# Patient Record
Sex: FEMALE | Race: WHITE | Employment: UNEMPLOYED | URBAN - METROPOLITAN AREA
[De-identification: names, ages, dates, MRNs, and addresses within clinical notes are randomized per-mention and may not be internally consistent; named-entity substitution may affect disease eponyms.]

---

## 2017-11-21 ENCOUNTER — INITIAL CONSULT (OUTPATIENT)
Dept: SURGERY | Age: 27
End: 2017-11-21

## 2017-11-21 VITALS
DIASTOLIC BLOOD PRESSURE: 70 MMHG | WEIGHT: 160 LBS | BODY MASS INDEX: 30.21 KG/M2 | HEIGHT: 61 IN | SYSTOLIC BLOOD PRESSURE: 122 MMHG

## 2017-11-21 DIAGNOSIS — M62.08 DIASTASIS OF RECTUS ABDOMINIS: Primary | ICD-10-CM

## 2017-11-21 PROCEDURE — G8427 DOCREV CUR MEDS BY ELIG CLIN: HCPCS | Performed by: SURGERY

## 2017-11-21 PROCEDURE — 99242 OFF/OP CONSLTJ NEW/EST SF 20: CPT | Performed by: SURGERY

## 2017-11-21 PROCEDURE — G8484 FLU IMMUNIZE NO ADMIN: HCPCS | Performed by: SURGERY

## 2017-11-21 PROCEDURE — G8417 CALC BMI ABV UP PARAM F/U: HCPCS | Performed by: SURGERY

## 2017-11-21 RX ORDER — ESCITALOPRAM OXALATE 20 MG/1
20 TABLET ORAL DAILY
COMMUNITY
End: 2018-07-03 | Stop reason: ALTCHOICE

## 2018-03-15 ENCOUNTER — HOSPITAL ENCOUNTER (OUTPATIENT)
Dept: OTHER | Age: 28
Discharge: HOME | End: 2018-03-15
Attending: FAMILY MEDICINE | Admitting: FAMILY MEDICINE

## 2018-03-15 DIAGNOSIS — M54.50 CHRONIC BILATERAL LOW BACK PAIN WITHOUT SCIATICA: ICD-10-CM

## 2018-03-15 DIAGNOSIS — G89.29 CHRONIC BILATERAL LOW BACK PAIN WITHOUT SCIATICA: ICD-10-CM

## 2018-03-23 ENCOUNTER — HOSPITAL ENCOUNTER (OUTPATIENT)
Dept: PHYSICAL THERAPY | Age: 28
Discharge: HOME | End: 2018-03-31
Admitting: FAMILY MEDICINE

## 2018-03-29 ENCOUNTER — HOSPITAL ENCOUNTER (OUTPATIENT)
Dept: PHYSICAL THERAPY | Age: 28
Discharge: HOME | End: 2018-03-30
Admitting: FAMILY MEDICINE

## 2018-03-29 NOTE — FLOWSHEET NOTE
of L SI joint w/ Forward bend; Baseline R anterior pelvis/ L posterior pelvis                                                     NMR re-education                                             Therapeutic Exercise and NMR EXR  [x] (40848) Provided verbal/tactile cueing for activities related to strengthening, flexibility, endurance, ROM  for improvements in proximal hip and core control with self care, mobility, lifting and ambulation. [x] (89074) Provided verbal/tactile cueing for activities related to improving balance, coordination, kinesthetic sense, posture, motor skill, proprioception  to assist with core control in self care, mobility, lifting, and ambulation.      Therapeutic Activities:    [] (74141 or 73134) Provided verbal/tactile cueing for activities related to improving balance, coordination, kinesthetic sense, posture, motor skill, proprioception and motor activation to allow for proper function  with self care and ADLs  [] (52427) Provided training and instruction to the patient for proper core and proximal hip recruitment and positioning with ambulation re-education     Home Exercise Program:    [x] (82841) Reviewed/Progressed HEP activities related to strengthening, flexibility, endurance, ROM of core, proximal hip and LE for functional self-care, mobility, lifting and ambulation   [] (14429) Reviewed/Progressed HEP activities related to improving balance, coordination, kinesthetic sense, posture, motor skill, proprioception of core, proximal hip and LE for self care, mobility, lifting, and ambulation      Manual Treatments:  PROM / STM / Oscillations-Mobs:  G-I, II, III, IV (PA's, Inf., Post.)  [] (04366) Provided manual therapy to mobilize proximal hip and LS spine soft tissue/joints for the purpose of modulating pain, promoting relaxation,  increasing ROM, reducing/eliminating soft tissue swelling/inflammation/restriction, improving soft tissue extensibility and allowing for proper ROM for normal Other:      ASSESSMENT:  See eval    Treatment/Activity Tolerance:  [x] Patient tolerated treatment well [] Patient limited by fatique  [x] Patient limited by pain  [] Patient limited by other medical complications  [] Other:     Prognosis: [x] Good [] Fair  [] Poor    Patient Requires Follow-up: [x] Yes  [] No    PLAN: See eval  [] Continue per plan of care [] Alter current plan (see comments)  [x] Plan of care initiated [] Hold pending MD visit [] Discharge    Electronically signed by: Carmen Haley PT, DPT

## 2018-04-01 ENCOUNTER — HOSPITAL ENCOUNTER (OUTPATIENT)
Dept: PHYSICAL THERAPY | Age: 28
Discharge: HOME | End: 2018-04-30
Attending: FAMILY MEDICINE | Admitting: FAMILY MEDICINE

## 2018-04-06 ENCOUNTER — HOSPITAL ENCOUNTER (OUTPATIENT)
Dept: PHYSICAL THERAPY | Age: 28
Discharge: HOME | End: 2018-04-07
Admitting: FAMILY MEDICINE

## 2018-04-12 ENCOUNTER — HOSPITAL ENCOUNTER (OUTPATIENT)
Dept: PHYSICAL THERAPY | Age: 28
Discharge: HOME | End: 2018-04-13
Admitting: FAMILY MEDICINE

## 2018-04-24 ENCOUNTER — HOSPITAL ENCOUNTER (OUTPATIENT)
Dept: PHYSICAL THERAPY | Age: 28
Discharge: HOME | End: 2018-04-25
Admitting: FAMILY MEDICINE

## 2018-05-01 ENCOUNTER — HOSPITAL ENCOUNTER (OUTPATIENT)
Dept: PHYSICAL THERAPY | Age: 28
Discharge: HOME | End: 2018-05-31
Attending: FAMILY MEDICINE | Admitting: FAMILY MEDICINE

## 2018-05-01 ENCOUNTER — HOSPITAL ENCOUNTER (OUTPATIENT)
Dept: PHYSICAL THERAPY | Age: 28
Discharge: HOME | End: 2018-05-02
Admitting: FAMILY MEDICINE

## 2018-05-15 ENCOUNTER — HOSPITAL ENCOUNTER (OUTPATIENT)
Dept: PHYSICAL THERAPY | Age: 28
Discharge: HOME | End: 2018-05-16
Admitting: FAMILY MEDICINE

## 2018-06-01 ENCOUNTER — HOSPITAL ENCOUNTER (OUTPATIENT)
Dept: PHYSICAL THERAPY | Age: 28
Discharge: HOME | End: 2018-06-30
Attending: FAMILY MEDICINE | Admitting: FAMILY MEDICINE

## 2018-07-03 ENCOUNTER — OFFICE VISIT (OUTPATIENT)
Dept: ORTHOPEDIC SURGERY | Age: 28
End: 2018-07-03

## 2018-07-03 VITALS
BODY MASS INDEX: 29.07 KG/M2 | SYSTOLIC BLOOD PRESSURE: 122 MMHG | HEIGHT: 61 IN | WEIGHT: 154 LBS | DIASTOLIC BLOOD PRESSURE: 70 MMHG

## 2018-07-03 DIAGNOSIS — M54.16 LUMBAR RADICULOPATHY: ICD-10-CM

## 2018-07-03 DIAGNOSIS — M48.061 LUMBAR FORAMINAL STENOSIS: Primary | ICD-10-CM

## 2018-07-03 DIAGNOSIS — M51.36 DDD (DEGENERATIVE DISC DISEASE), LUMBAR: ICD-10-CM

## 2018-07-03 PROCEDURE — G8417 CALC BMI ABV UP PARAM F/U: HCPCS | Performed by: PHYSICAL MEDICINE & REHABILITATION

## 2018-07-03 PROCEDURE — G8427 DOCREV CUR MEDS BY ELIG CLIN: HCPCS | Performed by: PHYSICAL MEDICINE & REHABILITATION

## 2018-07-03 PROCEDURE — 99243 OFF/OP CNSLTJ NEW/EST LOW 30: CPT | Performed by: PHYSICAL MEDICINE & REHABILITATION

## 2018-07-03 NOTE — PROGRESS NOTES
New Patient: SPINE    Referring Provider:  ROBE Lal CNP    CHIEF COMPLAINT:    Chief Complaint   Patient presents with    Lower Back Pain     NP, LSP. Patient notes intermittent pain for 5 years. No specific injury. Stating having flare ups 3-4 times a year where she is unable to walk. Last flare up was 3 months ago. Has had recent PT from 3/23 - 5/15 with little help. Also seen Chiropractor but little help. No h/o of lumbar spine surgery.  Leg Pain     Radiating left leg/thigh pain. HISTORY OF PRESENT ILLNESS:      · The patient is being sent at the request of ROBE Lal CNP in consultation as a new spine patient for low back pain and right leg pain. The patient is a 33 y.o. female whom reports She presents with intermittent low back pain for the past 5 years. She has had flareups where pain is quite severe and she is unable to ambulate. She competed physical therapy between March 23 2 May 15, 2018. She reports mild benefit from these measures. She continues to have low back pain and left leg pain into the ankle. She finds it difficult to stand rather than 15 minutes or walk greater than 15 minutes.     Pain Assessment  Location of Pain: Back (LSP)  Location Modifiers: Posterior  Severity of Pain: 2  Quality of Pain: Aching  Duration of Pain: Persistent  Frequency of Pain: Constant  Aggravating Factors: Standing, Other (Comment) (Lifting; Prolonged sitting)  Limiting Behavior: Yes  Relieving Factors: Rest  Result of Injury: No  Work-Related Injury: No  Are there other pain locations you wish to document?: No      Associated signs and symptoms:   Neurogenic bowel or bladder symptoms:  no   Perceived weakness:  no   Difficulty walking:  yes    Recent Imaging (within past one year)   Xrays: yes   MRI or CT of spine: no    Current/Past Treatment:   · Physical Therapy:  yes from 3/23-5/15/18  · Chiropractic:  none  · Injection:  none  · Medications:   NSAIDS:  yes   Muscle relaxer: yes   Steriods:  yes   Neuropathic medications:  none   Opioids:  none  · Previous surgery:  no  · Previous surgical consult:  no                Past Medical History:   Past Medical History:   Diagnosis Date    Asthma     as a child    Bacterial vaginosis     Chlamydia     Mental disorder     depression as an adolescent    Sciatic pain       Past Surgical History:     Past Surgical History:   Procedure Laterality Date    WISDOM TOOTH EXTRACTION  October 2010     Current Medications:   No current outpatient prescriptions on file. Allergies:  Nickel  Social History:    reports that she has quit smoking. She smoked 0.50 packs per day. She has never used smokeless tobacco. She reports that she does not drink alcohol or use drugs. Family History:   Family History   Problem Relation Age of Onset    High Blood Pressure Mother     Diabetes Maternal Grandmother     High Blood Pressure Maternal Grandmother     Diabetes Maternal Grandfather     Diabetes Paternal Grandmother     Diabetes Paternal Grandfather     Heart Disease Paternal Grandfather          REVIEW OF SYSTEMS: Full ROS noted & scanned          PHYSICAL EXAM:    Vitals: Blood pressure 122/70, height 5' 1\" (1.549 m), weight 154 lb (69.9 kg), last menstrual period 07/01/2018, unknown if currently breastfeeding. GENERAL EXAM:  · General Apparence: Patient is adequately groomed with no evidence of malnutrition. · Psychiatric: Orientation: The patient is oriented to time, place and person. The patient's mood and affect are appropriate   · Vascular: Examination reveals no swelling and palpation reveals no tenderness in upper or lower extremities. Good capillary refill.    · The lymphatic examination of the neck, axillae and groin reveals all areas to be without enlargement or induration   Sensation is intact without deficit in the upper and lower extremities to light touch and pinprick  · Coordination of the upper and lower extremities are normal.    CERVICAL EXAMINATION:  · Inspection: Local inspection shows no step-off or bruising. Cervical alignment is normal. No instability is noted. · Palpation and Percussion: No evidence of tenderness at the midline. Paraspinal tenderness is not present. There is no paraspinal spasm. · Range of Motion:  limited by 25% in all planes due to pain   · Strength: 5/5 bilateral upper extremities  · Skin:There are no rashes, ulcerations or lesions. · Reflexes: Bilaterally triceps, biceps and brachioradialis are 1+. Clonus absent bilaterally at the feet. No pathological reflexes are noted. · Gait & station: normal, patient ambulates without assistance  · Additional Examinations:  · RIGHT UPPER EXTREMITY:  Inspection/examination of the right upper extremity does not show any tenderness, deformity or injury. Range of motion is normal and pain-free. There is no gross instability. There are no rashes, ulcerations or lesions. Strength and tone are normal. No atrophy or abnormal movements are noted. · LEFT UPPER EXTREMITY: Inspection/examination of the left upper extremity does not show any tenderness, deformity or injury. Range of motion is normal and pain-free. There is no gross instability. There are no rashes, ulcerations or lesions. Strength and tone are normal. No atrophy or abnormal movements are noted. LUMBAR/SACRAL EXAMINATION:  · Inspection: Local inspection shows no step-off or bruising. Lumbar alignment is normal. No instability is noted. · Palpation:   No evidence of tenderness at the midline. No tenderness bilaterally at the paraspinal or trochanters. There is no paraspinal spasm. · Range of Motion: limited by 50% in all planes due to pain  · Strength:   Strength testing is 5/5 in all muscle groups tested. · Special Tests:   Straight leg raise and crossed SLR negative. Wayne's testing is negative bilaterally. FADIR's testing is negative bilaterally.   · Skin: There are no rashes, ulcerations or Bacteria, UA 1+ (A) /HPF    Crystals 1+ Ca. Oxalate (A) /HPF       Impression (Medical Decision Making):       1. Lumbar foraminal stenosis    2. DDD (degenerative disc disease), lumbar    3. Lumbar radiculopathy        Plan (Medical Decision Making):    1. Medications:  Continue current regimen. 2. PT:  Encouraged to continue with HEP. 3. Further studies:  Setup Lumbar MR without contrast to evaluate for soft tissue pathology or stenosis contributing to the back pain and paresthesia. The patient has failed a six week trial of a HEP program within the last 6 months. 4. Interventional:  After further imaging is obtained, interventional options will be reviewed and recommended. 5. Healthy Lifestyle Measures:  Patient education material - Anatomic drawings, healthy lifestyle education, osteoporosis prevention, back and neck pain educational information, and advanced imaging preparedness were distributed to the patient. Posture education, proper lifting and carrying techniques, weight control, quitting smoking and minor ways to treat back pain were reviewed and distributed. For further information regarding the spine conditions and to review interventional treatments the patient was directed to [facility].  6.  Follow up:  1-2 weeks        Kristen Barry MD, BATOOL, [facility]  Board Certified in [address] [place] [address]  Certified and Fellowship Trained in [facility] ([facility])     This dictation was performed with a verbal recognition program [facility]) and it was checked for errors. It is possible that there are still dictated errors within this office note. If so, please bring any errors to my attention for an addendum. All efforts were made to ensure that this office note is accurate.

## 2018-07-11 ENCOUNTER — TELEPHONE (OUTPATIENT)
Dept: ORTHOPEDIC SURGERY | Age: 28
End: 2018-07-11

## 2018-07-11 NOTE — TELEPHONE ENCOUNTER
Spoke to patient to inform her that MRI LSP is approved and authorization is good through 09/01/2018. Order and authorization faxed to LEONARDO Paiz for scheduling purposes. Patient notes having MRI scheduled but now that MRI is approved is going to try to schedule something sooner. Will contact our office for follow up appointment once MRI is rescheduled for follow up with Dr. Mirella Mendieta.

## 2018-08-07 ENCOUNTER — TELEPHONE (OUTPATIENT)
Dept: ORTHOPEDIC SURGERY | Age: 28
End: 2018-08-07

## 2018-08-07 ENCOUNTER — OFFICE VISIT (OUTPATIENT)
Dept: ORTHOPEDIC SURGERY | Age: 28
End: 2018-08-07

## 2018-08-07 VITALS — BODY MASS INDEX: 28.32 KG/M2 | HEIGHT: 61 IN | WEIGHT: 150 LBS

## 2018-08-07 DIAGNOSIS — M51.26 LUMBAR DISC HERNIATION: Primary | ICD-10-CM

## 2018-08-07 DIAGNOSIS — M54.16 LUMBAR RADICULOPATHY: ICD-10-CM

## 2018-08-07 PROCEDURE — G8417 CALC BMI ABV UP PARAM F/U: HCPCS | Performed by: PHYSICAL MEDICINE & REHABILITATION

## 2018-08-07 PROCEDURE — G8427 DOCREV CUR MEDS BY ELIG CLIN: HCPCS | Performed by: PHYSICAL MEDICINE & REHABILITATION

## 2018-08-07 PROCEDURE — 99213 OFFICE O/P EST LOW 20 MIN: CPT | Performed by: PHYSICAL MEDICINE & REHABILITATION

## 2018-08-07 PROCEDURE — 1036F TOBACCO NON-USER: CPT | Performed by: PHYSICAL MEDICINE & REHABILITATION

## 2018-08-07 NOTE — TELEPHONE ENCOUNTER
Patient needs scheduled for an injection. Pre-Procedure sheet was given to the patient. x1 (LEFT L5 & S1 TF)      PER DR. CAT, [facility] PATIENT TO SCHEDULE.

## 2018-08-07 NOTE — LETTER
Please schedule the following with:     Date:   2018 @ 7:15     Account: [de-identified]  Patient: Seamus Moscow    : 1990  Address:  [address] [place] [id number]    Phone (H):  [phone] (home)      ----------------------------------------------------------------------------------------------  Diagnosis:     ICD-10-CM ICD-9-CM    1. Lumbar disc herniation M51.26 722.10    2. Lumbar radiculopathy M54.16 724.4          Levels: L5, S1  on the left  Transforaminal LISA  CPT Codes [id number], [id number]    ----------------------------------------------------------------------------------------------  Injection #   [id number]    Attending Physician       Pee Mejia.  Tamika Munoz MD.  ----------------------------------------------------------------------------------------------  Injection Scheduled For:    At:    [address]    Pre-Cert#    [employer] Insurance     Pre-Cert#    Comments:    · Infection control  · Tested positive for MRSA in past 12 months:  no  · Tested positive for MSSA \"staph infection\" in past 12 months: no  · Tested positive for VRE (Vancomycin Resistant Enterococci) in past 12 months:   no  · Currently on any antibiotics for an infection: no  · Anticoagulants:  · On a blood thinner:  no   · Any history of bleeding disorder: no   · Advanced Liver disease: no   · Advanced Renal disease: no   · Glaucoma: no   · Diabetes: no     Sedation:  No  -----------------------------------------------------------------------------------------------  Allergies   Allergen Reactions    Nickel

## 2018-08-07 NOTE — PROGRESS NOTES
Denies unsteady gait or progressive weakness  MUSCULOSKELETAL: Denies joint swelling or redness  GI: Denies nausea, vomiting, diarrhea   : Denies bowel or bladder issues       PHYSICAL EXAM:    Vitals: Height 5' 1\" (1.549 m), weight 150 lb (68 kg), unknown if currently breastfeeding. GENERAL EXAM:  · General Apparence: Patient is adequately groomed with no evidence of malnutrition. · Psychiatric: Orientation: The patient is oriented to time, place and person. The patient's mood and affect are appropriate   · Vascular: Examination reveals no swelling and palpation reveals no tenderness in upper or lower extremities. Good capillary refill. · Sensation is intact without deficit in the upper and lower extremities to light touch and pinprick  · Coordination of the upper and lower extremities are normal.  ·   LUMBAR/SACRAL EXAMINATION:  · Inspection: Local inspection shows no step-off or bruising. Lumbar alignment is normal. No instability is noted. · Palpation:   No evidence of tenderness at the midline. No tenderness bilaterally at the paraspinal or trochanters. There is no paraspinal spasm. · Range of Motion: limited by 50% in all planes due to pain  · Strength:   Strength testing is 5/5 in all muscle groups tested. · Special Tests:   Straight leg raise and crossed SLR negative. Wayne's testing is negative bilaterally. FADIR's testing is negative bilaterally. · Skin: There are no rashes, ulcerations or lesions. · Reflexes: Reflexes are symmetrically 2+ at the patellar and ankle tendons. Clonus absent bilaterally at the feet. · Gait & station: antalgic on the left  · Additional Examinations:  · RIGHT LOWER EXTREMITY: Inspection/examination of the right lower extremity does not show any tenderness, deformity or injury. Range of motion is normal and pain-free. There is no gross instability. There are no rashes, ulcerations or lesions.  Strength and tone are normal. No atrophy or abnormal movements are Risks, benefits and alternatives of interventional options were discussed. These include and are not limited to bleeding, infection, spinal headache, nerve injury and lack of pain relief. The patient verbalized understanding and would like to proceed. The patient will be scheduled accordingly. 5. Follow up:  4-6 weeks          Kristen Plata MD, BATOOL, [facility]  Board Certified in [address] [place] [address]  Certified and Fellowship Trained in [facility] ([facility])             This dictation was performed with a verbal recognition program [facility]) and it was checked for errors. It is possible that there are still dictated errors within this office note. If so, please bring any errors to my attention for an addendum. All efforts were made to ensure that this office note is accurate.

## 2018-08-17 ENCOUNTER — TELEPHONE (OUTPATIENT)
Dept: ORTHOPEDIC SURGERY | Age: 28
End: 2018-08-17

## 2018-08-28 ENCOUNTER — HOSPITAL ENCOUNTER (OUTPATIENT)
Age: 28
Setting detail: OUTPATIENT SURGERY
Discharge: HOME | End: 2018-08-28
Attending: PHYSICAL MEDICINE & REHABILITATION | Admitting: PHYSICAL MEDICINE & REHABILITATION
Payer: COMMERCIAL

## 2018-08-28 VITALS
HEART RATE: 51 BPM | DIASTOLIC BLOOD PRESSURE: 72 MMHG | SYSTOLIC BLOOD PRESSURE: 113 MMHG | TEMPERATURE: 97.4 F | RESPIRATION RATE: 16 BRPM | OXYGEN SATURATION: 100 %

## 2018-08-28 PROCEDURE — 3600000012 HC SURGERY LEVEL 2 ADDTL 15MIN: Performed by: PHYSICAL MEDICINE & REHABILITATION

## 2018-08-28 PROCEDURE — 2500000003 HC RX 250 WO HCPCS: Performed by: PHYSICAL MEDICINE & REHABILITATION

## 2018-08-28 PROCEDURE — 2709999900 HC NON-CHARGEABLE SUPPLY: Performed by: PHYSICAL MEDICINE & REHABILITATION

## 2018-08-28 PROCEDURE — 6360000002 HC RX W HCPCS: Performed by: PHYSICAL MEDICINE & REHABILITATION

## 2018-08-28 PROCEDURE — 6360000004 HC RX CONTRAST MEDICATION: Performed by: PHYSICAL MEDICINE & REHABILITATION

## 2018-08-28 PROCEDURE — 7100000010 HC PHASE II RECOVERY - FIRST 15 MIN: Performed by: PHYSICAL MEDICINE & REHABILITATION

## 2018-08-28 PROCEDURE — 3600000002 HC SURGERY LEVEL 2 BASE: Performed by: PHYSICAL MEDICINE & REHABILITATION

## 2018-08-28 RX ORDER — LIDOCAINE HYDROCHLORIDE 10 MG/ML
INJECTION, SOLUTION EPIDURAL; INFILTRATION; INTRACAUDAL; PERINEURAL PRN
Status: DISCONTINUED | OUTPATIENT
Start: 2018-08-28 | End: 2018-08-28 | Stop reason: HOSPADM

## 2018-08-28 ASSESSMENT — PAIN DESCRIPTION - DESCRIPTORS: DESCRIPTORS: ACHING;SHARP

## 2018-08-28 ASSESSMENT — PAIN SCALES - GENERAL: PAINLEVEL_OUTOF10: 0

## 2018-08-28 ASSESSMENT — PAIN - FUNCTIONAL ASSESSMENT: PAIN_FUNCTIONAL_ASSESSMENT: 0-10

## 2018-08-28 NOTE — OP NOTE
Patient:  Sylvia Holm  YOB: 1990  Medical Record #:  [id number]   Place: [address], [place]  Date:  8/28/2018   Physician:  Tristen Benítez MD    Procedure: 1. Transforaminal Lumbar Epidural Steroid Injection -  left L5           2. Transforaminal Lumbar Epidural Steroid Injection -  left S1     Pre-Procedure Diagnosis: Lumbar HNP, Lumbar radiculopathy      Post-Procedure Diagnosis: Same    Sedation: Local with 1% Lidocaine 2.5 ml and no IV sedation    EBL: None    Complications: None    Procedure Summary:    The patient was seen in the office for complaints of low back and left leg pain. Review of the imaging and physical exam of the patient confirmed the pre-procedure diagnosis. After a thorough discussion of risks, benefits and alternatives informed consent was obtained. The patient was brought to the procedure suite and placed in the prone position. The skin overlying the lumbar spine was prepped and draped in the usual sterile fashion. Using fluoroscopic guidance, the left L5 foramen was identified. Through anesthetized skin, a 22 gauge 3.5 inch curved tip spinal needle was advanced into the foramen. Isovue M 300 was instilled showing an epidurogram/nerve root outline pattern without evidence of vascular or intrathecal spread. Following which, 40 mg of depomedrol mixed with 1 ml of 0.5% Marcaine was instilled. The needle was removed. Using fluoroscopic guidance, the left S1 foramen was identified. Through anesthetized skin, a 22 gauge 3.5 inch curved tip spinal needle was advanced into the foramen. Isovue M 300 was instilled showing an epidurogram/nerve root outline pattern without evidence of vascular or intrathecal spread. Following which, 40 mg of depomedrol mixed with 1 ml of 0.5% Marcaine was instilled. The needle was removed and band-aids were applied. The patient was transferred to the post-operative area in stable condition.

## 2018-09-11 ENCOUNTER — OFFICE VISIT (OUTPATIENT)
Dept: ORTHOPEDIC SURGERY | Age: 28
End: 2018-09-11

## 2018-09-11 VITALS — BODY MASS INDEX: 28.32 KG/M2 | HEIGHT: 61 IN | WEIGHT: 150 LBS

## 2018-09-11 DIAGNOSIS — M47.816 SPONDYLOSIS OF LUMBAR REGION WITHOUT MYELOPATHY OR RADICULOPATHY: ICD-10-CM

## 2018-09-11 DIAGNOSIS — M51.26 HNP (HERNIATED NUCLEUS PULPOSUS), LUMBAR: Primary | ICD-10-CM

## 2018-09-11 PROCEDURE — G8427 DOCREV CUR MEDS BY ELIG CLIN: HCPCS | Performed by: PHYSICAL MEDICINE & REHABILITATION

## 2018-09-11 PROCEDURE — 99213 OFFICE O/P EST LOW 20 MIN: CPT | Performed by: PHYSICAL MEDICINE & REHABILITATION

## 2018-09-11 PROCEDURE — G8417 CALC BMI ABV UP PARAM F/U: HCPCS | Performed by: PHYSICAL MEDICINE & REHABILITATION

## 2018-09-11 PROCEDURE — 1036F TOBACCO NON-USER: CPT | Performed by: PHYSICAL MEDICINE & REHABILITATION

## 2018-09-11 NOTE — PROGRESS NOTES
Follow up: Ran WAGONER Debra  1990  [id number]      CHIEF COMPLAINT:    Chief Complaint   Patient presents with    Lower Back Pain     F/u Left L5 & Left S1 TF 8/28. Patient notes no improvement from injection. HISTORY OF PRESENT ILLNESS:  Ms. Cisco Lester is a 33 y.o. female returns for a follow up visit for multiple medical problems. Her current presenting problems are   1. HNP (herniated nucleus pulposus), lumbar    2. Spondylosis of lumbar region without myelopathy or radiculopathy    . As per information/history obtained from the PADT(patient assessment and documentation tool) - She complains of pain in the lower back with radiation to the lower back She rates the pain 4/10 and describes it as dull, aching. Pain is made worse by: random movements/activities, prolonged sitting or household chores. She denies side effects from the current pain regimen. Patient reports that since the last follow up visit the physical functioning is unchanged, family/social relationships are unchanged, mood is unchanged and sleep patterns are unchanged, and that the overall functioning is unchanged. Patient denies neurological bowel or bladder. She returns after undergoing a left L5 and S1 transforaminal epidural steroid injection. Up until this weekend her pain was a 0-1 out of 10. Over the weekend she did quite a bit of activity including cleaning her house all day long. She reports this is aggravated her back pain. She denies having any leg pain.         Associated signs and symptoms:   Neurogenic bowel or bladder symptoms:  no   Perceived weakness:  no   Difficulty walking:  no              Past Medical History:   Past Medical History:   Diagnosis Date    Asthma     as a child    Bacterial vaginosis     Chlamydia     Mental disorder     depression as an adolescent    Sciatic pain       Past Surgical History:     Past Surgical History:   Procedure Laterality Date    LA NJX DX/THER SBST INTRLMNR CRV/THRC W/IMG GDN Left 8/28/2018    LEFT LUMBAR FIVE SACRAL ONE TRANSFORAMINAL EPIDURAL STEROID INJECTION SITE CONFIRMED BY FLUOROSOCOPY performed by Arun Dunn MD at [address] EXTRACTION  October 2010     Current Medications:   No current outpatient prescriptions on file. Allergies:  Nickel  Social History:    reports that she has quit smoking. She smoked 0.50 packs per day. She has never used smokeless tobacco. She reports that she does not drink alcohol or use drugs. Family History:   Family History   Problem Relation Age of Onset    High Blood Pressure Mother     Diabetes Maternal Grandmother     High Blood Pressure Maternal Grandmother     Diabetes Maternal Grandfather     Diabetes Paternal Grandmother     Diabetes Paternal Grandfather     Heart Disease Paternal Grandfather        REVIEW OF SYSTEMS:   CONSTITUTIONAL: Denies unexplained weight loss, fevers, chills or fatigue  NEUROLOGICAL: Denies unsteady gait or progressive weakness  MUSCULOSKELETAL: Denies joint swelling or redness  GI: Denies nausea, vomiting, diarrhea   : Denies bowel or bladder issues       PHYSICAL EXAM:    Vitals: Height 5' 1\" (1.549 m), weight 150 lb (68 kg), last menstrual period 08/28/2018, unknown if currently breastfeeding. GENERAL EXAM:  · General Apparence: Patient is adequately groomed with no evidence of malnutrition. · Psychiatric: Orientation: The patient is oriented to time, place and person. The patient's mood and affect are appropriate   · Vascular: Examination reveals no swelling and palpation reveals no tenderness in upper or lower extremities. Good capillary refill. · Sensation is intact without deficit in the upper and lower extremities to light touch and pinprick  · Coordination of the upper and lower extremities are normal.      LUMBAR/SACRAL EXAMINATION:  · Inspection: Local inspection shows no step-off or bruising.   Lumbar alignment is normal. No instability is noted.  · Palpation:   No evidence of tenderness at the midline. No tenderness bilaterally at the paraspinal or trochanters. There is no paraspinal spasm. · Range of Motion: limited by 25% in all planes due to pain  · Strength:   Strength testing is 5/5 in all muscle groups tested. · Special Tests:   Straight leg raise and crossed SLR negative. · Skin: There are no rashes, ulcerations or lesions. · Reflexes: Reflexes are symmetrically 2+ at the patellar and ankle tendons. Clonus absent bilaterally at the feet. · Gait & station: normal, patient ambulates without assistance  · Additional Examinations:  · RIGHT LOWER EXTREMITY: Inspection/examination of the right lower extremity does not show any tenderness, deformity or injury. Range of motion is normal and pain-free. There is no gross instability. There are no rashes, ulcerations or lesions. Strength and tone are normal. No atrophy or abnormal movements are noted. · LEFT LOWER EXTREMITY:  Inspection/examination of the left lower extremity does not show any tenderness, deformity or injury. Range of motion is normal and pain-free. There is no gross instability. There are no rashes, ulcerations or lesions. Strength and tone are normal. No atrophy or abnormal movements are noted. Diagnostic Testing:    MR Lumbar spine shows 7/25/18  1. Left paracentral L5-S1 disc herniation indenting the anterior thecal sac. 2. Central L4-5 disc herniation indenting the anterior thecal sac.        Results for orders placed or performed during the hospital encounter of 08/21/16   Urine reflex to culture   Result Value Ref Range    Color, UA Yellow Straw/Yellow    Clarity, UA Clear Clear    Glucose, Ur Negative Negative mg/dL    Bilirubin Urine Negative Negative    Ketones, Urine TRACE (A) Negative mg/dL    Specific Gravity, UA >=1.030 1.005 - 1.030    Blood, Urine Negative Negative    pH, UA 5.5 5.0 - 8.0    Protein, UA TRACE (A) Negative mg/dL    Urobilinogen, Urine 0.2 <2.0 E.U./dL    Nitrite, Urine Negative Negative    Leukocyte Esterase, Urine Negative Negative    Microscopic Examination YES     Urine Reflex to Culture Not Indicated     Urine Type Not Specified    Microscopic Urinalysis   Result Value Ref Range    WBC, UA 0-2 0 - 5 /HPF    RBC, UA 0-2 0 - 2 /HPF    Epi Cells 3-5 /HPF    Bacteria, UA 1+ (A) /HPF    Crystals 1+ Ca. Oxalate (A) /HPF     Impression:       1. HNP (herniated nucleus pulposus), lumbar    2. Spondylosis of lumbar region without myelopathy or radiculopathy        Plan:  Clinical Course: No change    1. Medications:  Continue anti-inflammatories with appropriate GI Precautions including to stop if develop dark tarry stools or GI upset and to take with food. 2. PT:  She was given a lumbar HEP  3. Further studies:  No further studies. 4. Interventional:  No significant benefit from LESI  5. Follow up:  4-6 months          Kristen Lange MD, BATOOL, [facility]  Board Certified in [address] [place] [address] Certified and Fellowship Trained in [facility] ([facility])             This dictation was performed with a verbal recognition program [facility]) and it was checked for errors. It is possible that there are still dictated errors within this office note. If so, please bring any errors to my attention for an addendum. All efforts were made to ensure that this office note is accurate.

## 2019-05-07 ENCOUNTER — OFFICE VISIT (OUTPATIENT)
Dept: ORTHOPEDIC SURGERY | Age: 29
End: 2019-05-07
Payer: COMMERCIAL

## 2019-05-07 VITALS
BODY MASS INDEX: 28.3 KG/M2 | DIASTOLIC BLOOD PRESSURE: 62 MMHG | HEART RATE: 69 BPM | WEIGHT: 149.91 LBS | SYSTOLIC BLOOD PRESSURE: 108 MMHG | HEIGHT: 61 IN

## 2019-05-07 DIAGNOSIS — M54.16 LUMBAR RADICULOPATHY: ICD-10-CM

## 2019-05-07 DIAGNOSIS — M51.26 HNP (HERNIATED NUCLEUS PULPOSUS), LUMBAR: Primary | ICD-10-CM

## 2019-05-07 DIAGNOSIS — M47.816 SPONDYLOSIS WITHOUT MYELOPATHY OR RADICULOPATHY, LUMBAR REGION: ICD-10-CM

## 2019-05-07 PROCEDURE — G8417 CALC BMI ABV UP PARAM F/U: HCPCS | Performed by: PHYSICAL MEDICINE & REHABILITATION

## 2019-05-07 PROCEDURE — G8427 DOCREV CUR MEDS BY ELIG CLIN: HCPCS | Performed by: PHYSICAL MEDICINE & REHABILITATION

## 2019-05-07 PROCEDURE — 1036F TOBACCO NON-USER: CPT | Performed by: PHYSICAL MEDICINE & REHABILITATION

## 2019-05-07 PROCEDURE — 99214 OFFICE O/P EST MOD 30 MIN: CPT | Performed by: PHYSICAL MEDICINE & REHABILITATION

## 2019-05-07 RX ORDER — METHYLPREDNISOLONE 4 MG/1
TABLET ORAL
Qty: 1 KIT | Refills: 0 | Status: SHIPPED | OUTPATIENT
Start: 2019-05-07 | End: 2019-05-30 | Stop reason: ALTCHOICE

## 2019-05-07 NOTE — LETTER
Please schedule the following with:     Date:   2019 @ 9;15     Account: [de-identified]  Patient: Damian Vanegas    : 1990  Address:  [address] [place] [address] [facility]    Phone (H):  [phone] (home)      ----------------------------------------------------------------------------------------------  Diagnosis:     ICD-10-CM    1. HNP (herniated nucleus pulposus), lumbar M51.26    2. Lumbar radiculopathy M54.16    3. Spondylosis without myelopathy or radiculopathy, lumbar region M47.816          Levels:L4  Procedure type TRANSFORAMINAL  Side BILATERAL  CPT Codes 99280    ----------------------------------------------------------------------------------------------  Injection #   [email or web address]    Attending Physician       Mercy Quiroz MD.      ----------------------------------------------------------------------------------------------  Injection Scheduled For:    At:    [address]    Pre-Cert#    [employer]     Pre-Cert#    Comments or Special instructions:    · Infection control  · Tested positive for MRSA in past 12 months:  no  · Tested positive for MSSA \"staph infection\" in past 12 months: no  · Tested positive for VRE (Vancomycin Resistant Enterococci) in past 12 months:   no  · Currently on any antibiotics for an infection: no  · Anticoagulants:  · On a blood thinner:  no   · Any history of bleeding disorder: no   · Advanced Liver disease: no   · Advanced Renal disease: no   · Glaucoma: no   · Diabetes: no     Sedation:  No  -----------------------------------------------------------------------------------------------  Allergies   Allergen Reactions    Nickel

## 2019-05-07 NOTE — PROGRESS NOTES
Follow up: Wanda Richardson  1990  [id number]      CHIEF COMPLAINT:    Chief Complaint   Patient presents with    Lower Back Pain         HISTORY OF PRESENT ILLNESS:  Ms. Agustin Torres is a 26 y.o. female returns for a follow up visit for multiple medical problems. Her current presenting problems are   1. HNP (herniated nucleus pulposus), lumbar    2. Lumbar radiculopathy    3. Spondylosis without myelopathy or radiculopathy, lumbar region    . As per information/history obtained from the PADT(patient assessment and documentation tool) - She complains of pain in the lower back with radiation to the upper leg Bilateral She rates the pain 4/10 and describes it as aching, throbbing. Pain is made worse by: sitting. She denies side effects from the current pain regimen. Patient reports that since the last follow up visit the physical functioning is better, family/social relationships are unchanged, mood is unchanged and sleep patterns are worse, and that the overall functioning is unchanged. Patient denies neurological bowel or bladder. Patient presents in office today for lower back pain. Patient states that in today's visit her pain level is a 4/10. She states that last week she was up to a 9/10. She states that when her pain is at its worse she can feel it radiating into her legs bilaterally. She states that she has been to her chiropractor, stretches, massages, and taken Ibuprofen without relief. Patient believes that the pain is getting worse. Patient is unsure if the intense pain is related to how she sleeps. Patient had a left L5-S1 LISA on 8/28/19. She did not have lasting relief from this injection.        Associated signs and symptoms:   Neurogenic bowel or bladder symptoms:  no   Perceived weakness:  no   Difficulty walking:  no              Past Medical History:   Past Medical History:   Diagnosis Date    Asthma     as a child    Bacterial vaginosis     Chlamydia     Mental disorder all areas to be without enlargement or induration  · Sensation is intact without deficit in the upper and lower extremities to light touch and pinprick  · Coordination of the upper and lower extremities are normal.  · RIGHT UPPER EXTREMITY:  Inspection/examination of the right upper extremity does not show any tenderness, deformity or injury. Range of motion is unremarkable and pain-free. There is no gross instability. There are no rashes, ulcerations or lesions. Strength and tone are normal. No atrophy or abnormal movements are noted. · LEFT UPPER EXTREMITY: Inspection/examination of the left upper extremity does not show any tenderness, deformity or injury. Range of motion is unremarkable and pain-free. There is no gross instability. There are no rashes, ulcerations or lesions. Strength and tone are normal. No atrophy or abnormal movements are noted. LUMBAR/SACRAL EXAMINATION:  · Inspection: Local inspection shows no step-off or bruising. Lumbar alignment is normal. No instability is noted. · Palpation:   No evidence of tenderness at the midline. Lumbar paraspinal tenderness: Mild L4/5 and L5/S1 tenderness  Bursal tenderness No tenderness bilaterally  There is no paraspinal spasm. · Range of Motion: limited by 50% in all planes due to pain with side bending and extension. · Strength:   Strength testing is 5/5 in all muscle groups tested. · Special Tests:   Straight leg raise positive bilaterally and crossed SLR negative. Wayne's testing is negative bilaterally. FADIR's testing is negative bilaterally. · Skin: There are no rashes, ulcerations or lesions. · Reflexes: Reflexes are symmetrically 2+ at the patellar and ankle tendons. Clonus absent bilaterally at the feet. · Gait & station: normal, patient ambulates without assistance  · Additional Examinations:  · RIGHT LOWER EXTREMITY: Inspection/examination of the right lower extremity does not show any tenderness, deformity or injury.  Range of motion is normal and pain-free. There is no gross instability. There are no rashes, ulcerations or lesions. Strength and tone are normal. No atrophy or abnormal movements are noted. · LEFT LOWER EXTREMITY:  Inspection/examination of the left lower extremity does not show any tenderness, deformity or injury. Range of motion is normal and pain-free. There is no gross instability. There are no rashes, ulcerations or lesions. Strength and tone are normal. No atrophy or abnormal movements are noted. Diagnostic Testing:    No new diagnostics NONE  Results for orders placed or performed during the hospital encounter of 08/21/16   Urine reflex to culture   Result Value Ref Range    Color, UA Yellow Straw/Yellow    Clarity, UA Clear Clear    Glucose, Ur Negative Negative mg/dL    Bilirubin Urine Negative Negative    Ketones, Urine TRACE (A) Negative mg/dL    Specific Gravity, UA >=1.030 1.005 - 1.030    Blood, Urine Negative Negative    pH, UA 5.5 5.0 - 8.0    Protein, UA TRACE (A) Negative mg/dL    Urobilinogen, Urine 0.2 <2.0 E.U./dL    Nitrite, Urine Negative Negative    Leukocyte Esterase, Urine Negative Negative    Microscopic Examination YES     Urine Reflex to Culture Not Indicated     Urine Type Not Specified    Microscopic Urinalysis   Result Value Ref Range    WBC, UA 0-2 0 - 5 /HPF    RBC, UA 0-2 0 - 2 /HPF    Epi Cells 3-5 /HPF    Bacteria, UA 1+ (A) /HPF    Crystals 1+ Ca. Oxalate (A) /HPF     Impression:       1. HNP (herniated nucleus pulposus), lumbar    2. Lumbar radiculopathy    3. Spondylosis without myelopathy or radiculopathy, lumbar region        Plan:  Clinical Course: Above diagnoses are worsening    I discussed the diagnosis and the treatment options with Maria Victoria Henning today. In Summary:  The various treatment options were outlined and discussed with Maria Victoria Henning including:  Conservative care options: physical therapy, ice, medications, bracing, and activity modification.  The indications for therapeutic injections. The indications for additional imaging/laboratory studies. The indications for (possible future) interventions. After considering the various options discussed, Hal Gutierrez elected to pursue a course of treatment that includes the followin. Medications:  I will prescribe a medrol dose pack to help with the inflammatory component of pain. Risks, benefits and alternatives were discussed. Recommended to stop any NSAIDs to reduce risk of GI bleed during the course of the dose pack. 2. PT:  Encouraged to continue with HEP. 3. Further studies:  No further studies. 4. Interventional:  We discussed pursuing a Bilateral L4 TF epidural steroid injection to address the pain. Radiologic imaging and symptoms confirm the pain etiology. Risks, benefits and alternatives of interventional options were discussed. These include and are not limited to bleeding, infection, spinal headache, nerve injury and lack of pain relief. The patient verbalized understanding and would like to proceed. The patient will be scheduled accordingly. 5. Follow up:  4-6 weeks      Hal Gutierrez was instructed to call the office if her symptoms worsen or if new symptoms appear prior to the next scheduled visit. She is specifically instructed to contact the office between now & her scheduled appointment if she has concerns related to her condition or if she needs assistance in scheduling the above tests. She is welcome to call for an appointment sooner if she has any additional concerns or questions. Tony Colmenares CRMA, am scribing for and in the presence of Dr. Asher Echavarria. Wilberto. 19 5:15 PM Keshav Molina CRMA. I, Dr. Asher Echavarria. Wilberto, personally performed the services described in this documentation as scribed by Keshav Molina CRMA in my presence and it is both accurate and complete. Riya Ramsey.  Shruthi Boone MD, BATOOL, [facility]  Board Certified in Physical Medicine & Rehabilitation  Board Certified and Fellowship Trained in [facility] ([facility])             This dictation was performed with a verbal recognition program [facility]) and it was checked for errors. It is possible that there are still dictated errors within this office note. If so, please bring any errors to my attention for an addendum. All efforts were made to ensure that this office note is accurate.

## 2019-05-13 ENCOUNTER — TELEPHONE (OUTPATIENT)
Dept: ORTHOPEDIC SURGERY | Age: 29
End: 2019-05-13

## 2019-05-13 NOTE — TELEPHONE ENCOUNTER
Patient has been in inceased pain with numbness and tingling and pain down the bilateral legs. Spoke with Dr. Anastacia Marks and she would like the patient to come to the office tomorrow. I told the patient that she could have 1:30 pm.  There seemed to be a slot open at that time. If you could please put her on the schedule at that time, that would be great. She could not do 3:45 pm and she could not do the morning slots that I have available.

## 2019-05-13 NOTE — TELEPHONE ENCOUNTER
Please see below for patient pain level and concerns. She was last seen 5/7/2019. Given medrol dose pack and scheduled for inj. She has [facility]. I do not have approval yet as it can take up to 15 business day. She is stating her pain is 10 times worse than before her appt and rx. Tried to get patient to schedule an office appt to evaluate. She refuses and wants to be called. Thank yoy.

## 2019-05-13 NOTE — TELEPHONE ENCOUNTER
PATIENT CALLED, STATES PAIN AFTER STEROID ID 10X WORSE, CAN STAND UP STRAIGHT, AND WOULD LIKE A CALL BACK, AND SHE CAN NOT TO DAILY TASKS IN THIS CURRENT STATES, PATIENT CAN BE REACHED -[phone]

## 2019-05-14 ENCOUNTER — OFFICE VISIT (OUTPATIENT)
Dept: ORTHOPEDIC SURGERY | Age: 29
End: 2019-05-14
Payer: COMMERCIAL

## 2019-05-14 VITALS
HEART RATE: 67 BPM | WEIGHT: 149.91 LBS | DIASTOLIC BLOOD PRESSURE: 62 MMHG | BODY MASS INDEX: 28.3 KG/M2 | HEIGHT: 61 IN | SYSTOLIC BLOOD PRESSURE: 104 MMHG | RESPIRATION RATE: 14 BRPM

## 2019-05-14 DIAGNOSIS — M70.61 GREATER TROCHANTERIC BURSITIS OF BOTH HIPS: ICD-10-CM

## 2019-05-14 DIAGNOSIS — M70.62 GREATER TROCHANTERIC BURSITIS OF BOTH HIPS: ICD-10-CM

## 2019-05-14 DIAGNOSIS — M47.816 SPONDYLOSIS WITHOUT MYELOPATHY OR RADICULOPATHY, LUMBAR REGION: ICD-10-CM

## 2019-05-14 DIAGNOSIS — M54.16 LUMBAR RADICULOPATHY: ICD-10-CM

## 2019-05-14 DIAGNOSIS — M51.26 HNP (HERNIATED NUCLEUS PULPOSUS), LUMBAR: ICD-10-CM

## 2019-05-14 PROCEDURE — G8417 CALC BMI ABV UP PARAM F/U: HCPCS | Performed by: PHYSICAL MEDICINE & REHABILITATION

## 2019-05-14 PROCEDURE — 99214 OFFICE O/P EST MOD 30 MIN: CPT | Performed by: PHYSICAL MEDICINE & REHABILITATION

## 2019-05-14 PROCEDURE — 1036F TOBACCO NON-USER: CPT | Performed by: PHYSICAL MEDICINE & REHABILITATION

## 2019-05-14 PROCEDURE — G8427 DOCREV CUR MEDS BY ELIG CLIN: HCPCS | Performed by: PHYSICAL MEDICINE & REHABILITATION

## 2019-05-14 PROCEDURE — 20611 DRAIN/INJ JOINT/BURSA W/US: CPT | Performed by: PHYSICAL MEDICINE & REHABILITATION

## 2019-05-14 RX ORDER — METHYLPREDNISOLONE 4 MG/1
TABLET ORAL
Qty: 1 KIT | Refills: 0 | Status: CANCELLED | OUTPATIENT
Start: 2019-05-14

## 2019-05-14 NOTE — PROGRESS NOTES
pain       Past Surgical History:     Past Surgical History:   Procedure Laterality Date    ME NJX DX/THER SBST INTRLMNR CRV/THRC W/IMG GDN Left 8/28/2018    LEFT LUMBAR FIVE SACRAL ONE TRANSFORAMINAL EPIDURAL STEROID INJECTION SITE CONFIRMED BY FLUOROSOCOPY performed by Marni Nobles MD at [address] EXTRACTION  October 2010     Current Medications:     Current Outpatient Medications:     methylPREDNISolone (MEDROL, ROSE MARY,) 4 MG tablet, Take by mouth., Disp: 1 kit, Rfl: 0  Allergies:  Nickel  Social History:    reports that she has quit smoking. She smoked 0.50 packs per day. She has never used smokeless tobacco. She reports that she does not drink alcohol or use drugs. Family History:   Family History   Problem Relation Age of Onset    High Blood Pressure Mother     Diabetes Maternal Grandmother     High Blood Pressure Maternal Grandmother     Diabetes Maternal Grandfather     Diabetes Paternal Grandmother     Diabetes Paternal Grandfather     Heart Disease Paternal Grandfather        REVIEW OF SYSTEMS:   CONSTITUTIONAL: Denies unexplained weight loss, fevers, chills or fatigue  NEUROLOGICAL: Denies unsteady gait or progressive weakness  MUSCULOSKELETAL: Denies joint swelling or redness  GI: Denies nausea, vomiting, diarrhea   : Denies bowel or bladder issues       PHYSICAL EXAM:    Vitals: Blood pressure 104/62, pulse 67, resp. rate 14, height 5' 0.98\" (1.549 m), weight 149 lb 14.6 oz (68 kg), unknown if currently breastfeeding. GENERAL EXAM:  · General Apparence: Patient is adequately groomed with no evidence of malnutrition. · Psychiatric: Orientation: The patient is oriented to time, place and person. The patient's mood and affect are appropriate   · Vascular: Examination reveals no swelling and palpation reveals no tenderness in upper or lower extremities. Good capillary refill.    · The lymphatic examination of the neck, axillae and groin reveals all areas to be without enlargement or induration  · Sensation is intact without deficit in the upper and lower extremities to light touch and pinprick  · Coordination of the upper and lower extremities are normal.  · RIGHT UPPER EXTREMITY:  Inspection/examination of the right upper extremity does not show any tenderness, deformity or injury. Range of motion is unremarkable and pain-free. There is no gross instability. There are no rashes, ulcerations or lesions. Strength and tone are normal. No atrophy or abnormal movements are noted. · LEFT UPPER EXTREMITY: Inspection/examination of the left upper extremity does not show any tenderness, deformity or injury. Range of motion is unremarkable and pain-free. There is no gross instability. There are no rashes, ulcerations or lesions. Strength and tone are normal. No atrophy or abnormal movements are noted. LUMBAR/SACRAL EXAMINATION:  · Inspection: Local inspection shows no step-off or bruising. Lumbar alignment is normal. No instability is noted. · Palpation:   No evidence of tenderness at the midline. Lumbar paraspinal tenderness: Very limited  Bursal tenderness Bilateral greater trochanteric tenderness  There is no paraspinal spasm. · Range of Motion: very limited due to pain  · Strength:   Strength testing is 5/5 in all muscle groups tested. · Special Tests:   Straight leg raise and crossed SLR negative. Wayne's testing is negative bilaterally. FADIR's testing is negative bilaterally. · Skin: There are no rashes, ulcerations or lesions. · Reflexes: Reflexes are symmetrically 2+ at the patellar and ankle tendons. Clonus absent bilaterally at the feet. · Gait & station: normal, patient ambulates without assistance  · Additional Examinations:  · RIGHT LOWER EXTREMITY: Inspection/examination of the right lower extremity does not show any tenderness, deformity or injury. Range of motion is normal and pain-free. There is no gross instability.   There are no rashes, ulcerations or lesions. Strength and tone are normal. No atrophy or abnormal movements are noted. · LEFT LOWER EXTREMITY:  Inspection/examination of the left lower extremity does not show any tenderness, deformity or injury. Range of motion is normal and pain-free. There is no gross instability. There are no rashes, ulcerations or lesions. Strength and tone are normal. No atrophy or abnormal movements are noted. Diagnostic Testing:    NONE  Results for orders placed or performed during the hospital encounter of 08/21/16   Urine reflex to culture   Result Value Ref Range    Color, UA Yellow Straw/Yellow    Clarity, UA Clear Clear    Glucose, Ur Negative Negative mg/dL    Bilirubin Urine Negative Negative    Ketones, Urine TRACE (A) Negative mg/dL    Specific Gravity, UA >=1.030 1.005 - 1.030    Blood, Urine Negative Negative    pH, UA 5.5 5.0 - 8.0    Protein, UA TRACE (A) Negative mg/dL    Urobilinogen, Urine 0.2 <2.0 E.U./dL    Nitrite, Urine Negative Negative    Leukocyte Esterase, Urine Negative Negative    Microscopic Examination YES     Urine Reflex to Culture Not Indicated     Urine Type Not Specified    Microscopic Urinalysis   Result Value Ref Range    WBC, UA 0-2 0 - 5 /HPF    RBC, UA 0-2 0 - 2 /HPF    Epi Cells 3-5 /HPF    Bacteria, UA 1+ (A) /HPF    Crystals 1+ Ca. Oxalate (A) /HPF     Impression:       1. HNP (herniated nucleus pulposus), lumbar    2. Lumbar radiculopathy    3. Spondylosis without myelopathy or radiculopathy, lumbar region    4. Greater trochanteric bursitis of both hips        Plan:  Clinical Course: Above diagnoses are worsening    I discussed the diagnosis and the treatment options with Steve Omalley today. In Summary:  The various treatment options were outlined and discussed with Steve Omalley including:  Conservative care options: physical therapy, ice, medications, bracing, and activity modification. The indications for therapeutic injections.  The indications for additional

## 2019-05-14 NOTE — PROGRESS NOTES
5/14/19 2:25 PM         ND: 2862-2710-59   Yossi Mendoza    Lot Number: [id number]       Comments: BILATERAL GREATER TROCHANTERIC BURSA        NDC: 6736-1093-19   -   LIDOCAINE 1%    Lot Number: [id number]       Comments:  BILATERAL GREATER TROCHANTERIC BURSA

## 2019-05-15 ENCOUNTER — TELEPHONE (OUTPATIENT)
Dept: ORTHOPEDIC SURGERY | Age: 29
End: 2019-05-15

## 2019-05-15 RX ORDER — PREDNISONE 10 MG/1
TABLET ORAL
Qty: 26 TABLET | Refills: 0 | Status: SHIPPED | OUTPATIENT
Start: 2019-05-15 | End: 2019-05-30 | Stop reason: ALTCHOICE

## 2019-05-15 RX ORDER — GABAPENTIN 300 MG/1
300 CAPSULE ORAL NIGHTLY
Qty: 30 CAPSULE | Refills: 0 | Status: SHIPPED | OUTPATIENT
Start: 2019-05-15 | End: 2019-09-10 | Stop reason: ALTCHOICE

## 2019-05-15 NOTE — TELEPHONE ENCOUNTER
Dr. Eugenio Wilson took care of this and signed the note and the medications. They should be at the pharmacy in the next hour or so. Thank you!

## 2019-05-15 NOTE — TELEPHONE ENCOUNTER
Patient called this date stating she saw Dr. Kenrick Hussein yesterday and was supposed to have 2 prescriptions called into [facility] in [facility]. They have not been sent yet and patient would like to have them today. Please call her back at [phone].

## 2019-06-03 ENCOUNTER — TELEPHONE (OUTPATIENT)
Dept: ORTHOPEDIC SURGERY | Age: 29
End: 2019-06-03

## 2019-06-03 NOTE — TELEPHONE ENCOUNTER
Auth: [id number]  Date: 6/4/19 thru 9/4/19  CPT: 88443  DX: M51.26, M54.16, M47.816   Outpatient  Procedure: LISA  SX Location: [address]: [address] [place] [address]  Physician: KAL        CPT: [facility], [address] [facility] 26, [id number]     NPR

## 2019-06-04 ENCOUNTER — HOSPITAL ENCOUNTER (OUTPATIENT)
Age: 29
Setting detail: OUTPATIENT SURGERY
Discharge: HOME | End: 2019-06-04
Attending: PHYSICAL MEDICINE & REHABILITATION | Admitting: PHYSICAL MEDICINE & REHABILITATION
Payer: COMMERCIAL

## 2019-06-04 VITALS
RESPIRATION RATE: 17 BRPM | OXYGEN SATURATION: 99 % | WEIGHT: 134 LBS | HEART RATE: 50 BPM | BODY MASS INDEX: 25.3 KG/M2 | SYSTOLIC BLOOD PRESSURE: 110 MMHG | DIASTOLIC BLOOD PRESSURE: 67 MMHG | TEMPERATURE: 97.3 F | HEIGHT: 61 IN

## 2019-06-04 PROCEDURE — 6360000002 HC RX W HCPCS: Performed by: PHYSICAL MEDICINE & REHABILITATION

## 2019-06-04 PROCEDURE — 3600000002 HC SURGERY LEVEL 2 BASE: Performed by: PHYSICAL MEDICINE & REHABILITATION

## 2019-06-04 PROCEDURE — 2500000003 HC RX 250 WO HCPCS: Performed by: PHYSICAL MEDICINE & REHABILITATION

## 2019-06-04 PROCEDURE — 2709999900 HC NON-CHARGEABLE SUPPLY: Performed by: PHYSICAL MEDICINE & REHABILITATION

## 2019-06-04 PROCEDURE — 7100000011 HC PHASE II RECOVERY - ADDTL 15 MIN: Performed by: PHYSICAL MEDICINE & REHABILITATION

## 2019-06-04 PROCEDURE — 2580000003 HC RX 258: Performed by: PHYSICAL MEDICINE & REHABILITATION

## 2019-06-04 PROCEDURE — 99152 MOD SED SAME PHYS/QHP 5/>YRS: CPT | Performed by: PHYSICAL MEDICINE & REHABILITATION

## 2019-06-04 PROCEDURE — 7100000010 HC PHASE II RECOVERY - FIRST 15 MIN: Performed by: PHYSICAL MEDICINE & REHABILITATION

## 2019-06-04 RX ORDER — BUPIVACAINE HYDROCHLORIDE 5 MG/ML
INJECTION, SOLUTION EPIDURAL; INTRACAUDAL PRN
Status: DISCONTINUED | OUTPATIENT
Start: 2019-06-04 | End: 2019-06-04 | Stop reason: ALTCHOICE

## 2019-06-04 RX ORDER — LIDOCAINE HYDROCHLORIDE 10 MG/ML
INJECTION, SOLUTION EPIDURAL; INFILTRATION; INTRACAUDAL; PERINEURAL PRN
Status: DISCONTINUED | OUTPATIENT
Start: 2019-06-04 | End: 2019-06-04 | Stop reason: ALTCHOICE

## 2019-06-04 RX ORDER — METHYLPREDNISOLONE ACETATE 80 MG/ML
INJECTION, SUSPENSION INTRA-ARTICULAR; INTRALESIONAL; INTRAMUSCULAR; SOFT TISSUE PRN
Status: DISCONTINUED | OUTPATIENT
Start: 2019-06-04 | End: 2019-06-04 | Stop reason: ALTCHOICE

## 2019-06-04 RX ORDER — MIDAZOLAM HYDROCHLORIDE 5 MG/ML
INJECTION INTRAMUSCULAR; INTRAVENOUS PRN
Status: DISCONTINUED | OUTPATIENT
Start: 2019-06-04 | End: 2019-06-04 | Stop reason: ALTCHOICE

## 2019-06-04 RX ORDER — SODIUM CHLORIDE, SODIUM LACTATE, POTASSIUM CHLORIDE, CALCIUM CHLORIDE 600; 310; 30; 20 MG/100ML; MG/100ML; MG/100ML; MG/100ML
INJECTION, SOLUTION INTRAVENOUS CONTINUOUS
Status: DISCONTINUED | OUTPATIENT
Start: 2019-06-04 | End: 2019-06-04 | Stop reason: HOSPADM

## 2019-06-04 RX ADMIN — SODIUM CHLORIDE, POTASSIUM CHLORIDE, SODIUM LACTATE AND CALCIUM CHLORIDE: 600; 310; 30; 20 INJECTION, SOLUTION INTRAVENOUS at 09:32

## 2019-06-04 RX ADMIN — LIDOCAINE HYDROCHLORIDE 0.2 ML: 10 INJECTION, SOLUTION EPIDURAL; INFILTRATION; INTRACAUDAL; PERINEURAL at 09:31

## 2019-06-04 ASSESSMENT — PAIN DESCRIPTION - DESCRIPTORS: DESCRIPTORS: DISCOMFORT

## 2019-06-04 ASSESSMENT — PAIN - FUNCTIONAL ASSESSMENT
PAIN_FUNCTIONAL_ASSESSMENT: 0-10
PAIN_FUNCTIONAL_ASSESSMENT: ACTIVITIES ARE NOT PREVENTED

## 2019-06-04 ASSESSMENT — PAIN SCALES - GENERAL: PAINLEVEL_OUTOF10: 0

## 2019-06-04 NOTE — H&P
HISTORY AND PHYSICAL/PRE-SEDATION ASSESSMENT    Patient:  Anusha Cortez   :  1990  Medical Record No.:  [id number]   Date:  2019  Physician:  Sandra Álvarez M.D. Facility: [facility]    HISTORY OF PRESENT ILLNESS:                 The patient is a 26 y.o. female whom presents with lower back and bilateral leg pain. Review of the imaging and physical exam of the patient confirmed the pre-procedure diagnosis. After a thorough discussion of risks, benefits and alternatives informed consent was obtained. Past Medical History:   Past Medical History:   Diagnosis Date    Asthma     as a child    Bacterial vaginosis     Chlamydia     Mental disorder     depression as an adolescent    Sciatic pain       Past Surgical History:     Past Surgical History:   Procedure Laterality Date    AK NJX DX/THER SBST INTRLMNR CRV/THRC W/IMG GDN Left 2018    LEFT LUMBAR FIVE SACRAL ONE TRANSFORAMINAL EPIDURAL STEROID INJECTION SITE CONFIRMED BY FLUOROSOCOPY performed by Sandra Álvarez MD at [address] EXTRACTION  2010     Current Medications:   Prior to Admission medications    Medication Sig Start Date End Date Taking? Authorizing Provider   gabapentin (NEURONTIN) 300 MG capsule Take 1 capsule by mouth nightly for 30 days. 5/15/19 6/14/19 Yes Sandra Álvarez MD     Allergies:  Nickel  Social History:    reports that she has quit smoking. She smoked 0.50 packs per day. She has never used smokeless tobacco. She reports that she does not drink alcohol or use drugs.   Family History:   Family History   Problem Relation Age of Onset    High Blood Pressure Mother     Diabetes Maternal Grandmother     High Blood Pressure Maternal Grandmother     Diabetes Maternal Grandfather     Diabetes Paternal Grandmother     Diabetes Paternal Grandfather     Heart Disease Paternal Grandfather        Vitals: Blood pressure 106/70, pulse 51, temperature 97.3 °F (36.3 °C), temperature source Temporal, resp. rate 17, height 5' 1\" (1.549 m), weight 134 lb (60.8 kg), SpO2 100 %, unknown if currently breastfeeding. PHYSICAL EXAM:  HENT: Airway patent and reviewed  Cardiovascular: Normal rate, regular rhythm, normal heart sounds. Pulmonary/Chest: No wheezes. No rhonchi. No rales. Abdominal: Soft. Bowel sounds are normal. No distension. Extremities: Moves all extremities equally  Lumbar Spine: Painful range of motion, no midline tenderness       Diagnosis:Lumbar radiculopathy    Plan: Proceed with planned procedure      ASA CLASS:         []   I. Normal, healthy adult           [x]   II.  Mild systemic disease            []   III. Severe systemic disease      Mallampati: Mallampati Class II - (soft palate, fauces & uvula are visible)      Sedation plan:   [x]  Local              []  Minimal                  []  General anesthesia    Patient's condition acceptable for planned procedure/sedation. Post Procedure Plan   Return to same level of care   ______________________     The risks and benefits as well as alternatives to the procedure have been discussed with the patient and or family. The patient and or next of kin understands and agrees to proceed.     Carl Jacob M.D.

## 2019-06-18 ENCOUNTER — OFFICE VISIT (OUTPATIENT)
Dept: ORTHOPEDIC SURGERY | Age: 29
End: 2019-06-18
Payer: COMMERCIAL

## 2019-06-18 VITALS — BODY MASS INDEX: 25.31 KG/M2 | WEIGHT: 134.04 LBS | HEIGHT: 61 IN

## 2019-06-18 DIAGNOSIS — M70.62 GREATER TROCHANTERIC BURSITIS OF BOTH HIPS: ICD-10-CM

## 2019-06-18 DIAGNOSIS — M54.16 LUMBAR RADICULOPATHY: ICD-10-CM

## 2019-06-18 DIAGNOSIS — M70.61 GREATER TROCHANTERIC BURSITIS OF BOTH HIPS: ICD-10-CM

## 2019-06-18 DIAGNOSIS — M51.26 HNP (HERNIATED NUCLEUS PULPOSUS), LUMBAR: Primary | ICD-10-CM

## 2019-06-18 DIAGNOSIS — M47.816 SPONDYLOSIS WITHOUT MYELOPATHY OR RADICULOPATHY, LUMBAR REGION: ICD-10-CM

## 2019-06-18 PROCEDURE — G8417 CALC BMI ABV UP PARAM F/U: HCPCS | Performed by: PHYSICAL MEDICINE & REHABILITATION

## 2019-06-18 PROCEDURE — 1036F TOBACCO NON-USER: CPT | Performed by: PHYSICAL MEDICINE & REHABILITATION

## 2019-06-18 PROCEDURE — 99213 OFFICE O/P EST LOW 20 MIN: CPT | Performed by: PHYSICAL MEDICINE & REHABILITATION

## 2019-06-18 PROCEDURE — G8427 DOCREV CUR MEDS BY ELIG CLIN: HCPCS | Performed by: PHYSICAL MEDICINE & REHABILITATION

## 2019-06-18 NOTE — PROGRESS NOTES
6/4/2019    BILATERAL LUMBAR FOUR TRANSFORAMINAL EPIDURAL STEROID INJECTION SITE CONFIRMED BY FLUOROSCOPY performed by Shiela Persaud MD at [facility] DX/THER SBST INTRLMNR CRV/THRC W/IMG GDN Left 8/28/2018    LEFT LUMBAR FIVE SACRAL ONE TRANSFORAMINAL EPIDURAL STEROID INJECTION SITE CONFIRMED BY FLUOROSOCOPY performed by Shiela Persaud MD at [address] EXTRACTION  October 2010     Current Medications:     Current Outpatient Medications:     gabapentin (NEURONTIN) 300 MG capsule, Take 1 capsule by mouth nightly for 30 days. , Disp: 30 capsule, Rfl: 0  Allergies:  Nickel  Social History:    reports that she has quit smoking. She smoked 0.50 packs per day. She has never used smokeless tobacco. She reports that she does not drink alcohol or use drugs. Family History:   Family History   Problem Relation Age of Onset    High Blood Pressure Mother     Diabetes Maternal Grandmother     High Blood Pressure Maternal Grandmother     Diabetes Maternal Grandfather     Diabetes Paternal Grandmother     Diabetes Paternal Grandfather     Heart Disease Paternal Grandfather        REVIEW OF SYSTEMS:   CONSTITUTIONAL: Denies unexplained weight loss, fevers, chills or fatigue  NEUROLOGICAL: Denies unsteady gait or progressive weakness  MUSCULOSKELETAL: Denies joint swelling or redness  GI: Denies nausea, vomiting, diarrhea   : Denies bowel or bladder issues       PHYSICAL EXAM:    Vitals: Height 5' 0.98\" (1.549 m), weight 134 lb 0.6 oz (60.8 kg), unknown if currently breastfeeding. GENERAL EXAM:  · General Apparence: Patient is adequately groomed with no evidence of malnutrition. · Psychiatric: Orientation: The patient is oriented to time, place and person. The patient's mood and affect are appropriate   · Vascular: Examination reveals no swelling and palpation reveals no tenderness in upper or lower extremities. Good capillary refill.    · The lymphatic examination of the neck, axillae and groin reveals all areas to be without enlargement or induration  · Sensation is intact without deficit in the upper and lower extremities to light touch and pinprick  · Coordination of the upper and lower extremities are normal.  · Additional Examinations:  · RIGHT UPPER EXTREMITY:  Inspection/examination of the right upper extremity does not show any tenderness, deformity or injury. Range of motion is unremarkable and pain-free. There is no gross instability. There are no rashes, ulcerations or lesions. Strength and tone are normal. No atrophy or abnormal movements are noted. · LEFT UPPER EXTREMITY: Inspection/examination of the left upper extremity does not show any tenderness, deformity or injury. Range of motion is unremarkable and pain-free. There is no gross instability. There are no rashes, ulcerations or lesions. Strength and tone are normal. No atrophy or abnormal movements are noted. LUMBAR/SACRAL EXAMINATION:  · Inspection: Local inspection shows no step-off or bruising. Lumbar alignment is normal. No instability is noted. · Palpation:   No evidence of tenderness at the midline. Lumbar paraspinal tenderness: Mild L4/5 and L5/S1 tenderness  Bursal tenderness No tenderness bilaterally  There is no paraspinal spasm. · Range of Motion: pain-free ROM  · Strength:   Strength testing is 5/5 in all muscle groups tested. · Special Tests:   Straight leg raise and crossed SLR negative. Wayne's testing is negative bilaterally. FADIR's testing is negative bilaterally. · Skin: There are no rashes, ulcerations or lesions. · Reflexes: Reflexes are symmetrically 2+ at the patellar and ankle tendons. Clonus absent bilaterally at the feet. · Gait & station: normal, patient ambulates without assistance and no ataxia  · Additional Examinations:  · RIGHT LOWER EXTREMITY: Inspection/examination of the right lower extremity does not show any tenderness, deformity or injury.  Range of motion is normal and pain-free. There is no gross instability. There are no rashes, ulcerations or lesions. Strength and tone are normal. No atrophy or abnormal movements are noted. · LEFT LOWER EXTREMITY:  Inspection/examination of the left lower extremity does not show any tenderness, deformity or injury. Range of motion is normal and pain-free. There is no gross instability. There are no rashes, ulcerations or lesions. Strength and tone are normal. No atrophy or abnormal movements are noted. Diagnostic Testing:    No new diagnostics  Results for orders placed or performed during the hospital encounter of 08/21/16   Urine reflex to culture   Result Value Ref Range    Color, UA Yellow Straw/Yellow    Clarity, UA Clear Clear    Glucose, Ur Negative Negative mg/dL    Bilirubin Urine Negative Negative    Ketones, Urine TRACE (A) Negative mg/dL    Specific Gravity, UA >=1.030 1.005 - 1.030    Blood, Urine Negative Negative    pH, UA 5.5 5.0 - 8.0    Protein, UA TRACE (A) Negative mg/dL    Urobilinogen, Urine 0.2 <2.0 E.U./dL    Nitrite, Urine Negative Negative    Leukocyte Esterase, Urine Negative Negative    Microscopic Examination YES     Urine Reflex to Culture Not Indicated     Urine Type Not Specified    Microscopic Urinalysis   Result Value Ref Range    WBC, UA 0-2 0 - 5 /HPF    RBC, UA 0-2 0 - 2 /HPF    Epi Cells 3-5 /HPF    Bacteria, UA 1+ (A) /HPF    Crystals 1+ Ca. Oxalate (A) /HPF     Impression:       1. HNP (herniated nucleus pulposus), lumbar    2. Lumbar radiculopathy    3. Spondylosis without myelopathy or radiculopathy, lumbar region    4. Greater trochanteric bursitis of both hips        Plan:  Clinical Course: Above diagnoses are improving     I discussed the diagnosis and the treatment options with Shaddavidjeet Perazakirk today.      In Summary:  The various treatment options were outlined and discussed with [facility]injeet [facility] including:  Conservative care options: physical therapy, ice, medications, bracing, and

## 2019-08-27 ENCOUNTER — OFFICE VISIT (OUTPATIENT)
Dept: ORTHOPEDIC SURGERY | Age: 29
End: 2019-08-27
Payer: COMMERCIAL

## 2019-08-27 VITALS
SYSTOLIC BLOOD PRESSURE: 114 MMHG | HEIGHT: 61 IN | BODY MASS INDEX: 24.55 KG/M2 | DIASTOLIC BLOOD PRESSURE: 70 MMHG | RESPIRATION RATE: 16 BRPM | HEART RATE: 56 BPM | WEIGHT: 130 LBS

## 2019-08-27 DIAGNOSIS — M51.26 HNP (HERNIATED NUCLEUS PULPOSUS), LUMBAR: Primary | ICD-10-CM

## 2019-08-27 DIAGNOSIS — R20.2 PARESTHESIA OF BOTH LOWER EXTREMITIES: ICD-10-CM

## 2019-08-27 PROCEDURE — 99214 OFFICE O/P EST MOD 30 MIN: CPT | Performed by: PHYSICAL MEDICINE & REHABILITATION

## 2019-08-27 PROCEDURE — G8420 CALC BMI NORM PARAMETERS: HCPCS | Performed by: PHYSICAL MEDICINE & REHABILITATION

## 2019-08-27 PROCEDURE — 1036F TOBACCO NON-USER: CPT | Performed by: PHYSICAL MEDICINE & REHABILITATION

## 2019-08-27 PROCEDURE — G8427 DOCREV CUR MEDS BY ELIG CLIN: HCPCS | Performed by: PHYSICAL MEDICINE & REHABILITATION

## 2019-08-27 RX ORDER — METHYLPREDNISOLONE 4 MG/1
TABLET ORAL
Qty: 1 KIT | Refills: 0 | Status: SHIPPED | OUTPATIENT
Start: 2019-08-27 | End: 2019-09-10 | Stop reason: ALTCHOICE

## 2019-09-10 ENCOUNTER — OFFICE VISIT (OUTPATIENT)
Dept: ORTHOPEDIC SURGERY | Age: 29
End: 2019-09-10
Payer: COMMERCIAL

## 2019-09-10 VITALS
HEART RATE: 73 BPM | RESPIRATION RATE: 16 BRPM | HEIGHT: 61 IN | DIASTOLIC BLOOD PRESSURE: 70 MMHG | BODY MASS INDEX: 24.55 KG/M2 | WEIGHT: 130 LBS | SYSTOLIC BLOOD PRESSURE: 109 MMHG

## 2019-09-10 DIAGNOSIS — M51.26 HNP (HERNIATED NUCLEUS PULPOSUS), LUMBAR: Primary | ICD-10-CM

## 2019-09-10 DIAGNOSIS — M54.16 LUMBAR RADICULOPATHY: ICD-10-CM

## 2019-09-10 DIAGNOSIS — M47.816 SPONDYLOSIS WITHOUT MYELOPATHY OR RADICULOPATHY, LUMBAR REGION: ICD-10-CM

## 2019-09-10 PROCEDURE — 99213 OFFICE O/P EST LOW 20 MIN: CPT | Performed by: PHYSICIAN ASSISTANT

## 2019-09-10 PROCEDURE — 1036F TOBACCO NON-USER: CPT | Performed by: PHYSICIAN ASSISTANT

## 2019-09-10 PROCEDURE — G8427 DOCREV CUR MEDS BY ELIG CLIN: HCPCS | Performed by: PHYSICIAN ASSISTANT

## 2019-09-10 PROCEDURE — G8420 CALC BMI NORM PARAMETERS: HCPCS | Performed by: PHYSICIAN ASSISTANT

## 2019-09-10 NOTE — PROGRESS NOTES
Follow up: Ebony Richardson  1990  [id number]         Chief Complaint   Patient presents with    Lower Back Pain    Leg Pain     Right          HISTORY OF PRESENT ILLNESS:  Ms. Saad Patel is a 26 y.o. female returns for a follow up visit for multiple medical problems. Her current presenting problems are   1. HNP (herniated nucleus pulposus), lumbar    2. Lumbar radiculopathy    3. Spondylosis without myelopathy or radiculopathy, lumbar region    . As per information/history obtained from the PADT(patient assessment and documentation tool) - She complains of pain in the lower back with radiation to the upper leg Right and lower leg Right She rates the pain 4/10 and describes it as sharp, dull, aching, throbbing. Pain is made worse by: movement. She denies side effects from the current pain regimen. Patient reports that since the last follow-up visit the physical functioning is worse, family/social relationships are worse, mood is worse and sleep patterns are worse and that the overall functioning is worse. Patient denies neurological bowel or bladder. Patient in office for lower back pain with radiation into her right leg which extends down to the foot. She describes that she did have left leg pain, but this is not as bad as it was before the previous ESIs. She states that her pain level today is a 4/10. She states that Sunday while hosting her daughter's birthday party she was unable to walk. She states that at that time her pain level was a 6/10. Today her pain is a sharp, dull, aching, throbbing pain and is aggravated by certain movements. She has new and worsening symptoms since a previous MRI was undertaken back in July of 2018. She describes numbness and the occasional tingling down the right leg. She denies any new injuries or triggers. The patient was recently treated with a Medrol Dosepak which did not help with her symptoms.   This was prescribed and she was last seen by Dr. Philip Neal Interventional:  After further imaging is obtained, interventional options will be reviewed and recommended. 5. Follow up:  1-2 weeks      Glynn Robles was instructed to call the office if her symptoms worsen or if new symptoms appear prior to the next scheduled visit. She is specifically instructed to contact the office between now & her scheduled appointment if she has concerns related to her condition or if she needs assistance in scheduling the above tests. She is welcome to call for an appointment sooner if she has any additional concerns or questions. Shashank Cali CRMA am scribing for and in the presence of Arleen Cuadra 09/10/19 8:56 AM .    The physical examination was performed between the patient and FLORENCE Cuadra. All counseling during the appointment was performed between the patient and provider. Luma Stephenson PA-C, personally performed the services described in this documentation as scribed by Karen Zhou CRMA in my presence and it is both accurate and complete. BLOSSOM Mejia PA-C  Board Certified by the [employer] on Certification of Physician Assistants  [address]  Partner of [facility] ([facility])      This dictation was performed with a verbal recognition program [facility]) and it was checked for errors. It is possible that there are still dictated errors within this office note. If so, please bring any errors to my attention for an addendum. All efforts were made to ensure that this office note is accurate.

## 2019-09-23 ENCOUNTER — TELEPHONE (OUTPATIENT)
Dept: ORTHOPEDIC SURGERY | Age: 29
End: 2019-09-23

## 2019-09-24 ENCOUNTER — OFFICE VISIT (OUTPATIENT)
Dept: ORTHOPEDIC SURGERY | Age: 29
End: 2019-09-24
Payer: COMMERCIAL

## 2019-09-24 VITALS
RESPIRATION RATE: 16 BRPM | DIASTOLIC BLOOD PRESSURE: 73 MMHG | BODY MASS INDEX: 24.56 KG/M2 | HEART RATE: 71 BPM | HEIGHT: 61 IN | SYSTOLIC BLOOD PRESSURE: 107 MMHG | WEIGHT: 130.07 LBS

## 2019-09-24 DIAGNOSIS — M47.816 SPONDYLOSIS WITHOUT MYELOPATHY OR RADICULOPATHY, LUMBAR REGION: ICD-10-CM

## 2019-09-24 DIAGNOSIS — M51.26 HNP (HERNIATED NUCLEUS PULPOSUS), LUMBAR: Primary | ICD-10-CM

## 2019-09-24 DIAGNOSIS — M54.16 LUMBAR RADICULOPATHY: ICD-10-CM

## 2019-09-24 PROCEDURE — 99213 OFFICE O/P EST LOW 20 MIN: CPT | Performed by: PHYSICIAN ASSISTANT

## 2019-09-24 PROCEDURE — 1036F TOBACCO NON-USER: CPT | Performed by: PHYSICIAN ASSISTANT

## 2019-09-24 PROCEDURE — G8427 DOCREV CUR MEDS BY ELIG CLIN: HCPCS | Performed by: PHYSICIAN ASSISTANT

## 2019-09-24 PROCEDURE — G8420 CALC BMI NORM PARAMETERS: HCPCS | Performed by: PHYSICIAN ASSISTANT

## 2019-09-24 NOTE — PROGRESS NOTES
tenderness, deformity or injury. Range of motion is normal and pain-free. There is no gross instability. There are no rashes, ulcerations or lesions. Strength and tone are normal. No atrophy or abnormal movements are noted. · LEFT LOWER EXTREMITY:  Inspection/examination of the left lower extremity does not show any tenderness, deformity or injury. Range of motion is normal and pain-free. There is no gross instability. There are no rashes, ulcerations or lesions. Strength and tone are normal. No atrophy or abnormal movements are noted. Diagnostic Testing:    MR Lumbar spine shows  07/25/2018  CONCLUSION:   1. Left paracentral L5-S1 disc herniation indenting the anterior thecal sac. 2. Central L4-5 disc herniation indenting the anterior thecal sac. Results for orders placed or performed during the hospital encounter of 08/21/16   Urine reflex to culture   Result Value Ref Range    Color, UA Yellow Straw/Yellow    Clarity, UA Clear Clear    Glucose, Ur Negative Negative mg/dL    Bilirubin Urine Negative Negative    Ketones, Urine TRACE (A) Negative mg/dL    Specific Gravity, UA >=1.030 1.005 - 1.030    Blood, Urine Negative Negative    pH, UA 5.5 5.0 - 8.0    Protein, UA TRACE (A) Negative mg/dL    Urobilinogen, Urine 0.2 <2.0 E.U./dL    Nitrite, Urine Negative Negative    Leukocyte Esterase, Urine Negative Negative    Microscopic Examination YES     Urine Reflex to Culture Not Indicated     Urine Type Not Specified    Microscopic Urinalysis   Result Value Ref Range    WBC, UA 0-2 0 - 5 /HPF    RBC, UA 0-2 0 - 2 /HPF    Epi Cells 3-5 /HPF    Bacteria, UA 1+ (A) /HPF    Crystals 1+ Ca. Oxalate (A) /HPF     Impression:       1. HNP (herniated nucleus pulposus), lumbar    2. Lumbar radiculopathy    3. Spondylosis without myelopathy or radiculopathy, lumbar region        Plan:  Clinical Course: shows no change    I discussed the diagnosis and the treatment options with Keshav Mendez today.      In

## 2019-10-03 ENCOUNTER — HOSPITAL ENCOUNTER (OUTPATIENT)
Dept: PHYSICAL THERAPY | Age: 29
Setting detail: THERAPIES SERIES
Discharge: HOME | End: 2019-10-03
Payer: COMMERCIAL

## 2019-10-03 PROCEDURE — 97110 THERAPEUTIC EXERCISES: CPT

## 2019-10-03 PROCEDURE — 97112 NEUROMUSCULAR REEDUCATION: CPT

## 2019-10-03 PROCEDURE — 97161 PT EVAL LOW COMPLEX 20 MIN: CPT

## 2019-10-07 ENCOUNTER — HOSPITAL ENCOUNTER (OUTPATIENT)
Dept: PHYSICAL THERAPY | Age: 29
Setting detail: THERAPIES SERIES
Discharge: HOME | End: 2019-10-07
Payer: COMMERCIAL

## 2019-10-07 PROCEDURE — 97110 THERAPEUTIC EXERCISES: CPT

## 2019-10-07 PROCEDURE — 97112 NEUROMUSCULAR REEDUCATION: CPT

## 2019-10-11 ENCOUNTER — HOSPITAL ENCOUNTER (OUTPATIENT)
Dept: PHYSICAL THERAPY | Age: 29
Setting detail: THERAPIES SERIES
Discharge: HOME | End: 2019-10-11
Payer: COMMERCIAL

## 2019-10-11 PROCEDURE — 97110 THERAPEUTIC EXERCISES: CPT

## 2019-10-11 PROCEDURE — 97112 NEUROMUSCULAR REEDUCATION: CPT

## 2019-10-11 PROCEDURE — 97140 MANUAL THERAPY 1/> REGIONS: CPT

## 2019-10-14 ENCOUNTER — HOSPITAL ENCOUNTER (OUTPATIENT)
Dept: PHYSICAL THERAPY | Age: 29
Setting detail: THERAPIES SERIES
Discharge: HOME | End: 2019-10-14
Payer: COMMERCIAL

## 2019-10-14 ENCOUNTER — TELEPHONE (OUTPATIENT)
Dept: ORTHOPEDIC SURGERY | Age: 29
End: 2019-10-14

## 2019-10-14 PROCEDURE — 97140 MANUAL THERAPY 1/> REGIONS: CPT

## 2019-10-14 PROCEDURE — 97110 THERAPEUTIC EXERCISES: CPT

## 2019-10-14 PROCEDURE — 97112 NEUROMUSCULAR REEDUCATION: CPT

## 2019-10-15 ENCOUNTER — OFFICE VISIT (OUTPATIENT)
Dept: ORTHOPEDIC SURGERY | Age: 29
End: 2019-10-15
Payer: COMMERCIAL

## 2019-10-15 VITALS
BODY MASS INDEX: 24.56 KG/M2 | DIASTOLIC BLOOD PRESSURE: 81 MMHG | WEIGHT: 130.07 LBS | HEART RATE: 70 BPM | HEIGHT: 61 IN | SYSTOLIC BLOOD PRESSURE: 130 MMHG

## 2019-10-15 DIAGNOSIS — M48.062 LUMBAR STENOSIS WITH NEUROGENIC CLAUDICATION: ICD-10-CM

## 2019-10-15 DIAGNOSIS — M54.16 LUMBAR RADICULOPATHY: ICD-10-CM

## 2019-10-15 DIAGNOSIS — M53.3 SACROILIAC JOINT DYSFUNCTION OF LEFT SIDE: ICD-10-CM

## 2019-10-15 DIAGNOSIS — M51.26 HNP (HERNIATED NUCLEUS PULPOSUS), LUMBAR: Primary | ICD-10-CM

## 2019-10-15 PROCEDURE — G8420 CALC BMI NORM PARAMETERS: HCPCS | Performed by: PHYSICAL MEDICINE & REHABILITATION

## 2019-10-15 PROCEDURE — 20552 NJX 1/MLT TRIGGER POINT 1/2: CPT | Performed by: PHYSICAL MEDICINE & REHABILITATION

## 2019-10-15 PROCEDURE — 99214 OFFICE O/P EST MOD 30 MIN: CPT | Performed by: PHYSICAL MEDICINE & REHABILITATION

## 2019-10-15 PROCEDURE — G8484 FLU IMMUNIZE NO ADMIN: HCPCS | Performed by: PHYSICAL MEDICINE & REHABILITATION

## 2019-10-15 PROCEDURE — 76942 ECHO GUIDE FOR BIOPSY: CPT | Performed by: PHYSICAL MEDICINE & REHABILITATION

## 2019-10-15 PROCEDURE — G8427 DOCREV CUR MEDS BY ELIG CLIN: HCPCS | Performed by: PHYSICAL MEDICINE & REHABILITATION

## 2019-10-15 PROCEDURE — 1036F TOBACCO NON-USER: CPT | Performed by: PHYSICAL MEDICINE & REHABILITATION

## 2019-10-15 RX ORDER — BACLOFEN 10 MG/1
10 TABLET ORAL NIGHTLY
Qty: 30 TABLET | Refills: 0 | Status: SHIPPED | OUTPATIENT
Start: 2019-10-15 | End: 2019-11-14

## 2019-10-17 ENCOUNTER — APPOINTMENT (OUTPATIENT)
Dept: PHYSICAL THERAPY | Age: 29
End: 2019-10-17
Payer: COMMERCIAL

## 2019-10-28 ENCOUNTER — TELEPHONE (OUTPATIENT)
Dept: ORTHOPEDIC SURGERY | Age: 29
End: 2019-10-28

## 2019-11-05 ENCOUNTER — OFFICE VISIT (OUTPATIENT)
Dept: ORTHOPEDIC SURGERY | Age: 29
End: 2019-11-05
Payer: COMMERCIAL

## 2019-11-05 VITALS
HEIGHT: 61 IN | DIASTOLIC BLOOD PRESSURE: 80 MMHG | SYSTOLIC BLOOD PRESSURE: 124 MMHG | BODY MASS INDEX: 24.56 KG/M2 | WEIGHT: 130.07 LBS | HEART RATE: 82 BPM

## 2019-11-05 DIAGNOSIS — M51.26 HNP (HERNIATED NUCLEUS PULPOSUS), LUMBAR: Primary | ICD-10-CM

## 2019-11-05 DIAGNOSIS — M54.16 LUMBAR RADICULOPATHY: ICD-10-CM

## 2019-11-05 DIAGNOSIS — M48.062 LUMBAR STENOSIS WITH NEUROGENIC CLAUDICATION: ICD-10-CM

## 2019-11-05 PROCEDURE — 1036F TOBACCO NON-USER: CPT | Performed by: PHYSICAL MEDICINE & REHABILITATION

## 2019-11-05 PROCEDURE — G8484 FLU IMMUNIZE NO ADMIN: HCPCS | Performed by: PHYSICAL MEDICINE & REHABILITATION

## 2019-11-05 PROCEDURE — G8427 DOCREV CUR MEDS BY ELIG CLIN: HCPCS | Performed by: PHYSICAL MEDICINE & REHABILITATION

## 2019-11-05 PROCEDURE — 99214 OFFICE O/P EST MOD 30 MIN: CPT | Performed by: PHYSICAL MEDICINE & REHABILITATION

## 2019-11-05 PROCEDURE — G8420 CALC BMI NORM PARAMETERS: HCPCS | Performed by: PHYSICAL MEDICINE & REHABILITATION

## 2019-11-14 ENCOUNTER — OFFICE VISIT (OUTPATIENT)
Dept: ORTHOPEDIC SURGERY | Age: 29
End: 2019-11-14
Payer: COMMERCIAL

## 2019-11-14 VITALS — BODY MASS INDEX: 24.56 KG/M2 | HEIGHT: 61 IN | WEIGHT: 130.07 LBS

## 2019-11-14 DIAGNOSIS — M54.16 LUMBAR RADICULOPATHY: ICD-10-CM

## 2019-11-14 DIAGNOSIS — M51.26 HNP (HERNIATED NUCLEUS PULPOSUS), LUMBAR: Primary | ICD-10-CM

## 2019-11-14 PROCEDURE — 99203 OFFICE O/P NEW LOW 30 MIN: CPT | Performed by: PHYSICIAN ASSISTANT

## 2019-11-14 PROCEDURE — G8420 CALC BMI NORM PARAMETERS: HCPCS | Performed by: PHYSICIAN ASSISTANT

## 2019-11-14 PROCEDURE — 1036F TOBACCO NON-USER: CPT | Performed by: PHYSICIAN ASSISTANT

## 2019-11-14 PROCEDURE — G8427 DOCREV CUR MEDS BY ELIG CLIN: HCPCS | Performed by: PHYSICIAN ASSISTANT

## 2019-11-14 PROCEDURE — G8484 FLU IMMUNIZE NO ADMIN: HCPCS | Performed by: PHYSICIAN ASSISTANT

## 2019-11-15 ENCOUNTER — TELEPHONE (OUTPATIENT)
Dept: ORTHOPEDIC SURGERY | Age: 29
End: 2019-11-15

## 2019-12-02 ENCOUNTER — TELEPHONE (OUTPATIENT)
Dept: ORTHOPEDIC SURGERY | Age: 29
End: 2019-12-02

## 2019-12-05 ENCOUNTER — TELEPHONE (OUTPATIENT)
Dept: ORTHOPEDIC SURGERY | Age: 29
End: 2019-12-05

## 2019-12-10 ENCOUNTER — TELEPHONE (OUTPATIENT)
Dept: ORTHOPEDIC SURGERY | Age: 29
End: 2019-12-10

## 2019-12-19 ENCOUNTER — ANESTHESIA EVENT (OUTPATIENT)
Dept: OPERATING ROOM | Age: 29
End: 2019-12-19
Payer: COMMERCIAL

## 2019-12-20 ENCOUNTER — HOSPITAL ENCOUNTER (OUTPATIENT)
Age: 29
Setting detail: OUTPATIENT SURGERY
Discharge: HOME | End: 2019-12-20
Attending: ORTHOPAEDIC SURGERY | Admitting: ORTHOPAEDIC SURGERY
Payer: COMMERCIAL

## 2019-12-20 ENCOUNTER — ANESTHESIA (OUTPATIENT)
Dept: OPERATING ROOM | Age: 29
End: 2019-12-20
Payer: COMMERCIAL

## 2019-12-20 ENCOUNTER — HOSPITAL ENCOUNTER (OUTPATIENT)
Dept: GENERAL RADIOLOGY | Age: 29
Discharge: HOME | End: 2019-12-20
Attending: ORTHOPAEDIC SURGERY
Payer: COMMERCIAL

## 2019-12-20 VITALS
SYSTOLIC BLOOD PRESSURE: 98 MMHG | DIASTOLIC BLOOD PRESSURE: 63 MMHG | RESPIRATION RATE: 29 BRPM | TEMPERATURE: 98.6 F | OXYGEN SATURATION: 94 %

## 2019-12-20 VITALS
OXYGEN SATURATION: 97 % | DIASTOLIC BLOOD PRESSURE: 69 MMHG | HEIGHT: 61 IN | HEART RATE: 44 BPM | WEIGHT: 132 LBS | RESPIRATION RATE: 10 BRPM | SYSTOLIC BLOOD PRESSURE: 120 MMHG | BODY MASS INDEX: 24.92 KG/M2 | TEMPERATURE: 98.4 F

## 2019-12-20 DIAGNOSIS — R52 PAIN: ICD-10-CM

## 2019-12-20 DIAGNOSIS — M51.16 LUMBAR DISC HERNIATION WITH RADICULOPATHY: Primary | ICD-10-CM

## 2019-12-20 PROCEDURE — 3700000000 HC ANESTHESIA ATTENDED CARE: Performed by: ORTHOPAEDIC SURGERY

## 2019-12-20 PROCEDURE — 6360000002 HC RX W HCPCS: Performed by: ORTHOPAEDIC SURGERY

## 2019-12-20 PROCEDURE — 2580000003 HC RX 258: Performed by: ANESTHESIOLOGY

## 2019-12-20 PROCEDURE — 6360000002 HC RX W HCPCS

## 2019-12-20 PROCEDURE — 2500000003 HC RX 250 WO HCPCS

## 2019-12-20 PROCEDURE — 7100000001 HC PACU RECOVERY - ADDTL 15 MIN: Performed by: ORTHOPAEDIC SURGERY

## 2019-12-20 PROCEDURE — 3209999900 FLUORO FOR SURGICAL PROCEDURES

## 2019-12-20 PROCEDURE — 2580000003 HC RX 258: Performed by: ORTHOPAEDIC SURGERY

## 2019-12-20 PROCEDURE — 2500000003 HC RX 250 WO HCPCS: Performed by: ORTHOPAEDIC SURGERY

## 2019-12-20 PROCEDURE — 3600000004 HC SURGERY LEVEL 4 BASE: Performed by: ORTHOPAEDIC SURGERY

## 2019-12-20 PROCEDURE — 72100 X-RAY EXAM L-S SPINE 2/3 VWS: CPT

## 2019-12-20 PROCEDURE — 6360000002 HC RX W HCPCS: Performed by: ANESTHESIOLOGY

## 2019-12-20 PROCEDURE — 3700000001 HC ADD 15 MINUTES (ANESTHESIA): Performed by: ORTHOPAEDIC SURGERY

## 2019-12-20 PROCEDURE — 7100000000 HC PACU RECOVERY - FIRST 15 MIN: Performed by: ORTHOPAEDIC SURGERY

## 2019-12-20 PROCEDURE — 7100000010 HC PHASE II RECOVERY - FIRST 15 MIN: Performed by: ORTHOPAEDIC SURGERY

## 2019-12-20 PROCEDURE — 6370000000 HC RX 637 (ALT 250 FOR IP): Performed by: ANESTHESIOLOGY

## 2019-12-20 PROCEDURE — 7100000011 HC PHASE II RECOVERY - ADDTL 15 MIN: Performed by: ORTHOPAEDIC SURGERY

## 2019-12-20 PROCEDURE — 2709999900 HC NON-CHARGEABLE SUPPLY: Performed by: ORTHOPAEDIC SURGERY

## 2019-12-20 PROCEDURE — 2720000010 HC SURG SUPPLY STERILE: Performed by: ORTHOPAEDIC SURGERY

## 2019-12-20 PROCEDURE — 3600000014 HC SURGERY LEVEL 4 ADDTL 15MIN: Performed by: ORTHOPAEDIC SURGERY

## 2019-12-20 RX ORDER — HYDROCODONE BITARTRATE AND ACETAMINOPHEN 5; 325 MG/1; MG/1
1 TABLET ORAL EVERY 6 HOURS PRN
Status: DISCONTINUED | OUTPATIENT
Start: 2019-12-20 | End: 2019-12-20 | Stop reason: HOSPADM

## 2019-12-20 RX ORDER — HYDRALAZINE HYDROCHLORIDE 20 MG/ML
5 INJECTION INTRAMUSCULAR; INTRAVENOUS EVERY 10 MIN PRN
Status: DISCONTINUED | OUTPATIENT
Start: 2019-12-20 | End: 2019-12-20 | Stop reason: HOSPADM

## 2019-12-20 RX ORDER — MIDAZOLAM HYDROCHLORIDE 1 MG/ML
INJECTION INTRAMUSCULAR; INTRAVENOUS
Status: DISCONTINUED
Start: 2019-12-20 | End: 2019-12-20 | Stop reason: HOSPADM

## 2019-12-20 RX ORDER — OXYCODONE HYDROCHLORIDE AND ACETAMINOPHEN 5; 325 MG/1; MG/1
2 TABLET ORAL EVERY 4 HOURS PRN
Qty: 50 TABLET | Refills: 0 | Status: SHIPPED | OUTPATIENT
Start: 2019-12-20 | End: 2019-12-20 | Stop reason: HOSPADM

## 2019-12-20 RX ORDER — OXYCODONE HYDROCHLORIDE AND ACETAMINOPHEN 5; 325 MG/1; MG/1
1 TABLET ORAL PRN
Status: DISCONTINUED | OUTPATIENT
Start: 2019-12-20 | End: 2019-12-20

## 2019-12-20 RX ORDER — ONDANSETRON 2 MG/ML
INJECTION INTRAMUSCULAR; INTRAVENOUS PRN
Status: DISCONTINUED | OUTPATIENT
Start: 2019-12-20 | End: 2019-12-20 | Stop reason: SDUPTHER

## 2019-12-20 RX ORDER — MORPHINE SULFATE 2 MG/ML
1 INJECTION, SOLUTION INTRAMUSCULAR; INTRAVENOUS EVERY 5 MIN PRN
Status: DISCONTINUED | OUTPATIENT
Start: 2019-12-20 | End: 2019-12-20 | Stop reason: HOSPADM

## 2019-12-20 RX ORDER — KETAMINE HYDROCHLORIDE 100 MG/ML
INJECTION, SOLUTION INTRAMUSCULAR; INTRAVENOUS PRN
Status: DISCONTINUED | OUTPATIENT
Start: 2019-12-20 | End: 2019-12-20 | Stop reason: SDUPTHER

## 2019-12-20 RX ORDER — MORPHINE SULFATE 2 MG/ML
2 INJECTION, SOLUTION INTRAMUSCULAR; INTRAVENOUS EVERY 5 MIN PRN
Status: DISCONTINUED | OUTPATIENT
Start: 2019-12-20 | End: 2019-12-20 | Stop reason: HOSPADM

## 2019-12-20 RX ORDER — LIDOCAINE HYDROCHLORIDE 20 MG/ML
INJECTION, SOLUTION INFILTRATION; PERINEURAL PRN
Status: DISCONTINUED | OUTPATIENT
Start: 2019-12-20 | End: 2019-12-20 | Stop reason: SDUPTHER

## 2019-12-20 RX ORDER — PROMETHAZINE HYDROCHLORIDE 25 MG/ML
6.25 INJECTION, SOLUTION INTRAMUSCULAR; INTRAVENOUS
Status: DISCONTINUED | OUTPATIENT
Start: 2019-12-20 | End: 2019-12-20 | Stop reason: HOSPADM

## 2019-12-20 RX ORDER — BUPIVACAINE HYDROCHLORIDE AND EPINEPHRINE 2.5; 5 MG/ML; UG/ML
INJECTION, SOLUTION EPIDURAL; INFILTRATION; INTRACAUDAL; PERINEURAL PRN
Status: DISCONTINUED | OUTPATIENT
Start: 2019-12-20 | End: 2019-12-20 | Stop reason: ALTCHOICE

## 2019-12-20 RX ORDER — MIDAZOLAM HYDROCHLORIDE 1 MG/ML
1 INJECTION INTRAMUSCULAR; INTRAVENOUS ONCE
Status: COMPLETED | OUTPATIENT
Start: 2019-12-20 | End: 2019-12-20

## 2019-12-20 RX ORDER — DIPHENHYDRAMINE HYDROCHLORIDE 50 MG/ML
12.5 INJECTION INTRAMUSCULAR; INTRAVENOUS
Status: DISCONTINUED | OUTPATIENT
Start: 2019-12-20 | End: 2019-12-20 | Stop reason: HOSPADM

## 2019-12-20 RX ORDER — LABETALOL HYDROCHLORIDE 5 MG/ML
5 INJECTION, SOLUTION INTRAVENOUS EVERY 10 MIN PRN
Status: DISCONTINUED | OUTPATIENT
Start: 2019-12-20 | End: 2019-12-20 | Stop reason: HOSPADM

## 2019-12-20 RX ORDER — OXYCODONE HYDROCHLORIDE AND ACETAMINOPHEN 5; 325 MG/1; MG/1
2 TABLET ORAL PRN
Status: DISCONTINUED | OUTPATIENT
Start: 2019-12-20 | End: 2019-12-20

## 2019-12-20 RX ORDER — DEXAMETHASONE SODIUM PHOSPHATE 4 MG/ML
INJECTION, SOLUTION INTRA-ARTICULAR; INTRALESIONAL; INTRAMUSCULAR; INTRAVENOUS; SOFT TISSUE PRN
Status: DISCONTINUED | OUTPATIENT
Start: 2019-12-20 | End: 2019-12-20 | Stop reason: SDUPTHER

## 2019-12-20 RX ORDER — SODIUM CHLORIDE, SODIUM LACTATE, POTASSIUM CHLORIDE, CALCIUM CHLORIDE 600; 310; 30; 20 MG/100ML; MG/100ML; MG/100ML; MG/100ML
INJECTION, SOLUTION INTRAVENOUS CONTINUOUS
Status: DISCONTINUED | OUTPATIENT
Start: 2019-12-20 | End: 2019-12-20 | Stop reason: HOSPADM

## 2019-12-20 RX ORDER — DOCUSATE SODIUM 100 MG/1
100 CAPSULE, LIQUID FILLED ORAL 2 TIMES DAILY PRN
Qty: 30 CAPSULE | Refills: 1 | Status: SHIPPED | OUTPATIENT
Start: 2019-12-20 | End: 2020-08-18

## 2019-12-20 RX ORDER — KETOROLAC TROMETHAMINE 30 MG/ML
INJECTION, SOLUTION INTRAMUSCULAR; INTRAVENOUS PRN
Status: DISCONTINUED | OUTPATIENT
Start: 2019-12-20 | End: 2019-12-20 | Stop reason: SDUPTHER

## 2019-12-20 RX ORDER — HYDROCODONE BITARTRATE AND ACETAMINOPHEN 5; 325 MG/1; MG/1
2 TABLET ORAL EVERY 4 HOURS PRN
Qty: 50 TABLET | Refills: 0 | Status: SHIPPED | OUTPATIENT
Start: 2019-12-20 | End: 2020-01-20

## 2019-12-20 RX ORDER — SUCCINYLCHOLINE CHLORIDE 20 MG/ML
INJECTION INTRAMUSCULAR; INTRAVENOUS PRN
Status: DISCONTINUED | OUTPATIENT
Start: 2019-12-20 | End: 2019-12-20 | Stop reason: SDUPTHER

## 2019-12-20 RX ORDER — ONDANSETRON 2 MG/ML
4 INJECTION INTRAMUSCULAR; INTRAVENOUS
Status: COMPLETED | OUTPATIENT
Start: 2019-12-20 | End: 2019-12-20

## 2019-12-20 RX ORDER — PROPOFOL 10 MG/ML
INJECTION, EMULSION INTRAVENOUS PRN
Status: DISCONTINUED | OUTPATIENT
Start: 2019-12-20 | End: 2019-12-20 | Stop reason: SDUPTHER

## 2019-12-20 RX ORDER — ACETAMINOPHEN 10 MG/ML
1000 INJECTION, SOLUTION INTRAVENOUS
Status: COMPLETED | OUTPATIENT
Start: 2019-12-20 | End: 2019-12-20

## 2019-12-20 RX ADMIN — DEXAMETHASONE SODIUM PHOSPHATE 8 MG: 4 INJECTION, SOLUTION INTRAMUSCULAR; INTRAVENOUS at 10:44

## 2019-12-20 RX ADMIN — LIDOCAINE HYDROCHLORIDE 100 MG: 20 INJECTION, SOLUTION INFILTRATION; PERINEURAL at 10:44

## 2019-12-20 RX ADMIN — HYDROMORPHONE HYDROCHLORIDE 0.5 MG: 1 INJECTION, SOLUTION INTRAMUSCULAR; INTRAVENOUS; SUBCUTANEOUS at 12:33

## 2019-12-20 RX ADMIN — ACETAMINOPHEN 1000 MG: 10 INJECTION, SOLUTION INTRAVENOUS at 09:10

## 2019-12-20 RX ADMIN — ONDANSETRON 4 MG: 2 INJECTION INTRAMUSCULAR; INTRAVENOUS at 10:44

## 2019-12-20 RX ADMIN — KETOROLAC TROMETHAMINE 15 MG: 30 INJECTION, SOLUTION INTRAMUSCULAR; INTRAVENOUS at 11:46

## 2019-12-20 RX ADMIN — MORPHINE SULFATE 1 MG: 2 INJECTION, SOLUTION INTRAMUSCULAR; INTRAVENOUS at 13:01

## 2019-12-20 RX ADMIN — Medication 2 G: at 10:44

## 2019-12-20 RX ADMIN — HYDROMORPHONE HYDROCHLORIDE 0.5 MG: 1 INJECTION, SOLUTION INTRAMUSCULAR; INTRAVENOUS; SUBCUTANEOUS at 12:26

## 2019-12-20 RX ADMIN — ONDANSETRON 4 MG: 2 INJECTION INTRAMUSCULAR; INTRAVENOUS at 15:38

## 2019-12-20 RX ADMIN — HYDROMORPHONE HYDROCHLORIDE 0.5 MG: 1 INJECTION, SOLUTION INTRAMUSCULAR; INTRAVENOUS; SUBCUTANEOUS at 12:19

## 2019-12-20 RX ADMIN — PROPOFOL 200 MG: 10 INJECTION, EMULSION INTRAVENOUS at 10:44

## 2019-12-20 RX ADMIN — MIDAZOLAM 1 MG: 1 INJECTION INTRAMUSCULAR; INTRAVENOUS at 12:41

## 2019-12-20 RX ADMIN — HYDROCODONE BITARTRATE AND ACETAMINOPHEN 1 TABLET: 5; 325 TABLET ORAL at 14:28

## 2019-12-20 RX ADMIN — SUCCINYLCHOLINE CHLORIDE 60 MG: 20 INJECTION, SOLUTION INTRAMUSCULAR; INTRAVENOUS at 10:44

## 2019-12-20 RX ADMIN — HYDROMORPHONE HYDROCHLORIDE 0.5 MG: 1 INJECTION, SOLUTION INTRAMUSCULAR; INTRAVENOUS; SUBCUTANEOUS at 12:13

## 2019-12-20 RX ADMIN — KETAMINE HYDROCHLORIDE 30 MG: 100 INJECTION, SOLUTION INTRAMUSCULAR; INTRAVENOUS at 10:44

## 2019-12-20 RX ADMIN — SODIUM CHLORIDE, POTASSIUM CHLORIDE, SODIUM LACTATE AND CALCIUM CHLORIDE: 600; 310; 30; 20 INJECTION, SOLUTION INTRAVENOUS at 09:09

## 2019-12-20 ASSESSMENT — PULMONARY FUNCTION TESTS
PIF_VALUE: 7
PIF_VALUE: 22
PIF_VALUE: 2
PIF_VALUE: 1
PIF_VALUE: 1
PIF_VALUE: 23
PIF_VALUE: 20
PIF_VALUE: 21
PIF_VALUE: 20
PIF_VALUE: 16
PIF_VALUE: 15
PIF_VALUE: 23
PIF_VALUE: 0
PIF_VALUE: 22
PIF_VALUE: 21
PIF_VALUE: 20
PIF_VALUE: 2
PIF_VALUE: 1
PIF_VALUE: 15
PIF_VALUE: 2
PIF_VALUE: 20
PIF_VALUE: 1
PIF_VALUE: 24
PIF_VALUE: 23
PIF_VALUE: 26
PIF_VALUE: 2
PIF_VALUE: 21
PIF_VALUE: 9
PIF_VALUE: 6
PIF_VALUE: 0
PIF_VALUE: 21
PIF_VALUE: 1
PIF_VALUE: 21
PIF_VALUE: 7
PIF_VALUE: 21
PIF_VALUE: 0
PIF_VALUE: 20
PIF_VALUE: 7
PIF_VALUE: 22
PIF_VALUE: 22
PIF_VALUE: 1
PIF_VALUE: 21
PIF_VALUE: 22
PIF_VALUE: 6
PIF_VALUE: 2
PIF_VALUE: 21
PIF_VALUE: 1
PIF_VALUE: 2
PIF_VALUE: 9
PIF_VALUE: 2
PIF_VALUE: 21
PIF_VALUE: 21
PIF_VALUE: 6
PIF_VALUE: 20
PIF_VALUE: 26
PIF_VALUE: 20
PIF_VALUE: 10
PIF_VALUE: 20
PIF_VALUE: 18
PIF_VALUE: 21
PIF_VALUE: 9
PIF_VALUE: 22
PIF_VALUE: 22
PIF_VALUE: 8
PIF_VALUE: 21
PIF_VALUE: 23
PIF_VALUE: 19
PIF_VALUE: 20
PIF_VALUE: 15
PIF_VALUE: 21
PIF_VALUE: 22
PIF_VALUE: 5
PIF_VALUE: 21
PIF_VALUE: 21
PIF_VALUE: 15
PIF_VALUE: 21
PIF_VALUE: 22
PIF_VALUE: 20

## 2019-12-20 ASSESSMENT — PAIN SCALES - GENERAL
PAINLEVEL_OUTOF10: 6
PAINLEVEL_OUTOF10: 7
PAINLEVEL_OUTOF10: 4
PAINLEVEL_OUTOF10: 3
PAINLEVEL_OUTOF10: 8

## 2019-12-20 ASSESSMENT — PAIN DESCRIPTION - ORIENTATION: ORIENTATION: LOWER

## 2019-12-20 ASSESSMENT — PAIN DESCRIPTION - PROGRESSION: CLINICAL_PROGRESSION: GRADUALLY WORSENING

## 2019-12-20 ASSESSMENT — PAIN DESCRIPTION - PAIN TYPE: TYPE: SURGICAL PAIN

## 2019-12-20 ASSESSMENT — PAIN DESCRIPTION - DESCRIPTORS
DESCRIPTORS: SHARP
DESCRIPTORS: ACHING

## 2019-12-20 ASSESSMENT — PAIN DESCRIPTION - LOCATION: LOCATION: BACK

## 2019-12-20 ASSESSMENT — PAIN - FUNCTIONAL ASSESSMENT
PAIN_FUNCTIONAL_ASSESSMENT: PREVENTS OR INTERFERES SOME ACTIVE ACTIVITIES AND ADLS
PAIN_FUNCTIONAL_ASSESSMENT: 0-10

## 2019-12-20 ASSESSMENT — PAIN DESCRIPTION - ONSET: ONSET: ON-GOING

## 2019-12-23 ENCOUNTER — TELEPHONE (OUTPATIENT)
Dept: ORTHOPEDIC SURGERY | Age: 29
End: 2019-12-23

## 2019-12-23 RX ORDER — IBUPROFEN 600 MG/1
600 TABLET ORAL 3 TIMES DAILY PRN
Qty: 30 TABLET | Refills: 0 | Status: SHIPPED | OUTPATIENT
Start: 2019-12-23 | End: 2020-08-18

## 2020-01-02 ENCOUNTER — OFFICE VISIT (OUTPATIENT)
Dept: ORTHOPEDIC SURGERY | Age: 30
End: 2020-01-02

## 2020-01-02 VITALS — HEIGHT: 61 IN | WEIGHT: 132.06 LBS | BODY MASS INDEX: 24.93 KG/M2

## 2020-01-02 PROCEDURE — 99024 POSTOP FOLLOW-UP VISIT: CPT | Performed by: ORTHOPAEDIC SURGERY

## 2020-01-02 NOTE — PROGRESS NOTES
Ms. Nancy Guzman returns today 2 weeks s/p MLD left L4-5 and L5-S1. She reports marked improvement of her leg symptoms. Her incisions show no signs of infection. She has a normal gait and 5/5 strength of her ankle DFs/PFs, bilaterally. Her sensation is intact from L3 to S1 bilaterally. I cautioned her to avoid lifting more than 10 pounds, bending and impact type aerobic exercise for 8 week after surgery, then slowly increase her activity over the next month.

## 2020-08-18 ENCOUNTER — OFFICE VISIT (OUTPATIENT)
Dept: ORTHOPEDIC SURGERY | Age: 30
End: 2020-08-18
Payer: COMMERCIAL

## 2020-08-18 VITALS — RESPIRATION RATE: 12 BRPM | WEIGHT: 130 LBS | BODY MASS INDEX: 24.55 KG/M2 | HEIGHT: 61 IN

## 2020-08-18 PROCEDURE — G8420 CALC BMI NORM PARAMETERS: HCPCS | Performed by: PHYSICIAN ASSISTANT

## 2020-08-18 PROCEDURE — 4004F PT TOBACCO SCREEN RCVD TLK: CPT | Performed by: PHYSICIAN ASSISTANT

## 2020-08-18 PROCEDURE — G8427 DOCREV CUR MEDS BY ELIG CLIN: HCPCS | Performed by: PHYSICIAN ASSISTANT

## 2020-08-18 PROCEDURE — 99214 OFFICE O/P EST MOD 30 MIN: CPT | Performed by: PHYSICIAN ASSISTANT

## 2020-08-18 RX ORDER — ACETAMINOPHEN AND CODEINE PHOSPHATE 120; 12 MG/5ML; MG/5ML
0.35 SOLUTION ORAL DAILY
Status: ON HOLD | COMMUNITY
Start: 2020-08-11 | End: 2021-01-06 | Stop reason: ALTCHOICE

## 2020-08-18 RX ORDER — METHYLPREDNISOLONE 4 MG/1
TABLET ORAL
Qty: 1 KIT | Refills: 0 | Status: SHIPPED | OUTPATIENT
Start: 2020-08-18 | End: 2020-09-01

## 2020-08-18 NOTE — PROGRESS NOTES
Follow up: Vel  1990  [id number]         Chief Complaint   Patient presents with    Lower Back Pain     F/u LSP; /19. Sx w/ Dr. Joana Tidwell on 12/20/19    Leg Pain     F/u Left leg pain, extends into foot         HISTORY OF PRESENT ILLNESS:  Ms. Anish Escoto is a 31 y.o. female returns for a follow up visit for multiple medical problems. Her current presenting problems are   1. HNP (herniated nucleus pulposus), lumbar    2. Lumbar radiculopathy    3. Lumbar stenosis with neurogenic claudication    4. Spondylosis without myelopathy or radiculopathy, lumbar region    . As per information/history obtained from the PADT(patient assessment and documentation tool) - She complains of pain in the lower back with radiation to the upper leg Left, lower leg Left and feet Left She rates the pain 5/10 and describes it as sharp, aching, numbness. Pain is made worse by: sitting, bending, sleeping. She denies side effects from the current pain regimen. Patient reports that since the last follow up visit the physical functioning is worse, family/social relationships are worse, mood is worse and sleep patterns are worse, and that the overall functioning is worse. Patient denies neurological bowel or bladder. The patient presents today in follow-up with worsening lower back and left leg pain radiating from the low back down into the foot. The patient describes that about 2 weeks ago she was helping a friend lift a dog crate and a small dog jumped into her arms. The patient describes that she began to have more pain in her back and then the radiating left leg pain began. The patient had previous lumbar spine surgery where she had a microlumbar discectomy at the L5-S1 level on 12/20/2019 with Dr. Joana Tidwell. Her pain that she is experiencing over these past 2 weeks is the same pain that she was experiencing then. She is having difficulty walking with numbness, tingling.   The patient describes that sitting, bending, changing positions, and sleeping increase her pain. Walking will help relieve the pain as well as lying on her side. The patient describes that she can sit for 5 minutes, stand for 20 minutes, and walk for 20 minutes without a considerable amount of pain. The patient describes that she has tried a multitude of different things over the past 2 weeks including muscle relaxers, heating pad, a TENS unit, seeing a chiropractor, and the use of ibuprofen. She describes that the chiropractor did help the most of anything but this was at a very minimal amount. The patient has not had an MRI of the lumbar spine since after surgery. Associated signs and symptoms:   Neurogenic bowel or bladder symptoms:  no   Perceived weakness:  yes   Difficulty walking:  yes            Past medical, surgical, social and family history reviewed with the patient.     Past Medical History:   Past Medical History:   Diagnosis Date    Asthma     as a child    Bacterial vaginosis     Chlamydia     Mental disorder     depression as an adolescent    Sciatic pain       Past Surgical History:     Past Surgical History:   Procedure Laterality Date    EPIDURAL STEROID INJECTION Bilateral 6/4/2019    BILATERAL LUMBAR FOUR TRANSFORAMINAL EPIDURAL STEROID INJECTION SITE CONFIRMED BY FLUOROSCOPY performed by Yoshi Lehman MD at [address] N/A 12/20/2019    MICROLUMBAR DISCECTOMY L5-S1 performed by Yony Lim MD at [address] [facility] DX/THER SBST INTRLMNR CRV/THRC W/IMG GDN Left 8/28/2018    LEFT LUMBAR FIVE SACRAL ONE TRANSFORAMINAL EPIDURAL STEROID INJECTION SITE CONFIRMED BY FLUOROSOCOPY performed by Yoshi Lehman MD at [address] [facility]  October 2010     Current Medications:     Current Outpatient Medications:     norethindrone (MICRONOR) 0.35 MG tablet, Take 0.35 mg by mouth daily, Disp: , Rfl:     methylPREDNISolone (MEDROL, ROSE MARY,) 4 MG tablet, Take by mouth., Disp: 1 kit, Rfl: 0  Allergies:  Nickel and Percocet [oxycodone-acetaminophen]  Social History:    reports that she has been smoking. She has been smoking about 0.50 packs per day. She has never used smokeless tobacco. She reports that she does not drink alcohol or use drugs. Family History:   Family History   Problem Relation Age of Onset    High Blood Pressure Mother     Diabetes Maternal Grandmother     High Blood Pressure Maternal Grandmother     Diabetes Maternal Grandfather     Diabetes Paternal Grandmother     Diabetes Paternal Grandfather     Heart Disease Paternal Grandfather        REVIEW OF SYSTEMS:   CONSTITUTIONAL: Denies unexplained weight loss, fevers, chills or fatigue  NEUROLOGICAL: Denies unsteady gait or progressive weakness  MUSCULOSKELETAL: Denies joint swelling or redness  GI: Denies nausea, vomiting, diarrhea   : Denies bowel or bladder issues       PHYSICAL EXAM:    Vitals: Resp. rate 12, height 5' 1\" (1.549 m), weight 130 lb (59 kg), last menstrual period 08/15/2020, unknown if currently breastfeeding. GENERAL EXAM:  · General Apparence: Patient is adequately groomed with no evidence of malnutrition. · Psychiatric: Orientation: The patient is oriented to time, place and person. The patient's mood and affect are appropriate   · Vascular: Examination reveals no swelling and palpation reveals no tenderness in upper or lower extremities. Good capillary refill. · The lymphatic examination of the neck, axillae and groin reveals all areas to be without enlargement or induration  · Sensation is intact without deficit in the upper and lower extremities to light touch and pinprick  · Coordination of the upper and lower extremities are normal.  · RIGHT UPPER EXTREMITY:  Inspection/examination of the right upper extremity does not show any tenderness, deformity or injury. Range of motion is unremarkable and pain-free. There is no gross instability.   There are no rashes, ulcerations or lesions. Strength and tone are normal. No atrophy or abnormal movements are noted. · LEFT UPPER EXTREMITY: Inspection/examination of the left upper extremity does not show any tenderness, deformity or injury. Range of motion is unremarkable and pain-free. There is no gross instability. There are no rashes, ulcerations or lesions. Strength and tone are normal. No atrophy or abnormal movements are noted. LUMBAR/SACRAL EXAMINATION:  · Inspection: Local inspection shows no step-off or bruising. Lumbar alignment is normal. No instability is noted. · Palpation:   No evidence of tenderness at the midline. Lumbar paraspinal tenderness: Moderate to severe left L4-5 and L5-S1 tenderness. Bursal tenderness No tenderness bilaterally  There is no paraspinal spasm. · Range of Motion: very limited due to pain  · Strength:   Strength testing is 5/5 in all muscle groups tested right lower extremity, 5-/5 in all muscle groups tested left lower extremity. · Special Tests:   Straight leg raise positive left and negative right and crossed SLR positive right with left sided lower back symptoms. Wyane's testing is negative bilaterally. FADIR's testing is negative bilaterally. Bowstring test negative. Slump test positive left and negative right. · Skin: There are no rashes, ulcerations or lesions. · Reflexes: Reflexes are symmetrically 2+ at the patellar and ankle tendons. Clonus absent bilaterally at the feet. · Gait & station: antalgic on the left and no ataxia  · Additional Examinations:  · RIGHT LOWER EXTREMITY: Inspection/examination of the right lower extremity does not show any tenderness, deformity or injury. Range of motion is normal and pain-free. There is no gross instability. There are no rashes, ulcerations or lesions. Strength and tone are normal. No atrophy or abnormal movements are noted.   · LEFT LOWER EXTREMITY:  Inspection/examination of the left lower extremity does not show any tenderness, deformity or injury. Range of motion is normal and pain-free. There is no gross instability. There are no rashes, ulcerations or lesions. Strength and tone are normal. No atrophy or abnormal movements are noted. Diagnostic Testing:    MR Lumbar spine shows from 11/4/19:       FINDINGS:  No scoliosis.  No lumbar fracture.  Moderate to severe disc desiccation L4-5 and    especially L5-S1.  The conus is normal terminating at L1-2.         Findings at individual levels demonstrate:         T11-12, T12-L1, L1-2: Unremarkable.         L2-3: No neurocompression.         L3-4: No neurocompression.         L4-5:  Left paracentral and left recess disc protrusion is larger, compressing descending left    L5 nerve root in the recess.  Minor right foramen stenosis.         L5-S1: Substantial enlargement of disc herniation, now approximately 17 x 10 x 8mm disc    extrusion extending caudad in the left recess to central thecal sac and displacing the left    descending S1 nerve root.  Nominal abutment of both exiting L5 nerve roots.         No hydronephrosis.  No aortic aneurysm or retroperitoneal adenopathy.         Multiple right adnexal cysts are present, the largest 3cm.         CONCLUSION:    1. L5-S1 substantial enlargement of disc herniation, now approximately 17 x 10 x 8mm disc    extrusion extending caudad in the left recess to central thecal sac and displacing the left    descending S1 nerve root. Nominal abutment of both exiting L5 nerve roots. 2. L4-5 left paracentral and left recess disc protrusion is larger, compressing descending left     L5 nerve root in the recess. Minor right foramen stenosis.       Results for orders placed or performed during the hospital encounter of 08/21/16   Urine reflex to culture    Specimen: Urine, clean catch   Result Value Ref Range    Color, UA Yellow Straw/Yellow    Clarity, UA Clear Clear    Glucose, Ur Negative Negative mg/dL    Bilirubin Urine Negative Negative patient has failed a six week trial of a HEP program within the last 3 months. 4. Interventional:  After further imaging is obtained, interventional options will be reviewed and recommended. 5. Follow up:  1-2 weeks      Billy Marmolejo was instructed to call the office if her symptoms worsen or if new symptoms appear prior to the next scheduled visit. She is specifically instructed to contact the office between now & her scheduled appointment if she has concerns related to her condition or if she needs assistance in scheduling the above tests. She is welcome to call for an appointment sooner if she has any additional concerns or questions. BLOSSOM Tom, PA-C  Board Certified by the [employer] on Certification of Physician Assistants  [address] [facility]  Partner of [facility] ([facility])        This dictation was performed with a verbal recognition program [facility] ) and it was checked for errors. It is possible that there are still dictated errors within this office note. If so, please bring any errors to my attention for an addendum. All efforts were made to ensure that this office note is accurate.

## 2020-08-28 ENCOUNTER — TELEPHONE (OUTPATIENT)
Dept: ORTHOPEDIC SURGERY | Age: 30
End: 2020-08-28

## 2020-08-28 NOTE — TELEPHONE ENCOUNTER
Getachew Fine, ATC               DENIED     FOR PEER TO PEER CAN CALL [phone] WITH 5 DAYS EXT. Brayan Ellis # [phone]        Per denial scanned into MEDIA, MRI is being denied due to the following criteria:     -\"your muscles and nerves are ok (no asymmetric weakness, asymmetric reflexes or dermatomal sensory changes on physical exam)\"  -lack of 6 weeks of PT    Patient completed #4 PT visits from 10/3/2019-10/14/2019. Please advise if P2P can be completed, and ATC will schedule accordingly. Patient's LOV 8/18/2020 with RADHA.

## 2020-08-28 NOTE — TELEPHONE ENCOUNTER
CELIA/LOS TURNER explaining denial of MRI and that PT will need to be completed for 6 weeks. Advised that referral will be placed in her chart, provided # for patient to schedule at her convenience. Patient was instructed to contact the office with further questions or concerns.

## 2020-08-31 ENCOUNTER — HOSPITAL ENCOUNTER (EMERGENCY)
Age: 30
Discharge: HOME | End: 2020-08-31
Payer: COMMERCIAL

## 2020-08-31 VITALS
HEART RATE: 55 BPM | SYSTOLIC BLOOD PRESSURE: 122 MMHG | RESPIRATION RATE: 16 BRPM | OXYGEN SATURATION: 100 % | TEMPERATURE: 98.1 F | HEIGHT: 61 IN | DIASTOLIC BLOOD PRESSURE: 74 MMHG | WEIGHT: 130 LBS | BODY MASS INDEX: 24.55 KG/M2

## 2020-08-31 PROCEDURE — 96372 THER/PROPH/DIAG INJ SC/IM: CPT

## 2020-08-31 PROCEDURE — 99282 EMERGENCY DEPT VISIT SF MDM: CPT

## 2020-08-31 PROCEDURE — 6360000002 HC RX W HCPCS: Performed by: NURSE PRACTITIONER

## 2020-08-31 PROCEDURE — 6370000000 HC RX 637 (ALT 250 FOR IP): Performed by: NURSE PRACTITIONER

## 2020-08-31 RX ORDER — KETOROLAC TROMETHAMINE 30 MG/ML
15 INJECTION, SOLUTION INTRAMUSCULAR; INTRAVENOUS ONCE
Status: COMPLETED | OUTPATIENT
Start: 2020-08-31 | End: 2020-08-31

## 2020-08-31 RX ORDER — PREDNISONE 20 MG/1
TABLET ORAL
Qty: 18 TABLET | Refills: 0 | Status: SHIPPED | OUTPATIENT
Start: 2020-08-31 | End: 2020-09-29 | Stop reason: ALTCHOICE

## 2020-08-31 RX ORDER — PREDNISONE 20 MG/1
60 TABLET ORAL ONCE
Status: COMPLETED | OUTPATIENT
Start: 2020-08-31 | End: 2020-08-31

## 2020-08-31 RX ORDER — METHOCARBAMOL 500 MG/1
500 TABLET, FILM COATED ORAL 3 TIMES DAILY
Qty: 15 TABLET | Refills: 0 | Status: SHIPPED | OUTPATIENT
Start: 2020-08-31 | End: 2020-09-05

## 2020-08-31 RX ORDER — ORPHENADRINE CITRATE 30 MG/ML
60 INJECTION INTRAMUSCULAR; INTRAVENOUS ONCE
Status: COMPLETED | OUTPATIENT
Start: 2020-08-31 | End: 2020-08-31

## 2020-08-31 RX ADMIN — KETOROLAC TROMETHAMINE 15 MG: 30 INJECTION, SOLUTION INTRAMUSCULAR at 21:47

## 2020-08-31 RX ADMIN — ORPHENADRINE CITRATE 60 MG: 30 INJECTION INTRAMUSCULAR; INTRAVENOUS at 21:47

## 2020-08-31 RX ADMIN — PREDNISONE 60 MG: 20 TABLET ORAL at 21:47

## 2020-08-31 ASSESSMENT — ENCOUNTER SYMPTOMS
BACK PAIN: 1
SHORTNESS OF BREATH: 0
ABDOMINAL PAIN: 0
RHINORRHEA: 0
COLOR CHANGE: 0
SORE THROAT: 0

## 2020-08-31 ASSESSMENT — PAIN DESCRIPTION - LOCATION: LOCATION: BACK

## 2020-08-31 ASSESSMENT — PAIN DESCRIPTION - ORIENTATION: ORIENTATION: LOWER

## 2020-08-31 ASSESSMENT — PAIN SCALES - GENERAL
PAINLEVEL_OUTOF10: 9
PAINLEVEL_OUTOF10: 8

## 2020-08-31 ASSESSMENT — PAIN DESCRIPTION - PAIN TYPE: TYPE: CHRONIC PAIN

## 2020-09-01 ENCOUNTER — HOSPITAL ENCOUNTER (OUTPATIENT)
Dept: VASCULAR LAB | Age: 30
Discharge: HOME | End: 2020-09-01
Payer: COMMERCIAL

## 2020-09-01 ENCOUNTER — OFFICE VISIT (OUTPATIENT)
Dept: ORTHOPEDIC SURGERY | Age: 30
End: 2020-09-01
Payer: COMMERCIAL

## 2020-09-01 VITALS — BODY MASS INDEX: 24.55 KG/M2 | RESPIRATION RATE: 12 BRPM | WEIGHT: 130 LBS | TEMPERATURE: 97.8 F | HEIGHT: 61 IN

## 2020-09-01 PROCEDURE — 99213 OFFICE O/P EST LOW 20 MIN: CPT | Performed by: PHYSICIAN ASSISTANT

## 2020-09-01 PROCEDURE — 4004F PT TOBACCO SCREEN RCVD TLK: CPT | Performed by: PHYSICIAN ASSISTANT

## 2020-09-01 PROCEDURE — G8420 CALC BMI NORM PARAMETERS: HCPCS | Performed by: PHYSICIAN ASSISTANT

## 2020-09-01 PROCEDURE — G8427 DOCREV CUR MEDS BY ELIG CLIN: HCPCS | Performed by: PHYSICIAN ASSISTANT

## 2020-09-01 PROCEDURE — 93971 EXTREMITY STUDY: CPT

## 2020-09-01 PROCEDURE — 93926 LOWER EXTREMITY STUDY: CPT

## 2020-09-01 NOTE — ED PROVIDER NOTES
Evaluated by Advanced Practice Provider          [facility]  EMERGENCY DEPARTMENT ENCOUNTER        Pt Name: Monika Burciaga  MRN: [id number]  Armstrongfverónica 1990  Dateof evaluation: 8/31/2020  Provider: ROBE Abdi - ANA  PCP: Renee Herrera MD  ED Attending: No att. providers found    [address] [facility]       Chief Complaint   Patient presents with    Back Pain     c/o lower back pain x 1 month. Lower back surgery in December. pain radiates down left leg       HISTORY OF PRESENTILLNESS   (Location/Symptom, Timing/Onset, Context/Setting, Quality, Duration, Modifying Factors, Severity)  Note limiting factors. Monika Burciaga is a 31 y.o. female for lower back pain. Onset was 1 month. Context includes pt states she has had lower left back pain for the past month. Patient has a history of chronic back pain and follows with orthopedics. Patient reports that she had a discectomy done in December 2019. She states it radiates down her left leg. She denies any fevers. Alleviating factors include nothing. Aggravating factors include movement and touch. Pain is 9/10. Nothing has been used for pain today. Nursing Notes were all reviewed and agreed with or any disagreements were addressed  in the HPI. REVIEW OF SYSTEMS    (2-9 systems for level 4, 10 or more for level 5)     Review of Systems   Constitutional: Negative for fever. HENT: Negative for congestion, rhinorrhea and sore throat. Respiratory: Negative for shortness of breath. Cardiovascular: Negative for chest pain. Gastrointestinal: Negative for abdominal pain. Genitourinary: Negative for decreased urine volume and difficulty urinating. Musculoskeletal: Positive for back pain. Negative for arthralgias and myalgias. Skin: Negative for color change and rash. Neurological: Negative for dizziness and light-headedness. Psychiatric/Behavioral: Negative for agitation.    All other systems reviewed and are negative. Positives and Pertinent negatives as per HPI. Except as noted above in the ROS, all other systems were reviewed and negative. PAST MEDICAL HISTORY     Past Medical History:   Diagnosis Date    Asthma     as a child    Bacterial vaginosis     Chlamydia     Mental disorder     depression as an adolescent    Sciatic pain          SURGICAL HISTORY       Past Surgical History:   Procedure Laterality Date    EPIDURAL STEROID INJECTION Bilateral 6/4/2019    BILATERAL LUMBAR FOUR TRANSFORAMINAL EPIDURAL STEROID INJECTION SITE CONFIRMED BY FLUOROSCOPY performed by Mike Heart MD at [address] N/A 12/20/2019    MICROLUMBAR DISCECTOMY L5-S1 performed by Siobhan Yusuf MD at [address] [facility] DX/THER SBST INTRLMNR CRV/THRC W/IMG GDN Left 8/28/2018    LEFT LUMBAR FIVE SACRAL ONE TRANSFORAMINAL EPIDURAL STEROID INJECTION SITE CONFIRMED BY FLUOROSOCOPY performed by Mike Heart MD at [address] EXTRACTION  October 2010         CURRENT MEDICATIONS       Previous Medications    METHYLPREDNISOLONE (MEDROL, ROSE MARY,) 4 MG TABLET    Take by mouth.     NORETHINDRONE (MICRONOR) 0.35 MG TABLET    Take 0.35 mg by mouth daily         ALLERGIES     Nickel and Percocet [oxycodone-acetaminophen]    FAMILY HISTORY       Family History   Problem Relation Age of Onset    High Blood Pressure Mother     Diabetes Maternal Grandmother     High Blood Pressure Maternal Grandmother     Diabetes Maternal Grandfather     Diabetes Paternal Grandmother     Diabetes Paternal Grandfather     Heart Disease Paternal Grandfather           SOCIAL HISTORY       Social History     Socioeconomic History    Marital status: Single     Spouse name: None    Number of children: None    Years of education: None    Highest education level: None   Occupational History    None   Social Needs    Financial resource strain: None    Food insecurity     Worry: None     Inability: None    Transportation needs     Medical: None     Non-medical: None   Tobacco Use    Smoking status: Current Every Day Smoker     Packs/day: 0.50    Smokeless tobacco: Never Used   Substance and Sexual Activity    Alcohol use: No    Drug use: No    Sexual activity: Yes     Partners: Male   Lifestyle    Physical activity     Days per week: None     Minutes per session: None    Stress: None   Relationships    Social connections     Talks on phone: None     Gets together: None     Attends Synagogue service: None     Active member of club or organization: None     Attends meetings of clubs or organizations: None     Relationship status: None    Intimate partner violence     Fear of current or ex partner: None     Emotionally abused: None     Physically abused: None     Forced sexual activity: None   Other Topics Concern    None   Social History Narrative    None       SCREENINGS             PHYSICAL EXAM  (up to 7 for level 4, 8 or more for level 5)     ED Triage Vitals [08/31/20 2053]   BP Temp Temp Source Pulse Resp SpO2 Height Weight   122/74 98.1 °F (36.7 °C) Oral 55 16 100 % 5' 1\" (1.549 m) 130 lb (59 kg)       Physical Exam  Constitutional:       Appearance: She is well-developed. HENT:      Head: Normocephalic and atraumatic. Neck:      Musculoskeletal: Normal range of motion. Cardiovascular:      Rate and Rhythm: Normal rate. Pulmonary:      Effort: Pulmonary effort is normal. No respiratory distress. Breath sounds: Normal breath sounds. Abdominal:      General: There is no distension. Palpations: Abdomen is soft. Tenderness: There is no abdominal tenderness. Musculoskeletal:         General: Tenderness present. No swelling, deformity or signs of injury. Comments: Decreased range of motion due to pain elicited with movement back. Tenderness with palpation to the paraspinal lumbar and left SI joint region. Patient has left sided radiculopathy.   Patient denies any caudal anesthesia. Sensation strength and pulses intact to lower extremities. Deep tendon reflexes intact. Straight leg raise positive    There is no calf swelling warmth or redness noted. Skin:     General: Skin is warm and dry. Neurological:      Mental Status: She is alert and oriented to person, place, and time. DIAGNOSTIC RESULTS   LABS:    Labs Reviewed - No data to display    All other labs werewithin normal range or not returned as of this dictation. EKG: All EKG's are interpreted by the Emergency Department Physician who either signs or Co-signs this chart in the absence of a cardiologist.  Please see their note for interpretation of EKG. RADIOLOGY:           Interpretation per the Radiologist below, if available at the time of this note:    VL Extremity Venous Left    (Results Pending)     No results found. PROCEDURES   Unless otherwise noted below, none     Procedures     CRITICAL CARE TIME   N/A    CONSULTS:  None      EMERGENCYDEPARTMENT COURSE and DIFFERENTIAL DIAGNOSIS/MDM:   Vitals:    Vitals:    08/31/20 2053   BP: 122/74   Pulse: 55   Resp: 16   Temp: 98.1 °F (36.7 °C)   TempSrc: Oral   SpO2: 100%   Weight: 130 lb (59 kg)   Height: 5' 1\" (1.549 m)       Patient was given the following medications:  Medications   predniSONE (DELTASONE) tablet 60 mg (60 mg Oral Given 8/31/20 2147)   orphenadrine (NORFLEX) injection 60 mg (60 mg Intramuscular Given 8/31/20 2147)   ketorolac (TORADOL) injection 15 mg (15 mg Intramuscular Given 8/31/20 2147)       Patient was seen and evaluated by myself. Patient here for complaints of left lower back pain that radiates to her left leg. Patient reports he is a chronic history of back pain. Patient states that she has had back pain for the last month and started having pain radiate down her left leg about 3 to 4 weeks ago as well. Patient states that she had a discectomy done in December 2019. Patient denies any fever.   Patient states that (ROBAXIN) 500 MG TABLET    Take 1 tablet by mouth 3 times daily for 5 days    PREDNISONE (DELTASONE) 20 MG TABLET    Take 3 tabs orally daily for 3 days, then take 2 tabs orally for 3 days then take 1 tab orally for 3 days.        DISCONTINUED MEDICATIONS:  Discontinued Medications    No medications on file              (Please note that portions of this note were completed with a voice recognition program.  Efforts were made to edit the dictations but occasionally words are mis-transcribed.)    ROBE Bello CNP (electronically signed)         ROBE Bello CNP  08/31/20 2140       ROBE Bello CNP  08/31/20 2150

## 2020-09-01 NOTE — PROGRESS NOTES
Follow up: NimeshGroesbeck Pako Richardson  1990  [id number]         Chief Complaint   Patient presents with    Lower Back Pain     F/u LSP; ER visit on 8/31/20         HISTORY OF PRESENT ILLNESS:  Ms. Tamir Begum is a 31 y.o. female returns for a follow up visit for multiple medical problems. Her current presenting problems are   1. HNP (herniated nucleus pulposus), lumbar    2. Lumbar radiculopathy    3. Lumbar stenosis with neurogenic claudication    4. Spondylosis without myelopathy or radiculopathy, lumbar region    . As per information/history obtained from the PADT(patient assessment and documentation tool) - She complains of pain in the lower back with radiation to the upper leg Left, lower leg Left and feet Left She rates the pain 8/10 and describes it as aching. Pain is made worse by: sitting. She denies side effects from the current pain regimen. Patient reports that since the last follow up visit the physical functioning is worse, family/social relationships are worse, mood is worse and sleep patterns are worse, and that the overall functioning is worse. Patient denies neurological bowel or bladder. The patient presents today in follow up for the lower back and left leg pain that radiates to the foot. The MRI of the lumbar spine was denied at this time as she has not had recent physical therapy for a total of 6 weeks. The patient describes that her pain is worse with sitting specifically and radiates from lower back down the left leg and into the foot. The patient describes that she did take the Medrol Dosepak that was provided to her at her last office visit on 8/18/2020 which she describes did help with her pain however after she completed the medication her pain returned. She did present to the emergency department on 8/31/2020 and was provided with more steroids as well as a muscle relaxer. The patient improves with walking and lying down.   She describes that she can sit, stand for 5 minutes and walk for 20 minutes without pain. Associated signs and symptoms:   Neurogenic bowel or bladder symptoms:  no   Perceived weakness:  no   Difficulty walking:  no            Past medical, surgical, social and family history reviewed with the patient. Past Medical History:   Past Medical History:   Diagnosis Date    Asthma     as a child    Bacterial vaginosis     Chlamydia     Mental disorder     depression as an adolescent    Sciatic pain       Past Surgical History:     Past Surgical History:   Procedure Laterality Date    EPIDURAL STEROID INJECTION Bilateral 6/4/2019    BILATERAL LUMBAR FOUR TRANSFORAMINAL EPIDURAL STEROID INJECTION SITE CONFIRMED BY FLUOROSCOPY performed by Sima Simmons MD at [address] N/A 12/20/2019    MICROLUMBAR DISCECTOMY L5-S1 performed by Salvador Linton MD at [address] [facility] DX/THER SBST INTRLMNR CRV/THRC W/IMG GDN Left 8/28/2018    LEFT LUMBAR FIVE SACRAL ONE TRANSFORAMINAL EPIDURAL STEROID INJECTION SITE CONFIRMED BY FLUOROSOCOPY performed by Sima Simmons MD at [address] EXTRACTION  October 2010     Current Medications:     Current Outpatient Medications:     predniSONE (DELTASONE) 20 MG tablet, Take 3 tabs orally daily for 3 days, then take 2 tabs orally for 3 days then take 1 tab orally for 3 days. , Disp: 18 tablet, Rfl: 0    methocarbamol (ROBAXIN) 500 MG tablet, Take 1 tablet by mouth 3 times daily for 5 days, Disp: 15 tablet, Rfl: 0    norethindrone (MICRONOR) 0.35 MG tablet, Take 0.35 mg by mouth daily, Disp: , Rfl:   Allergies:  Nickel and Percocet [oxycodone-acetaminophen]  Social History:    reports that she has been smoking. She has been smoking about 0.50 packs per day. She has never used smokeless tobacco. She reports that she does not drink alcohol or use drugs.   Family History:   Family History   Problem Relation Age of Onset    High Blood Pressure Mother     Diabetes Maternal Grandmother or lesions. Strength and tone are normal. No atrophy or abnormal movements are noted. LUMBAR/SACRAL EXAMINATION:  · Inspection: Local inspection shows no step-off or bruising. Lumbar alignment is normal. No instability is noted. · Palpation:   No evidence of tenderness at the midline. Lumbar paraspinal tenderness: Mild L4/5 and L5/S1 tenderness with left greater than right SI joint tenderness. Bursal tenderness No tenderness bilaterally  There is no paraspinal spasm. · Range of Motion: limited by 50% in all planes due to pain  · Strength:   Strength testing is 5/5 in all muscle groups tested. · Special Tests:   Straight leg raise positive left and negative right and crossed SLR negative. Wayne's testing is negative bilaterally. FADIR's testing is negative bilaterally. Bowstring test negative. Slump test negative. · Skin: There are no rashes, ulcerations or lesions. · Reflexes: Reflexes are symmetrically 2+ at the patellar and ankle tendons. Clonus absent bilaterally at the feet. · Gait & station: antalgic on the left and no ataxia  · Additional Examinations:  · RIGHT LOWER EXTREMITY: Inspection/examination of the right lower extremity does not show any tenderness, deformity or injury. Range of motion is normal and pain-free. There is no gross instability. There are no rashes, ulcerations or lesions. Strength and tone are normal. No atrophy or abnormal movements are noted. · LEFT LOWER EXTREMITY:  Inspection/examination of the left lower extremity does not show any tenderness, deformity or injury. Range of motion is normal and pain-free. There is no gross instability. There are no rashes, ulcerations or lesions. Strength and tone are normal. No atrophy or abnormal movements are noted. Diagnostic Testing:    No new diagnostics.    Results for orders placed or performed during the hospital encounter of 08/21/16   Urine reflex to culture    Specimen: Urine, clean catch   Result Value Ref Range hamstring flexibility, modalities as indicated for 6-8 visits over the next 4-6 weeks. 3. Further studies: The patient will complete a STAT doppler to rule out a blood clot. 4. Interventional:  No further interventions at this time. The patient will be provided FMLA for 3-4 visits per month for flare ups, office visits, and injections for the next 6 months. 5. Follow up:  4-6 weeks      Mireya Harmon was instructed to call the office if her symptoms worsen or if new symptoms appear prior to the next scheduled visit. She is specifically instructed to contact the office between now & her scheduled appointment if she has concerns related to her condition or if she needs assistance in scheduling the above tests. She is welcome to call for an appointment sooner if she has any additional concerns or questions. BLOSSOM Solares, PA-C  Board Certified by the [employer] on Certification of Physician Assistants  [address] [facility]  Partner of [facility] ([facility])        This dictation was performed with a verbal recognition program [facility]) and it was checked for errors. It is possible that there are still dictated errors within this office note. If so, please bring any errors to my attention for an addendum. All efforts were made to ensure that this office note is accurate.

## 2020-09-02 ENCOUNTER — HOSPITAL ENCOUNTER (OUTPATIENT)
Dept: PHYSICAL THERAPY | Age: 30
Setting detail: THERAPIES SERIES
Discharge: HOME | End: 2020-09-02
Payer: COMMERCIAL

## 2020-09-02 ENCOUNTER — TELEPHONE (OUTPATIENT)
Dept: ORTHOPEDIC SURGERY | Age: 30
End: 2020-09-02

## 2020-09-02 PROCEDURE — 97110 THERAPEUTIC EXERCISES: CPT

## 2020-09-02 PROCEDURE — 97116 GAIT TRAINING THERAPY: CPT

## 2020-09-02 PROCEDURE — 97161 PT EVAL LOW COMPLEX 20 MIN: CPT

## 2020-09-02 NOTE — PROGRESS NOTES
The following patient has been evaluated for physical therapy services. In order for therapy to continue, Medicare requires physician review of the treatment plan. Please review the attached evaluation and/or summary of the patient's plan of care, and verify that you agree by signing below and sending it back to our office. Thank you for this referral.    Physician signature_______________________ Date________________    Fax to:   [facility] [phone]           LOWER QUARTER PHYSICAL THERAPY EVALUATION  And Treatment Note      Evaluation Date: 9/2/2020       Patient Name: Jed Guerra     YOB: 1990      Medical Diagnosis/ ICD 10:  HNP, LUMBAR M51.26, lumbar radiculopathy M54.16, lumbar stenosis with neurogenic claudication M48.062, spondylosis without myelopathy or radiculopathy, lumbar region M47.816    Treatment Diagnosis:  LBP with radiculopathy LLE, abnormality of gait    Onset Date:  7/15/20    Referral Date:  8/28/20     Referring Physician:  Dr. Joaquina Diez     Visits Allowed/Insurance/Certification Information:  [employer], 30 visits, no estim, no ionto, no DN      Restrictions/Precautions:  Hx of lumbar discectomy and laminectomy L4-5 and L5-S1 in 12/2019     Health History reviewed with pt:   [x]Yes   []No          SUBJECTIVE FINDINGS         History of Present Illness:      Pt presents with C/o severe back pain for over a month. Pt states she was cleaning house when a puppy jumped into her arms when she was bent over and had immediate pain. Pt returned to [facility], saw Dr. Vicente Brady, given prednisone pack which helped for a day or two, ordered PT and MRI but it was denied. Pt states leg pain worsening since then, thought it may be blood clot, went to ED, no clot, given steroids and muscle relaxer. MRI 11/2019 prior to surgery:   CONCLUSION:    1.  L5-S1 substantial enlargement of disc herniation, now approximately 17 x 10 x below  ROM  Deficits         *denotes pain AROM  (% Decreased) COMMENTS   Flexion 75    Extension WFL    Sidebending Left 25    Sidebending Right 25    Rotation Left  25 [x]Seated  []Standing       Rotation Right 25 [x]Seated  []Standing         Special Tests- Standing   (C)= Jeremiah's Criteria: 3/4 Positive tests or (+) Fortins sign with two additional (+) tests  Special Test Abnormal Findings   Jennifer's Sign                (C)                  [x]Neg   []Pos R   []Pos L      Standing Landmarks []Iliac Crests Equal   []R High  []L High  []PSIS Equal   []R High  []L High  []ASIS Equal   []R High  []L High     [x]Difficult to assess due to decreased stance LLE    Standing Flexion Test  (C) []Neg   []Pos R   [x]Pos L  But limited flexion    Sacral Sulci Test  [x]Neg   []Pos R   []Pos L          Special Tests- seated     Special Test Abnormal Findings   Seated pelvic landmarks (C) [x]Iliac Crests Equal   []R High   []L High  [x]PSIS Equal   []R High  []L High  []Difficult to assess due to body habitus       Reflexes     [x]All reflexes WNL (2+) except as marked below  Abnormal Reflex Findings Left Right Comments   Olegario's Reflex      Biceps (C5,6)      Brachioradialis (C6)      Triceps (C7)      Quadriceps (L3,4)     []Pendular x 3 R  []Pendular x 3 L   Achilles (S1,2)      Ankle clonus , # of beats 1-2 1    Babinski's reflex  NT NT        Sensation  NT      Range of Motion/Strength Testing-Myotomes  Unable to fully test except what is marked below       Range Tested MMT/ Resisted PROM AROM Comments   *denotes pain Left Right Left Right Left Right    Hip Flexion  (L1-2)          Hip Extension          Hip Abduction  (L5)          Hip Adduction  (L3)          Hip IR          Hip ER          Knee Flexion  (L5,S1)          Knee Extension  (L3,4)          Ankle Dorsiflex  (L4) 3-   pain 5        Ankle Plantarflex  (S1,2)          Ankle Inversion          Ankle Eversion          Great Toe Ext  (L5) 3+  Pain  Pt starts crying 5          [x] Not Tested  Trunk Strength     Trunk Extensors     Gluteals     Abdominals         Palpation     Patient reported tenderness with palpation:  []Yes   [x]No         Functional Outcome Measure:       Measure Used: Mod oswestry  Score: 38/50        ASSESSMENT   Pt presents with problems below. Pt with significant history of large disc herniations and surgery less than 1 year ago. Pt was doing well until this recent episode a month ago. Pt exhibits significant pain with radiculopathy and antalgic movement patterns. LE reflexes are WNL. Myotomes difficult to assess today due to pain levels. Pt should do well with PT to improve functional status and decrease pain. Problems        [x]Decreased trunk ROM  [x]Decreased LE ROM  [x]Decreased strength  [x]Positive neurological findings  []Decreased joint mobility  [x]Increased pain  []Decreased flexibility  [x]Abnormality of gait  []Decreased balance  [x]Poor posture/alignment  [x]Decreased functional status     Rehabilitation Potential:  Good for goals listed below.     Strengths for achieving goals include:   [x]Pt motivated   [x]PLOF   []Family support   Limitations for achieving goals include: []None  [x]Pain  []Severity of condition     []Cognitive   []Lack of family support   []Lack of resources     []Other:         Prognosis:   [x]Good   []Fair   []Poor    GOALS    Short Term Goals:   3  weeks MET Not Met Long Term Goals:   6  weeks MET Not Met   Establish HEP [] [] Pt independent with HEP [] []   Pain 5 /10 or less [] [] Pain  3/10 or less [] []   Increase identified strength deficits 1/3 grade  [] [] Increase strength to 4+/5 or greater  [] []   Achieve neutral alignment of pelvis / Resolve SI dysfunction if applicable, needs to further assessed [] [] Maintain neutral alignment / Normal SI mobility x 2 consecutive visits if applicable [] []   Gait WNL  [] [] Return to all ADLs/prior level of function [] []   Decrease subjective complaint of radicular pain LLE by 50% [] [] No radicular pain into LLE  [] []     PLAN OF CARE     To see patient  1-2 x/week for  6 weeks for the following treatment interventions:    [x]Therapeutic Exercise  [x]Modalities of Choice:   [x]Heat   [x]Cold   [x]US   []Estim   []Ionto  [x]Mechanical Traction   [x]Manual Therapy  [x]Neuromuscular Re-Education  [x]Gait Training   [x]Therapeutic Activity  []Other         Treatment Performed this visit :   40 minutes (30 min ex, 10 min gait)     Exercise: Pt inst in role of PT, prognosis, plan of care, use of CP/HP, activity modification, and benefits of therapy.  HEP has been established, see below, pt given handout(s) of new exercises.  Positional distraction SL R over roll, given handout    Reviewed previous MRI/surgery report and discussed with pt   Discussed taking break mid morning and mid afternoon as well as her lunch to break up sitting while working at home.  Gait:  Gait training with SPC, discussed proper technique, where to buy, benefits, how to adjust      Pt response to Tx:   Limited by pain. Pain improved while in positional distraction and SPC helpful with gait     Plan for Next Visit:     Assess effectiveness of positional distraction   Trial manual lumbar traction with possibility of progressing to mechanical if helpful   Assess pelvis when able and treat as needed   Assess LE myotomes/strength when able   Modalities as needed/pain modulation   Progress HEP as able        Thank you for the referral of this patient.       Timed Code Treatment Minutes:   40  minutes   Total Treatment Time:   58 minutes    Plan of care sent to provider:      []Faxed  [x]Co-signature    (attempts: 1[x] 2 []3[])         Medicare Cap total YTD:   [x]N/A  Workers Comp Time Stamp  [x]N/A   Time In:   Time Out:    Katharine Show PT, OMT-C, [id number]

## 2020-09-09 ENCOUNTER — HOSPITAL ENCOUNTER (OUTPATIENT)
Dept: PHYSICAL THERAPY | Age: 30
Setting detail: THERAPIES SERIES
Discharge: HOME | End: 2020-09-09
Payer: COMMERCIAL

## 2020-09-09 PROCEDURE — 97110 THERAPEUTIC EXERCISES: CPT

## 2020-09-09 PROCEDURE — 97140 MANUAL THERAPY 1/> REGIONS: CPT

## 2020-09-09 NOTE — FLOWSHEET NOTE
Dr. Johnathan Norwood,    Pt has had 2 visits of PT. Today pt states her pain is much less, however, her numbness LLE is worse and constant. Pt states she is unable to actively lift L great toe today (trace movement noted by PT) whereas on 9/2/20 pt had 3+ strength in great toe. Ankle DF also weak, 3-/5. Reflexes still 2+ B knee and ankle. Pt unable to perform heel walking and toe walking in dept today. At [facility], pt did complain of initiating urination but no other bowel or bladder signs. Will plan to cont PT and will closely monitor pt's symptoms, but PT wanted to make you aware of these changes in case you needed to reassess pt. Pt was inst to call your dept if condition worsens.      Thank you for this referral,   Cindi Cook, PT, T-C, [id number]      Outpatient Physical Therapy     [x] Daily Treatment Note   [] Progress Note   [] Discharge Note    Date:  9/9/2020    Patient Name:  Will Chiang         YOB: 1990    Medical Diagnosis/ ICD 10:  HNP, LUMBAR M51.26, lumbar radiculopathy M54.16, lumbar stenosis with neurogenic claudication M48.062, spondylosis without myelopathy or radiculopathy, lumbar region M47.816     Treatment Diagnosis:  LBP with radiculopathy LLE, abnormality of gait     Onset Date:  7/15/20     Referral Date:  8/28/20      Referring Physician:  Dr. Khushboo Lucero     Visits Allowed/Insurance/Certification Information:  [employer], 30 visits, no estim, no ionto, no DN      Restrictions/Precautions:  Hx of lumbar discectomy and laminectomy L4-5 and L5-S1 in 12/2019     Plan of care sent to provider:      []Faxed  [x]Co-signature    (attempts: 1[x] 2 []3[])         Plan of care signed:      [x]Yes date: 9/3/20           []No      Progress Note covers period from (if applicable):    [x]NA    []From          To           Next Progress Note due:   9/23/20    Visit# / total visits:  2/12    Plan for Next Visit:    · Assess effectiveness of manual traction and progress to mechanical traction if indicated  · Assess pelvis when able and treat as needed  · Monitor LE strength/myotomes L LE  · Modalities as needed/pain modulation  · Progress HEP as able    Subjective:    Pt states she didn't do anything all day Sunday. States pain started to decrease for the past few days  Pt states she did positional distraction and not sure how much it helps. States numbness is constant now, from knee down to top of foot, bottom of foot, toes. States she only has one more dose of steroid to finish. Pain level: Pain currently 2/10 today, pain no higher than 3/10 past few days. AT EVAL: Patient reports pain is  8/10 pain at present and  9/10 pain at its worst.  Pt presents with pain L LS and radiates down post LE to ankle. Complains intermittent numbness and tingling L LE, currently ant LE       Objective:       Exercises:    Exercises in bold performed in department today. Items not bolded are carried forward from prior visits for continuity of the record. Exercise/Equipment Resistance/Repetitions HEP Other comments       []      Positional distraction SL R over roll  Pt inst to do this for 10-30 min at a time, 3x per day or more as needed, and to monitor leg symptoms to see if they improve.     [x] Pt to continue     Knee rocks  Pain free range, 3x10 [x]      GS in HL Pt to work up to 3x10 [x]      TA with exhale in HL Pt to work up to 3x10 [x]        []        []        []        []          []         []        []        []        []        []        []        []        []      Therapeutic Exercise/Home Exercise Program:   20 minutes  Due to complaint of increased numbness and decreased pain:  Reassessed LE reflexes: still 2+ at knee and ankle  LE sensation: tender lat calf, dec post calf, dorsum of foot, great toe, 5th digit  Myotomes  Hip flexion B 4+  Knee extension R 5/5 L 4+/5 complains of pulling in calf when leg is straight and pain around ankle  Knee flexion 4+/5 B  Ankle DF R 5/5, L 3-/5  Great toe R 5/5, L pt states unable to move it now, trace active movement    Pt inst to call MD office if weakness worsens in L LE.      HEP has been established, See above, pt given handout(s) of new exercises. Therapeutic Activity:  0 minutes     Gait: 0 minutes    Neuromuscular Re-Education:  0 minutes      Canalith Repositioning Procedure:  0 minutes    Manual Therapy:   25 minutes  Standing crests appear level, PSIS appear level  Standing flexion test negative  Sacral sulcus test negative  Flexion dec 25% with pain  Extension WFL with worse radicular pain  SLR test + L    Trial manual lumbar distraction in HL with belt, no pain, no change in symptoms. Modalities: 0 minutes    Functional Outcome Measure:  []NA    Measure Used: Mod oswestry  Score: 38/50    Assessment/Treatment/Activity Tolerance:  Increased weakness noted in L LE compared to evaluation, reflexes no change.    Patients response to treatment:   [x]Patient tolerated treatment well []Patient limited by fatigue   []Patient limited by pain  []Patient limited by other medical complications   []Other:     Goals:   Progress towards goals:   Goals established on 9/2/20    GOALS    Short Term Goals:   3  weeks MET Not Met Long Term Goals:   6  weeks MET Not Met   Establish HEP []?  []?  Pt independent with HEP []?  []?    Pain 5 /10 or less []?  []?  Pain  3/10 or less []?  []?    Increase identified strength deficits 1/3 grade  []?  []?  Increase strength to 4+/5 or greater  []?  []?    Achieve neutral alignment of pelvis / Resolve SI dysfunction if applicable, needs to further assessed []?  []?  Maintain neutral alignment / Normal SI mobility x 2 consecutive visits if applicable []?  []?    Gait WNL  []?  []?  Return to all ADLs/prior level of function []?  []?    Decrease subjective complaint of radicular pain LLE by 50% []?  []?  No radicular pain into LLE  []?  []?           Prognosis: [x]Good   []Fair   []Poor    Patient

## 2020-09-10 ENCOUNTER — TELEPHONE (OUTPATIENT)
Dept: ORTHOPEDIC SURGERY | Age: 30
End: 2020-09-10

## 2020-09-10 NOTE — TELEPHONE ENCOUNTER
S/W PATIENT scheduled EMG LLE and follow up per provider. Patient voiced understanding of the conversation and will contact the office with further questions or concerns.

## 2020-09-14 ENCOUNTER — HOSPITAL ENCOUNTER (OUTPATIENT)
Dept: PHYSICAL THERAPY | Age: 30
Setting detail: THERAPIES SERIES
Discharge: HOME | End: 2020-09-14
Payer: COMMERCIAL

## 2020-09-14 PROCEDURE — 97110 THERAPEUTIC EXERCISES: CPT

## 2020-09-14 PROCEDURE — 97012 MECHANICAL TRACTION THERAPY: CPT

## 2020-09-14 NOTE — FLOWSHEET NOTE
ABELINO, currently ant LE       Objective:       Exercises:    Exercises in bold performed in department today. Items not bolded are carried forward from prior visits for continuity of the record. Exercise/Equipment Resistance/Repetitions HEP Other comments       []      Positional distraction SL R over roll  Pt inst to do this for 10-30 min at a time, 3x per day or more as needed, and to monitor leg symptoms to see if they improve. [x] Pt to continue     Knee rocks  Pain free range, 3x10 [x]      GS in HL Pt to work up to 3x10 [x]      TA with exhale in HL Pt to work up to 3x10 [x]        []       Sciatic Nerve glide in supine, hip and knee 90 degrees, pt can use towel to hold leg, ankle DF/PF 2x10 [x] Monitor for increased pain       []        []          []         []        []        []        []        []        []        []        []      Therapeutic Exercise/Home Exercise Program:   23 minutes   HEP has been established, See above, pt given handout(s) of new exercises. Therapeutic Activity:  0 minutes     Gait: 0 minutes    Neuromuscular Re-Education:  0 minutes      Canalith Repositioning Procedure:  0 minutes    Manual Therapy:   2 minutes  Standing crests appear level, PSIS appear level      Modalities: 19 minutes  Mechanical lumbar traction in supine with legs on bench, black harness, 40# max/20# min, 45 sec/15sec x 15 minutes  No complaints    Functional Outcome Measure:  []NA    Measure Used: Mod oswestry  Score: 38/50    Assessment/Treatment/Activity Tolerance:  Improved great toe extension LLE compared to last visit.     Patients response to treatment:   [x]Patient tolerated treatment well []Patient limited by fatigue   []Patient limited by pain  []Patient limited by other medical complications   []Other:     Goals:   Progress towards goals:   Goals established on 9/2/20    GOALS    Short Term Goals:   3  weeks MET Not Met Long Term Goals:   6  weeks MET Not Met   Establish HEP []?  []?  Pt independent with HEP []?  []?    Pain 5 /10 or less []?  []?  Pain  3/10 or less []?  []?    Increase identified strength deficits 1/3 grade  []?  []?  Increase strength to 4+/5 or greater  []?  []?    Achieve neutral alignment of pelvis / Resolve SI dysfunction if applicable, needs to further assessed []?  []?  Maintain neutral alignment / Normal SI mobility x 2 consecutive visits if applicable []?  []?    Gait WNL  []?  []?  Return to all ADLs/prior level of function []?  []?    Decrease subjective complaint of radicular pain LLE by 50% []?  []?  No radicular pain into LLE  []?  []?           Prognosis: [x]Good   []Fair   []Poor    Patient Requires Follow-up:  [x]Yes  []No    Plan: []Plan of care initiated     [x]Continue per plan of care    [] Alter current plan (see comments)    []Hold pending MD visit []Discharge    Timed Code Treatment Minutes:  44    Total Treatment Minutes:  44    Medicare Cap total YTD:   [x]N/A    Workers Comp Time Stamp  [x]N/A   Time In:   Time Out:    Electronically signed by:  Arvell Hodgkin, PT, OMT-C, [id number]

## 2020-09-16 ENCOUNTER — HOSPITAL ENCOUNTER (OUTPATIENT)
Dept: PHYSICAL THERAPY | Age: 30
Setting detail: THERAPIES SERIES
Discharge: HOME | End: 2020-09-16
Payer: COMMERCIAL

## 2020-09-16 PROCEDURE — 97140 MANUAL THERAPY 1/> REGIONS: CPT

## 2020-09-16 PROCEDURE — 97110 THERAPEUTIC EXERCISES: CPT

## 2020-09-16 NOTE — FLOWSHEET NOTE
Dr. Johnathan Norwood,   Pt presents with diastasis recti among other concurrent signs and PMH indicative pelvic floor dysfunction. This may related heavily to pt's recurrent LBP. Recommended pt see Danica Bautista, pelvic floor physical therapist ([facility]) for evaluation and collaborative/consecutive care. Dr. Shirley Martin will need updated script with lumbar diagnosis ICD as well as pelvic floor pain to see pt. Please let me know if you have any questions!   Thank you,     Guillermo Salcido, PT, DPT, ATC, cert-APHPT    Outpatient Physical Therapy     [x] Daily Treatment Note   [] Progress Note   [] Discharge Note    Date:  9/16/2020    Patient Name:  Will Chiang       YOB: 1990    Medical Diagnosis/ ICD 10:  HNP, LUMBAR M51.26, lumbar radiculopathy M54.16, lumbar stenosis with neurogenic claudication M48.062, spondylosis without myelopathy or radiculopathy, lumbar region M47.816  Treatment Diagnosis:  LBP with radiculopathy LLE, abnormality of gait     Onset Date:  7/15/20    Referral Date:  8/28/20   Referring Physician:  Dr. Khushboo Lucero     Visits Allowed/Insurance/Certification Information:  [employer], 30 visits, no estim, no ionto, no DN     Restrictions/Precautions:  Hx of large HNP, lumbar discectomy and laminectomy L4-5 and L5-S1 in 12/2019     Plan of care sent to provider:      []Faxed  [x]Co-signature    (attempts: 1[x] 2 [x] 9/16/2020  3[])       Plan of care signed:      [x]Yes date: 9/3/20           []No      Progress Note covers period from (if applicable):    [x]NA    []From          To         Next Progress Note due:   9/23/20  Visit# / total visits:  4/12    Plan for Next Visit:    · Assess effectiveness of mechanical traction and continue if helpful  · Monitor LE strength/myotomes L LE- pt had no active great toe extension on 9/9 and weak ankle DF along with increased numbness in LE  · Modalities as needed/pain modulation  · Progress HEP as able     Subjective:    Pt states that the numbness in her leg is better and not as bad. Pt feels the traction helped. Pt has had back pain on/off for 8 years. Pt explains that she had complete tearing with her 2nd child, who is 6years old. Pt said she had a \"bump\" in her belly after that birth and from then on has had a split in her stomach. Pt recently noticed pain with sex and no follow up with gynecologist in a while. Extension/prone is the most painful position. Pain level: Pain currently 1/10 today L calf and ankle post thigh and hip  AT EVAL: Patient reports pain is  8/10 pain at present and  9/10 pain at its worst.  Pt presents with pain L LS and radiates down post LE to ankle. Complains intermittent numbness and tingling L LE, currently ant LE       Objective:   2 finger width diastasis recti, 1.5 finger depth split in inferior umbilicus      Exercises:    Exercises in bold performed in department today. Items not bolded are carried forward from prior visits for continuity of the record. Exercise/Equipment Resistance/Repetitions HEP Other comments       []      Positional distraction SL R over roll  Pt inst to do this for 10-30 min at a time, 3x per day or more as needed, and to monitor leg symptoms to see if they improve. [x] Pt to continue     Knee rocks  Pain free range, 3x10 [x]      GS in HL Pt to work up to 3x10 [x]      TA with exhale in HL Pt to work up to 3x10 [x]        []       Sciatic Nerve glide in supine, hip and knee 90 degrees, pt can use towel to hold leg, ankle DF/PF 2x10 [x] Difficult due to decr in ROM       []        []          []         []        []        []        []        []        []        []        []      Therapeutic Exercise/Home Exercise Program:   15 minutes (1u)   see above. Psoas release 5 min. Educated pt on role/benefits of pelvic floor PT and relation to back and core weakness.        Therapeutic Activity:  0 minutes  Gait: 0 minutes  Neuromuscular Re-Education:  0 minutes  Canalith Repositioning Procedure:  0 minutes    Manual Therapy:   25 minutes (2u)  L iliac acupressure  STM light-mod L anterior tib   Short axis manual traction (mod)      Modalities:  minutes      Functional Outcome Measure:  []NA    Measure Used: Mod oswestry  Score: 38/50    Assessment/Treatment/Activity Tolerance: Pt with improvement in LBP and L tib anterior pain. Pt open to seeing pelvic floor PT for care and possible relation to LBP. Pt with mod decr in tissue tension in L iliacus and psoas this date. DN may benefit if persists.      Patients response to treatment:   [x]Patient tolerated treatment well []Patient limited by fatigue   []Patient limited by pain  []Patient limited by other medical complications   []Other:     Goals:   Progress towards goals:   Goals established on 9/2/20    GOALS    Short Term Goals:   3  weeks MET Not Met Long Term Goals:   6  weeks MET Not Met   Establish HEP []?  []?  Pt independent with HEP []?  []?    Pain 5 /10 or less []?  []?  Pain  3/10 or less []?  []?    Increase identified strength deficits 1/3 grade  []?  []?  Increase strength to 4+/5 or greater  []?  []?    Achieve neutral alignment of pelvis / Resolve SI dysfunction if applicable, needs to further assessed []?  []?  Maintain neutral alignment / Normal SI mobility x 2 consecutive visits if applicable []?  []?    Gait WNL  []?  []?  Return to all ADLs/prior level of function []?  []?    Decrease subjective complaint of radicular pain LLE by 50% []?  []?  No radicular pain into LLE  []?  []?           Prognosis: [x]Good   []Fair   []Poor    Patient Requires Follow-up:  [x]Yes  []No    Plan: []Plan of care initiated     [x]Continue per plan of care    [] Alter current plan (see comments)    []Hold pending MD visit []Discharge    Timed Code Treatment Minutes:  40  Total Treatment Minutes:  40    Medicare Cap total YTD:   [x]N/A  Workers Comp Time Stamp  [x]N/A   Time In:   Time Out:    Electronically signed by:  Citlalli Manning, PT, DPT, [facility] [id number]

## 2020-09-21 ENCOUNTER — APPOINTMENT (OUTPATIENT)
Dept: PHYSICAL THERAPY | Age: 30
End: 2020-09-21
Payer: COMMERCIAL

## 2020-09-22 ENCOUNTER — HOSPITAL ENCOUNTER (OUTPATIENT)
Dept: PHYSICAL THERAPY | Age: 30
Setting detail: THERAPIES SERIES
Discharge: HOME | End: 2020-09-22
Payer: COMMERCIAL

## 2020-09-22 ENCOUNTER — OFFICE VISIT (OUTPATIENT)
Dept: ORTHOPEDIC SURGERY | Age: 30
End: 2020-09-22
Payer: COMMERCIAL

## 2020-09-22 PROCEDURE — 97140 MANUAL THERAPY 1/> REGIONS: CPT

## 2020-09-22 PROCEDURE — 95886 MUSC TEST DONE W/N TEST COMP: CPT | Performed by: PHYSICAL MEDICINE & REHABILITATION

## 2020-09-22 PROCEDURE — 97110 THERAPEUTIC EXERCISES: CPT

## 2020-09-22 PROCEDURE — 95908 NRV CNDJ TST 3-4 STUDIES: CPT | Performed by: PHYSICAL MEDICINE & REHABILITATION

## 2020-09-22 NOTE — FLOWSHEET NOTE
Outpatient Physical Therapy     [x] Daily Treatment Note   [] Progress Note   [] Discharge Note    Date:  9/22/2020    Patient Name:  Devyn Johnson       YOB: 1990    Medical Diagnosis/ ICD 10:  HNP, LUMBAR M51.26, lumbar radiculopathy M54.16, lumbar stenosis with neurogenic claudication M48.062, spondylosis without myelopathy or radiculopathy, lumbar region M47.816  Treatment Diagnosis:  LBP with radiculopathy LLE, abnormality of gait     Onset Date:  7/15/20    Referral Date:  8/28/20   Referring Physician:  Dr. Christine Childress     Visits Allowed/Insurance/Certification Information:  [employer], 30 visits, no estim, no ionto, no DN     Restrictions/Precautions:  Hx of large HNP, lumbar discectomy and laminectomy L4-5 and L5-S1 in 12/2019     Plan of care sent to provider:      []Faxed  [x]Co-signature    (attempts: 1[x] 2 [x] 9/16/2020  3[])       Plan of care signed:      [x]Yes date: 9/3/20           []No      Progress Note covers period from (if applicable):    [x]NA    []From          To         Next Progress Note due:   9/23/20  Visit# / total visits:  5/12    Plan for Next Visit:    · Assess effectiveness of mechanical traction and continue if helpful  · Monitor LE strength/myotomes L LE- pt had no active great toe extension on 9/9 and weak ankle DF along with increased numbness in LE  · Modalities as needed/pain modulation  · Progress HEP as able  · Waiting for new referral to see pelvic floor PT       Subjective:    Pt states that she had no pain and then has her EMG today which caused onset new onset of pain today. Pt states she'll try to keep up with exercises to maintain her back though pt describes doing mobility/stretching not strengthening. Pain level: Pain currently 3/10 today L calf and ankle post thigh and hip  AT EVAL: Patient reports pain is  8/10 pain at present and  9/10 pain at its worst.  Pt presents with pain L LS and radiates down post LE to ankle.  Complains intermittent numbness and tingling L LE, currently ant LE       Objective:   2 finger width diastasis recti, 1.5 finger depth split in inferior umbilicus      Exercises:    Exercises in bold performed in department today. Items not bolded are carried forward from prior visits for continuity of the record. Exercise/Equipment Resistance/Repetitions HEP Other comments       []      Positional distraction SL R over roll  Pt inst to do this for 10-30 min at a time, 3x per day or more as needed, and to monitor leg symptoms to see if they improve. [x] Pt to continue     Knee rocks  Pain free range, 3x10 [x]      GS in HL  3x10 [x]      TA with exhale in HL  x10 [x] decr % effort to avoid strain       []       Sciatic Nerve glide in supine, hip and knee 90 degrees, pt can use towel to hold leg, ankle DF/PF 2x10 [x] Difficult due to decr in ROM       []    Pelvic rotation on stool   x10  []        Pelvic tilting Fwd>back on plyoball x10 []         []    Standing rotation in stagger stance Blue ball x10 B [] To incr pelvic floor work and confidence with rotation       []        []        []        []        []        []      Therapeutic Exercise/Home Exercise Program:   25 minutes (2u) [employer] [facility]: [facility]   see above. Educated pt on correlation with hip, PF, LB, and pelvic pain and function. Therapeutic Activity:  0 minutes    Gait: 5 minutes    Ambulation with glute activation with heel strike, TA activation/think tall     Neuromuscular Re-Education:  0 minutes  Canalith Repositioning Procedure:  0 minutes    Manual Therapy:   23 minutes (1u)  GrIII CPA sacrum, lower lumbar   PMR L glutes, L HS, L gastroc  STM, PMR L anterior ankle  Standing manual traction with ambulation      Modalities:  minutes      Functional Outcome Measure:  []NA    Measure Used:  Mod oswestry  Score: 38/50    Assessment/Treatment/Activity Tolerance: Pt with decr in pn and resolution in limp/antalgic gait with gait training and

## 2020-09-23 ENCOUNTER — APPOINTMENT (OUTPATIENT)
Dept: PHYSICAL THERAPY | Age: 30
End: 2020-09-23
Payer: COMMERCIAL

## 2020-09-23 NOTE — PROGRESS NOTES
Physical Therapy  [facility] at [phone] - [place] for Dr Sujatha Loving - asking for PT RX for Pelvic Floor Dysfunction to address patient's symptoms.

## 2020-09-24 ENCOUNTER — HOSPITAL ENCOUNTER (OUTPATIENT)
Dept: PHYSICAL THERAPY | Age: 30
Setting detail: THERAPIES SERIES
Discharge: HOME | End: 2020-09-24
Payer: COMMERCIAL

## 2020-09-24 PROCEDURE — 97110 THERAPEUTIC EXERCISES: CPT

## 2020-09-24 PROCEDURE — 97012 MECHANICAL TRACTION THERAPY: CPT

## 2020-09-24 PROCEDURE — 97140 MANUAL THERAPY 1/> REGIONS: CPT

## 2020-09-24 NOTE — PROGRESS NOTES
Outpatient Physical Therapy     [x] Daily Treatment Note   [x] Progress Note   [] Discharge Note    Date:  9/24/2020    Patient Name:  Efrain Orr       YOB: 1990    Medical Diagnosis/ ICD 10:  HNP, LUMBAR M51.26, lumbar radiculopathy M54.16, lumbar stenosis with neurogenic claudication M48.062, spondylosis without myelopathy or radiculopathy, lumbar region M47.816  Treatment Diagnosis:  LBP with radiculopathy LLE, abnormality of gait     Onset Date:  7/15/20    Referral Date:  8/28/20   Referring Physician:  Dr. Rebecca Yousif     Visits Allowed/Insurance/Certification Information:  [employer], 30 visits, no estim, no ionto, no DN     Restrictions/Precautions:  Hx of large HNP, lumbar discectomy and laminectomy L4-5 and L5-S1 in 12/2019     Plan of care sent to provider:      []Faxed  [x]Co-signature    (attempts: 1[x] 2 [x] 9/16/2020  3[])       Plan of care signed:      [x]Yes date: 9/3/20           []No      Progress Note covers period from (if applicable):    []NA    [x]From 9/2/20 To 9/24/20         Next Progress Note due:   By 10/22/20  Visit# / total visits:  6/12    Plan for Next Visit:   · Pt transferring to care of  Andrei Ortiz, PT at [facility] location for pelvic floor evaluation and continuation of LB treatment. · Monitor LE strength/myotomes L LE  · Progress HEP as able  · Pt reports relief with mechanical traction > manual traction   · Increased neural tension L LE vs tissue tension        Subjective:    Pt states she is doing better, overall pain much less than at eval, numbness in LE improving. Pt complains today that she sees a \"black spot with fuzziness surrounding it and it looks like its wiggling\" in her L visual field. Pt states it just started as she was walking in the parking lot. PT questioned if pt has hx of migraines, pt states she has had maybe 2 significant HAs in the past with some optical symptoms but has never been diagnosed with migraine.    Pt states radicular pain in L leg is 65-70% better since starting PT. Pt feels like she can do most ADLs except lifting 1year old. Pt generally tries to limit lifting to 10 pounds or less. Pt had EMG this week  States the strength in her ankle is better but still has weakness great toe      Pain level: Pain currently 0/10, pain as high as 3-4/10 in past few days,  L Post calf, ant ankle and post thigh. AT EVAL: Patient reports pain is  8/10 pain at present and  9/10 pain at its worst.  Pt presents with pain L LS and radiates down post LE to ankle. Complains intermittent numbness and tingling L LE, currently ant LE       Objective:      Exercises:    Exercises in bold performed in department today. Items not bolded are carried forward from prior visits for continuity of the record. Exercise/Equipment Resistance/Repetitions HEP Other comments       []      Positional distraction SL R over roll  Pt inst to do this for 10-30 min at a time, 3x per day or more as needed, and to monitor leg symptoms to see if they improve.     [x] Pt to continue     Knee rocks  Pain free range, 3x10 [x]      GS in HL  3x10 [x]      TA with exhale in HL  x10 [x] decr % effort to avoid strain       []       Sciatic Nerve glide in supine, hip and knee 90 degrees, ankle DF/PF 2x10 [x] Pt inst she can advance glide by extending knee to tolerance       []    Pelvic rotation on stool   x10  []        Pelvic tilting Fwd>back on plyoball x10 []         []    Standing rotation in stagger stance Blue ball x10 B [] To incr pelvic floor work and confidence with rotation     Standing gastroc stretch  30 sec, 3 reps B  [x]        []        []        []        []        []      Therapeutic Exercise/Home Exercise Program:  40  minutes (3u) [employer] Access: [email or web address]   Assessed BP due to visual changes today:   /84 pulse 66     Reviewed EMG results:   Summary:      Nerve conduction studies are normal.  Monopolar exam shows polyphasic prolonged duration

## 2020-09-28 ENCOUNTER — APPOINTMENT (OUTPATIENT)
Dept: PHYSICAL THERAPY | Age: 30
End: 2020-09-28
Payer: COMMERCIAL

## 2020-09-29 ENCOUNTER — OFFICE VISIT (OUTPATIENT)
Dept: ORTHOPEDIC SURGERY | Age: 30
End: 2020-09-29
Payer: COMMERCIAL

## 2020-09-29 VITALS — RESPIRATION RATE: 12 BRPM | TEMPERATURE: 98.2 F | HEIGHT: 61 IN | BODY MASS INDEX: 24.56 KG/M2 | WEIGHT: 130.07 LBS

## 2020-09-29 PROCEDURE — 4004F PT TOBACCO SCREEN RCVD TLK: CPT | Performed by: PHYSICAL MEDICINE & REHABILITATION

## 2020-09-29 PROCEDURE — 99214 OFFICE O/P EST MOD 30 MIN: CPT | Performed by: PHYSICAL MEDICINE & REHABILITATION

## 2020-09-29 PROCEDURE — G8427 DOCREV CUR MEDS BY ELIG CLIN: HCPCS | Performed by: PHYSICAL MEDICINE & REHABILITATION

## 2020-09-29 PROCEDURE — G8420 CALC BMI NORM PARAMETERS: HCPCS | Performed by: PHYSICAL MEDICINE & REHABILITATION

## 2020-09-29 RX ORDER — METHYLPREDNISOLONE 4 MG/1
TABLET ORAL
Qty: 1 KIT | Refills: 0 | Status: SHIPPED | OUTPATIENT
Start: 2020-09-29 | End: 2020-10-05

## 2020-09-29 NOTE — LETTER
Please schedule the following with:     Date:   10/27/20 @ 7:45    Account: [de-identified]  Patient: Franklyn Meigs    : 1990  Address:  [address] [place] [address] [address] [id number]    Phone (H):  [phone] (home)      ----------------------------------------------------------------------------------------------  Diagnosis:     ICD-10-CM    1. HNP (herniated nucleus pulposus), lumbar  M51.26    2. Lumbar radiculopathy  M54.16    3. Lumbar stenosis with neurogenic claudication  M48.062          Levels: L4 and L5  Procedure type Transforaminal epidural steroid injection   Side Left  CPT Codes 78884, 19534    ----------------------------------------------------------------------------------------------  Injection # 1   [email or web address]    Attending Physician       Ted Thao. Chris Schroeder MD.  ----------------------------------------------------------------------------------------------  Injection Scheduled For:    At:    [address]  Pre-Cert#    [employer]     Pre-Cert#    Comments: NEEDS COVID TESTING     · Infection control  ? Tested positive for MRSA in past 12 months:  no  ? Tested positive for MSSA \"staph infection\" in past 12 months: no  ? Tested positive for VRE (Vancomycin Resistant Enterococci) in past 12 months:   no  ? Currently on any antibiotics for an infection: no  · Anticoagulants:  ? On a blood thinner:  no   ?  Any history of bleeding disorder: no   · Advanced Liver disease: no   · Advanced Renal disease: no   · Glaucoma: no   · Diabetes: no      Sedation:         No  -----------------------------------------------------------------------------------------------  Allergies   Allergen Reactions    Nickel     Percocet [Oxycodone-Acetaminophen] Itching

## 2020-09-29 NOTE — PROGRESS NOTES
Follow up: Vel  1990  [id number]         Chief Complaint   Patient presents with    Lower Back Pain     F/U LSP - pain level decreased from 8/10 to 2/10 since LOV. has con't PT.  Leg Pain     TR EMG LLE          HISTORY OF PRESENT ILLNESS:  Ms. Lasah Simmons is a 31 y.o. female returns for a follow up visit for multiple medical problems. Her current presenting problems are   1. HNP (herniated nucleus pulposus), lumbar    2. Lumbar radiculopathy    3. Lumbar stenosis with neurogenic claudication    . As per information/history obtained from the PADT(patient assessment and documentation tool) - She complains of pain in the lower back with radiation to the lower leg Left She rates the pain 6/10 and describes it as sharp, aching, squeezing, numbness. Pain is made worse by: walking, standing, sitting. She denies side effects from the current pain regimen. Patient reports that since the last follow up visit the physical functioning is unchanged, family/social relationships are unchanged, mood is unchanged and sleep patterns are unchanged, and that the overall functioning is worsening. Patient denies neurological bowel or bladder. Ms. Lasha Simmons presents for follow up of ongoing low back and left leg pain. She recently underwent an EMG of her left lower extremity on 9/22/2020. She rates her back pain 5/10 at its worst and her leg pain a constant  6/10. She describes her back pain as sharp and stabbing and leg symptoms as aching and pressure. Symptoms radiate down the left leg to the calf. Aggravating factors include lifting anything heavier than 10 pounds, bending, and traction. Alleviating factors include lying down and some physical therapy exercises. Recent treatment includes physical therapy rom 9/1/2020 to 9/24/2020 for a total of 6 visits, chiropractic care, oral steroids and muscle relaxants with no relief.  The patient does describe weakness of the left leg when walking however she denies falls. She does not present with any assistive devices in the office. Associated signs and symptoms:   Neurogenic bowel or bladder symptoms:  no   Perceived weakness:  yes   Difficulty walking:  yes            Past medical, surgical, social and family history reviewed with the patient. No pertinent relevant history  Past Medical History:   Past Medical History:   Diagnosis Date    Asthma     as a child    Bacterial vaginosis     Chlamydia     Mental disorder     depression as an adolescent    Sciatic pain       Past Surgical History:     Past Surgical History:   Procedure Laterality Date    EPIDURAL STEROID INJECTION Bilateral 6/4/2019    BILATERAL LUMBAR FOUR TRANSFORAMINAL EPIDURAL STEROID INJECTION SITE CONFIRMED BY FLUOROSCOPY performed by Pablito Yousif MD at [address] N/A 12/20/2019    MICROLUMBAR DISCECTOMY L5-S1 performed by Cuong Hartman MD at [address] [facility] DX/THER SBST INTRLMNR CRV/THRC W/IMG GDN Left 8/28/2018    LEFT LUMBAR FIVE SACRAL ONE TRANSFORAMINAL EPIDURAL STEROID INJECTION SITE CONFIRMED BY FLUOROSOCOPY performed by Pablito Yousif MD at [address] [facility]  October 2010     Current Medications:     Current Outpatient Medications:     norethindrone (MICRONOR) 0.35 MG tablet, Take 0.35 mg by mouth daily, Disp: , Rfl:   Allergies:  Nickel and Percocet [oxycodone-acetaminophen]  Social History:    reports that she has been smoking. She has been smoking about 0.50 packs per day. She has never used smokeless tobacco. She reports that she does not drink alcohol or use drugs.   Family History:   Family History   Problem Relation Age of Onset    High Blood Pressure Mother     Diabetes Maternal Grandmother     High Blood Pressure Maternal Grandmother     Diabetes Maternal Grandfather     Diabetes Paternal Grandmother     Diabetes Paternal Grandfather     Heart Disease Paternal Grandfather        REVIEW OF SYSTEMS: paraspinal tenderness: Mild L4/5 and L5/S1 tenderness  Bursal tenderness No tenderness bilaterally  There is no paraspinal spasm. · Range of Motion: limited by 50% in all planes due to pain  · Strength:  4/5 left lower extremity , 5/5 RLE  · Special Tests:   Straight leg raise positive left and crossed SLR negative. Wayne's testing is negative bilaterally. FADIR's testing is negative bilaterally. Bowstring test negative. Slump test negative. · Skin: There are no rashes, ulcerations or lesions. · Reflexes: Reflexes are symmetrically 1+ at the patellar and ankle tendons. Clonus absent bilaterally at the feet. · Gait & station: antalgic on the left and no ataxia  · Additional Examinations:  · RIGHT LOWER EXTREMITY: Inspection/examination of the right lower extremity does not show any tenderness, deformity or injury. Range of motion is normal and pain-free. There is no gross instability. There are no rashes, ulcerations or lesions. Strength and tone are normal. No atrophy or abnormal movements are noted. · LEFT LOWER EXTREMITY:  Inspection/examination of the left lower extremity does not show any tenderness, deformity or injury. Range of motion is normal and pain-free. There is no gross instability. There are no rashes, ulcerations or lesions. Strength and tone are normal. No atrophy or abnormal movements are noted. Diagnostic Testing:    EMG LLE   Impression:    Abnormal examination. There is electrodiagnostic evidence of:     1. Findings suggestive of a mild chronic left L5 radiculopathy with mild chronic distal ongoing axonal loss  2.  No definitive evidence of any other left lower extremity mononeuropathy or plexopathy     Results for orders placed or performed during the hospital encounter of 08/21/16   Urine reflex to culture    Specimen: Urine, clean catch   Result Value Ref Range    Color, UA Yellow Straw/Yellow    Clarity, UA Clear Clear    Glucose, Ur Negative Negative mg/dL    Bilirubin Urine Negative Negative    Ketones, Urine TRACE (A) Negative mg/dL    Specific Gravity, UA >=1.030 1.005 - 1.030    Blood, Urine Negative Negative    pH, UA 5.5 5.0 - 8.0    Protein, UA TRACE (A) Negative mg/dL    Urobilinogen, Urine 0.2 <2.0 E.U./dL    Nitrite, Urine Negative Negative    Leukocyte Esterase, Urine Negative Negative    Microscopic Examination YES     Urine Reflex to Culture Not Indicated     Urine Type Not Specified    Microscopic Urinalysis   Result Value Ref Range    WBC, UA 0-2 0 - 5 /HPF    RBC, UA 0-2 0 - 2 /HPF    Epithelial Cells, UA 3-5 /HPF    Bacteria, UA 1+ (A) /HPF    Crystals, UA 1+ Ca. Oxalate (A) /HPF     Impression:       1. HNP (herniated nucleus pulposus), lumbar    2. Lumbar radiculopathy    3. Lumbar stenosis with neurogenic claudication        Plan:  Clinical Course: Above diagnoses are worsening    I discussed the diagnosis and the treatment options with Marilee Robledo today. In Summary:  The various treatment options were outlined and discussed with Marilee Robledo including:  Conservative care options: physical therapy, ice, medications, bracing, and activity modification. The indications for therapeutic injections. The indications for additional imaging/laboratory studies. The indications for (possible future) interventions. After considering the various options discussed, Marilee Robledo elected to pursue a course of treatment that includes the followin. Medications:  I will prescribe a medrol dose pack to help with the inflammatory component of pain. Risks, benefits and alternatives were discussed. Recommended to stop any NSAIDs to reduce risk of GI bleed during the course of the dose pack. 2. PT:  Encouraged to continue with Home exercise program.    3. Further studies: COVID-19 PCR testing prior to procedure. MRI lumbar spine to evaluate for weakness in the left leg. EMG shows ongoing axonal loss. 4.  Interventional:  We discussed pursuing a left questions. Gino Baxter. Clydene Frankel, MD, [facility], [address] [place] [address]  Board Certified in [address] [place] [address] Certified and Fellowship Trained in [facility] ([facility])             This dictation was performed with a verbal recognition program [facility]) and it was checked for errors. It is possible that there are still dictated errors within this office note. If so, please bring any errors to my attention for an addendum. All efforts were made to ensure that this office note is accurate.

## 2020-09-30 ENCOUNTER — HOSPITAL ENCOUNTER (OUTPATIENT)
Dept: PHYSICAL THERAPY | Age: 30
Setting detail: THERAPIES SERIES
Discharge: HOME | End: 2020-09-30
Payer: COMMERCIAL

## 2020-09-30 ENCOUNTER — APPOINTMENT (OUTPATIENT)
Dept: PHYSICAL THERAPY | Age: 30
End: 2020-09-30
Payer: COMMERCIAL

## 2020-09-30 PROCEDURE — 97162 PT EVAL MOD COMPLEX 30 MIN: CPT

## 2020-09-30 PROCEDURE — 97140 MANUAL THERAPY 1/> REGIONS: CPT

## 2020-09-30 PROCEDURE — 97530 THERAPEUTIC ACTIVITIES: CPT

## 2020-09-30 NOTE — FLOWSHEET NOTE
[facility]  [address] [facility], [address] [place] [address] [address]  Phone: [phone]   Fax: [phone]      Physical Therapy Pelvic Floor Evaluation    2020  Patient: Sarbjit Silva : 1990  MRN: [id number]    This is a 31 y.o. female referred by Toi Aguirre MD to [place] [address] with a primary diagnosis of: Pelvic Floor Dysfunction M62.89    Onset Date: 2019  Next MD appt: 11/10/2020    Subjective:   Patient history: Patient transferred from another location for treatment. Was being seen for back pain and having some improvement in symptoms, though no complete resolution. Patient reports \"I had back pain off and on for 8 years, I had a lot of injections and such and then I had a back surgery in 2019\"  \"I didn't have any issues for about 8 months then a dog jumped in my arms and my back has been out since\". States she is unsure when her pelvic floor dysfunction started, not sure if after first child was born or when it started; is experiencing painful intercourse, prolapse, urinary leakage, and bowel leakage. States \"I was doing really good until my friend's dog jumped into my arms\". Denies history of sexual abuse/trauma. Is waiting on a repeat MRI to assess lumbar spine. 2nd person present during evaluation today? Declined  What do you feel is your problem? \"I am not sure\"  When did you problem first start? \"I think maybe after my first child\"  Has your problem been getting worse, stay the same, or getting better? \"getting worse\"  Have you ever had treatment for this problem? NO    4 week or greater of failed trial of PFPT program? NO   PFPT program as defined by \"Completing 4 weeks of an ordered plan of pelvic muscle exercises designed to increase periurethral muscle strength\". Urinary frequency? Daytime? YES Nighttime? NO  Bowel problems? Leakage when loose  Urinary or bowel leakage?  Yes to both   Leakage frequency? occasionally Protection: none   Pregnancy:   # of pregnancies:4   # of deliveries: 3 vaginal, 1 miscarriage    Episiotomy: yes   Normal post-rolanda healing? yes  Are you having regular menstrual cycles? yes  Date of last pap smear?     Pain: current level of pain: 10  Location: back  Pain: Best? 1/10  Worst? 4/10  Increased pain? Standing, sitting, sleeping on back, lifting  Decreases pain? Side-lying, massage, TENS unit    Sensation/neurovascular: intact to light touch, (L) LE tingling reported \"my whole leg, all of the time, the severity changes but it's always kind of numb\"    Diagnostic tests: MRI: Lumbar disc herniation present on 2019, has not had an updated MRI since surgery in 19     Precautions/Contraindications: universal     Past Medical History:   Diagnosis Date    Asthma     as a child    Bacterial vaginosis     Chlamydia     Mental disorder     depression as an adolescent    Sciatic pain      Past Surgical History:   Procedure Laterality Date    EPIDURAL STEROID INJECTION Bilateral 2019    BILATERAL LUMBAR FOUR TRANSFORAMINAL EPIDURAL STEROID INJECTION SITE CONFIRMED BY FLUOROSCOPY performed by Colton Dean MD at [address] N/A 2019    MICROLUMBAR DISCECTOMY L5-S1 performed by Ned Kaye MD at [address] [facility] DX/THER SBST INTRLMNR CRV/THRC W/IMG GDN Left 2018    LEFT LUMBAR FIVE SACRAL ONE TRANSFORAMINAL EPIDURAL STEROID INJECTION SITE CONFIRMED BY FLUOROSOCOPY performed by Colton Dean MD at [facility]  2010     Not in a hospital admission.     Current Outpatient Medications:     methylPREDNISolone (MEDROL, ROSE MARY,) 4 MG tablet, Take by mouth., Disp: 1 kit, Rfl: 0    norethindrone (MICRONOR) 0.35 MG tablet, Take 0.35 mg by mouth daily, Disp: , Rfl:   Allergies   Allergen Reactions    Nickel     Percocet [Oxycodone-Acetaminophen] Itching       Objective:    PFDI score: 136.4/300    Observation:   Posture: WFL   Gait analysis: WFL  Lumbar Mobility: Flexion: 75% with increased symptoms down (L) LE, Ext. 50% w/o pain, (L) SB: 100% with moderate pain, (R) SB: 100% w/o pain    Scar Location/Mobility: lumbar incision: good mobility, no pain    Special Tests:  Sacroiliac Test:   Gaenslen: negative    EVIE: negative     Lumbar Spine:   Slump test   SLR    Neuro:   Sensation: (B) UEs: intact to light touch   Sensation: (B) LEs: intact to light touch   Anal Sensation:    S5 intact to light touch    S4 intact to light touch    S3 intact to light touch   Reflexes:     Anal: present    Cough: present    Pelvic Floor Exam  External:  Skin Condition: good  Sensation: intact  Palpation: no point tenderness noted  Tone: hypotonic  Perineal Body Mobility:    Voluntary PFM Contraction: present (normal)   Voluntary PFM Relaxation: present (normal)   Involuntary PFM Contraction/Cough Reflex: present (normal)   Involuntary PFM Relaxation: present (normal)  Perineal Body Descent:   Rest: supported   Bearing down: absent (normal-cephalad)    Q-tip test: 0/10    Internal:  Sensation: intact  Palpation: point tenderness reported at (B) LA/pudendal nerve  Vaginal Vault Size: WNL  PERFECT:   Power: 3/5   Endurance: 3sec. Repetitions:7   Fast Twitch:6   Elevation:present   Co-contraction: not present   Timing: present  OI strength: 4/5 (B)  Pelvic Organ Prolapse: Grade 1+ cystocele, Grade 1+ rectocele      Treatment: see flowsheet    Assessment:  Impression: Patient is a 24yo female referred to PT due to back pain and pelvic floor dysfunction. The patient is a transfer from [facility] in which she was being treated for back pain. The patient demonstrated some pelvic floor dysfunction this date, unsure if related to back pain or not. Patient demonstrated decreased pelvic floor strength, endurance and overall function.   The patient is reporting painful intercourse and did express pain with palpation of (B) LA. The patient will continue back therapy and begin pelvic floor therapy with this PT to continue to assess back and pelvic floor correlation. Time Frame: 6 weeks. Rehab Potential: fair to good    Goals:  1. Patient will demonstrate independence with HEP to self-manage symptoms. 2. Patient will demonstrate improved pelvic floor strength to at least 4-/5 to improve functional strength. 3. Patient will improve pelvic floor endurance to at least 8 sec. to improve continence. 4. Patient will improve PFDI by at least 25 points to demonstrate improved pelvic floor functioning and quality of life. Plan  Patient will be seen 1-2x/week for 6weeks. Rx to consist of: Home management, NMred, manual, modalities PRN, TA, TE    Time IN/OUT: 2:10pm-3:35pm (85min)      Bryant Moran PT, DPT    ______________________________________________________________________    If you have any questions or concerns, please don't hesitate to call.   Thank you for your referral.    Physician Signature:________________________________Date:__________________  By signing above, therapists plan is approved by physician

## 2020-09-30 NOTE — FLOWSHEET NOTE
Physical Therapy Daily Treatment Note    Date:  2020    Patient Name:  Leverette Lesches    :  1990  MRN: [id number]  Restrictions/Precautions:  Universal   Medical/Treatment Diagnosis Information:  · Diagnosis: Pelvic Floor Dysfunction O92.84  Insurance/Certification information:  PT Insurance Information: [employer]  Physician Information:  Referring Practitioner: Jessie Chi MD  Plan of care signed (Y/N):  N  Visit# / total visits:       G-Code (if applicable):          PFDI: 136.4/300    Medicare Cap (if applicable):  n/a = total amount used, updated 2020    Time in:   2:10pm      Timed Treatment: 70min. Total Treatment Time:  85min.  ________________________________________________________________________________________    Pain Level:    /10  SUBJECTIVE:  See eval    OBJECTIVE:     Exercise (TE) Resistance/Repetitions Other comments            PF strengthening        Short hold: (1sec) 10 times       Long hold: (10sec) 10 times                     PF relaxation Belly breathing:x10         Hip ADD stretch:x10         Happy Baby Pose:x10                         Other Treatment:   TA:  Bladder re-training/education:     Bladder Diary: patient educated on importance of filling out bladder diary at home, complete with fluid intake, voids, and leakage when applicable. Voiding: patient educated on normal voiding and urinary cycle and the physiology of bladder control muscles and pelvic floor. The patient was educated on bladder dysfunction as well as JAS with urethral involvement. The patient was also educated on prolapse and it's affect on urination and pain. Dietary: patient educated on the \"4Cs\" to reduce bladder irritation and leakage.     Other: back pain and pelvic floor issue correlations/education     Manual Treatments: perfect update, trigger point release to (B) LA        Modalities:  --    Test/Measurements:  See eval         ASSESSMENT:    See eval     Treatment/Activity Tolerance:   [x] Patient tolerated treatment well [] Patient limited by fatique  [] Patient limited by pain [] Patient limited by other medical complications  [] Other:     Goals:    Time Frame: 6 weeks. Rehab Potential: fair to good     Goals:  1. Patient will demonstrate independence with HEP to self-manage symptoms. 2. Patient will demonstrate improved pelvic floor strength to at least 4-/5 to improve functional strength. 3. Patient will improve pelvic floor endurance to at least 8 sec. to improve continence. 4. Patient will improve PFDI by at least 25 points to demonstrate improved pelvic floor functioning and quality of life.         Plan: [x] Continue per plan of care [] Alter current plan (see comments)   [] Plan of care initiated [] Hold pending MD visit [] Discharge      Plan for Next Session:  Review diary, TE    Re-Certification Due Date:  Visit 12     Signature:  Brandon Cortes PT, DPT

## 2020-10-01 ENCOUNTER — APPOINTMENT (OUTPATIENT)
Dept: PHYSICAL THERAPY | Age: 30
End: 2020-10-01
Payer: COMMERCIAL

## 2020-10-05 ENCOUNTER — APPOINTMENT (OUTPATIENT)
Dept: PHYSICAL THERAPY | Age: 30
End: 2020-10-05
Payer: COMMERCIAL

## 2020-10-05 ENCOUNTER — INITIAL CONSULT (OUTPATIENT)
Dept: SURGERY | Age: 30
End: 2020-10-05
Payer: COMMERCIAL

## 2020-10-05 VITALS
BODY MASS INDEX: 23.71 KG/M2 | WEIGHT: 125.6 LBS | DIASTOLIC BLOOD PRESSURE: 74 MMHG | SYSTOLIC BLOOD PRESSURE: 126 MMHG | HEIGHT: 61 IN | TEMPERATURE: 97.7 F

## 2020-10-05 PROCEDURE — 99242 OFF/OP CONSLTJ NEW/EST SF 20: CPT | Performed by: SURGERY

## 2020-10-05 PROCEDURE — G8427 DOCREV CUR MEDS BY ELIG CLIN: HCPCS | Performed by: SURGERY

## 2020-10-05 PROCEDURE — G8420 CALC BMI NORM PARAMETERS: HCPCS | Performed by: SURGERY

## 2020-10-05 PROCEDURE — G8484 FLU IMMUNIZE NO ADMIN: HCPCS | Performed by: SURGERY

## 2020-10-05 RX ORDER — SODIUM CHLORIDE 0.9 % (FLUSH) 0.9 %
10 SYRINGE (ML) INJECTION EVERY 12 HOURS SCHEDULED
Status: CANCELLED | OUTPATIENT
Start: 2020-10-05

## 2020-10-05 RX ORDER — SODIUM CHLORIDE 0.9 % (FLUSH) 0.9 %
10 SYRINGE (ML) INJECTION PRN
Status: CANCELLED | OUTPATIENT
Start: 2020-10-05

## 2020-10-06 PROBLEM — K64.9 HEMORRHOIDS: Status: ACTIVE | Noted: 2020-10-06

## 2020-10-07 ENCOUNTER — HOSPITAL ENCOUNTER (OUTPATIENT)
Dept: PHYSICAL THERAPY | Age: 30
Setting detail: THERAPIES SERIES
Discharge: HOME | End: 2020-10-07
Payer: COMMERCIAL

## 2020-10-07 ENCOUNTER — TELEPHONE (OUTPATIENT)
Dept: ORTHOPEDIC SURGERY | Age: 30
End: 2020-10-07

## 2020-10-07 PROCEDURE — 97530 THERAPEUTIC ACTIVITIES: CPT

## 2020-10-07 PROCEDURE — 97110 THERAPEUTIC EXERCISES: CPT

## 2020-10-07 NOTE — TELEPHONE ENCOUNTER
S/w patient regarding MRI LSP approval and authorization being valid until 09/29/2021. Patient was instructed to call [employer] to schedule MRI LSP, then contact our office for follow up appointment. MRI LSP results will not be given over the phone or via [email or web address]. Patient was also asked to allow a minimum 48 hours for follow up appointment from MRI scan in order for staff to obtain MRI report. Patient currently has a follow up appointment schedule for 11/10/20 @ [facility]. Advised to contact the office if needing to reschedule this to accommodate MRI scan. Patient voiced understanding of MRI results not being given over the phone.

## 2020-10-07 NOTE — FLOWSHEET NOTE
Code: [email or web address]   URL: [email or web address]   Date: 10/07/2020   Prepared by: Jc Tavarez     Exercises   Supine Posterior Pelvic Tilt with Knee Rocks - 10 reps - 3 sets - 1x daily - 7x weekly   Soleus Stretch on Wall - 10 reps - 3 sets - 1x daily - 7x weekly   Supine Sciatic Nerve Glide - 10 reps - 3 sets - 1x daily - 7x weekly   Hooklying Transversus Abdominis Palpation - 10 reps - 3 sets - 1x daily - 7x weekly   Seated Pelvic Floor Contractions - 10 reps - 3 sets - 1x daily - 7x weekly   Seated Pelvic Floor Contractions - 10 reps - 3 sets - 1x daily - 7x weekly   Hooklying Bridge - 10 reps - 3 sets - 1x daily - 7x weekly   Clamshell - 10 reps - 3 sets - 1x daily - 7x weekly       Other Treatment:   TA:  Bladder re-training/education:     Bladder Diary: voiding 9-12x/day, 0 urinary leakage episodes, 1 BM (normal) per day    Voiding:educated to void every 2-3 hours. Dietary: patient educated on the \"4Cs\" to reduce bladder irritation and leakage. Other: potential use of dilators, gave patient information to review and order if wanted     Manual Treatments:      Modalities:  --    Test/Measurements:  See eval         ASSESSMENT:   The patient performed above TE well with no increase in pain. The patient adapted nicely to added TE this date. Bladder diary showed no urinary leakage or urinary urgency. Patient educated on 4C's to avoid to reduce number of voids per day. Patient demonstrated good understanding of all education provided today. The patient will continue to increase compliance with HEP and follow up next week. Treatment/Activity Tolerance:   [x] Patient tolerated treatment well [] Patient limited by fatique  [] Patient limited by pain [] Patient limited by other medical complications  [] Other:     Goals:    Time Frame: 6 weeks. Rehab Potential: fair to good     Goals:  1. Patient will demonstrate independence with HEP to self-manage symptoms.   2. Patient will demonstrate

## 2020-10-08 ENCOUNTER — TELEPHONE (OUTPATIENT)
Dept: ORTHOPEDIC SURGERY | Age: 30
End: 2020-10-08

## 2020-10-14 ENCOUNTER — HOSPITAL ENCOUNTER (OUTPATIENT)
Dept: PHYSICAL THERAPY | Age: 30
Setting detail: THERAPIES SERIES
Discharge: HOME | End: 2020-10-14

## 2020-10-14 NOTE — PROGRESS NOTES
Physical Therapy  Left a voicemail for patient that we are cancelling appointment for today due to provider being ill

## 2020-10-21 ENCOUNTER — HOSPITAL ENCOUNTER (OUTPATIENT)
Dept: PHYSICAL THERAPY | Age: 30
Setting detail: THERAPIES SERIES
Discharge: HOME | End: 2020-10-21
Payer: COMMERCIAL

## 2020-10-21 ENCOUNTER — OFFICE VISIT (OUTPATIENT)
Dept: PRIMARY CARE CLINIC | Age: 30
End: 2020-10-21
Payer: COMMERCIAL

## 2020-10-21 PROCEDURE — G8420 CALC BMI NORM PARAMETERS: HCPCS | Performed by: NURSE PRACTITIONER

## 2020-10-21 PROCEDURE — 97140 MANUAL THERAPY 1/> REGIONS: CPT

## 2020-10-21 PROCEDURE — 99211 OFF/OP EST MAY X REQ PHY/QHP: CPT | Performed by: NURSE PRACTITIONER

## 2020-10-21 PROCEDURE — 97530 THERAPEUTIC ACTIVITIES: CPT

## 2020-10-21 PROCEDURE — G8428 CUR MEDS NOT DOCUMENT: HCPCS | Performed by: NURSE PRACTITIONER

## 2020-10-21 PROCEDURE — 97110 THERAPEUTIC EXERCISES: CPT

## 2020-10-21 NOTE — FLOWSHEET NOTE
[facility] Outpatient Rehabilitation and Therapy  [address] [place] [address] 677, [address] [facility] [address]  Phone: [phone]   Fax: [phone]      Physical Therapy Daily Treatment Note    Date:  10/21/2020    Patient Name:  Franklin Mccray    :  1990  MRN: [id number]  Restrictions/Precautions:  Universal   Medical/Treatment Diagnosis Information:  · Diagnosis: Pelvic Floor Dysfunction M62.89 HNP, LUMBAR M51.26, lumbar radiculopathy M54.16, lumbar stenosis with neurogenic claudication M48.062, spondylosis without myelopathy or radiculopathy, lumbar region M47.816  Treatment Diagnosis:  LBP with radiculopathy LLE, abnormality of gait  Insurance/Certification information:  PT Insurance Information: [facility]  Physician Information:  Referring Practitioner: Gilma Garcia MD  Plan of care signed (Y/N):  Y  Visit# / total visits:       G-Code (if applicable):         PFDI: 136.4/300  Mod oswestry Score: 38/50    Medicare Cap (if applicable):  n/a = total amount used, updated 10/21/2020    Time in:   2:15pm      Timed Treatment: 45min. Total Treatment Time:  45min.  ____________________________________________________________________________________    Pain Level:    1/10 (L) calf  SUBJECTIVE:  Patient states \"I am doing better I guess, I still have leg pain but that comes and goes, I get prerna-horses bad occasionally in the (L) calf\". \"otherwise I feel pretty good\". \"I feel like my pelvic floor strength is improving a little bit\". though is scheduled for ESIs on 10/27/2020. She is scheduled for hemorrhoidectomy on 10/28/2020. Reports compliance with HEP.     OBJECTIVE:     Exercise (TE) Resistance/Repetitions Other comments          PF strengthening        Short hold: (1sec) 10 times       Long hold: (3sec) 10 times     Hook-lying TA + PF   [id number]    Bridge x10    Clamshell x10         LTR x10    Sciatic nerve glides Hook-lying x10                                                HEP:  Access Code: [email or web address]   URL: [email or web address]. [email or web address]/   Date: 10/07/2020   Prepared by: Sarah Cotton     Exercises   Supine Posterior Pelvic Tilt with Knee Rocks - 10 reps - 3 sets - 1x daily - 7x weekly   Soleus Stretch on Wall - 10 reps - 3 sets - 1x daily - 7x weekly   Supine Sciatic Nerve Glide - 10 reps - 3 sets - 1x daily - 7x weekly   Hooklying Transversus Abdominis Palpation - 10 reps - 3 sets - 1x daily - 7x weekly   Seated Pelvic Floor Contractions - 10 reps - 3 sets - 1x daily - 7x weekly   Seated Pelvic Floor Contractions - 10 reps - 3 sets - 1x daily - 7x weekly   Hooklying Bridge - 10 reps - 3 sets - 1x daily - 7x weekly   Clamshell - 10 reps - 3 sets - 1x daily - 7x weekly       Other Treatment:   TA:  Bladder re-training/education:     Bladder Diary: voiding 9-12x/day, 0 urinary leakage episodes, 1 BM (normal) per day    Voiding:educated to void every 2-3 hours. Dietary: patient educated on the \"4Cs\" to reduce bladder irritation and leakage. Other: , use of dilators, educated on lubrication and pain with trigger points, trigger point management. Manual Treatments:  (L) lumbo-pelvic PA mobilization without cavitation, pubic shotgun, (B) leg pull, breading (lumbar), neural tracing. Modalities:  --    Test/Measurements:  See eval         ASSESSMENT:   The patient performed above TE well with no increase in pain. The patient responded well to above manual with reduced complaints of pain and tightness following the manual therapy. Patient demonstrated good understanding of all education provided today. The patient will continue to increase compliance with HEP and follow up next week. No pain reported at end of PT session. Treatment/Activity Tolerance:   [x] Patient tolerated treatment well [] Patient limited by fatique  [] Patient limited by pain [] Patient limited by other medical complications  [] Other:     Goals:    Time Frame: 6 weeks. Rehab Potential: fair to good     Goals:  1. Patient will demonstrate independence with HEP to self-manage symptoms. 2. Patient will demonstrate improved pelvic floor strength to at least 4-/5 to improve functional strength. 3. Patient will improve pelvic floor endurance to at least 8 sec. to improve continence. 4. Patient will improve PFDI by at least 25 points to demonstrate improved pelvic floor functioning and quality of life. Short Term Goals:   3  weeks MET Not Met Long Term Goals:   6  weeks MET Not Met   Establish HEP [x]? ?  []??  Pt independent with HEP [x]? ?  []??    Pain 5 /10 or less [x]? ?  []??  Pain  3/10 or less PROGRESSING TOWARDS []??  []??    Increase identified strength deficits 1/3 grade PARTIALLY MET []??  []??  Increase strength to 4+/5 or greater  []? ?  []??    Achieve neutral alignment of pelvis / Resolve SI dysfunction if applicable, needs to further assessed [x]? ?  []??  Maintain neutral alignment / Normal SI mobility x 2 consecutive visits if applicable [x]? ?  []??    Gait WNL  [x]? ?  []??  Return to all ADLs/prior level of function PROGRESSING TOWARDS []??  []??    Decrease subjective complaint of radicular pain LLE by 50% [x]? ?  []??  No radicular pain into LLE  []??  []??          Plan: [x] Continue per plan of care [] Alter current plan (see comments)   [] Plan of care initiated [] Hold pending MD visit [] Discharge      Plan for Next Session:  Advance TE    Re-Certification Due Date:  Visit 12     Signature:  Elisa Haley, PT, DPT

## 2020-10-21 NOTE — PROGRESS NOTES
[facility] received a viral test for COVID-19. They were educated on isolation and quarantine as appropriate. For any symptoms, they were directed to seek care from their PCP, given contact information to establish with a doctor, directed to an urgent care or the emergency room.

## 2020-10-22 LAB — SARS-COV-2: NOT DETECTED

## 2020-10-22 NOTE — RESULT ENCOUNTER NOTE

## 2020-10-23 NOTE — PROGRESS NOTES
1.  Do not eat or drink anything after 12 midnight prior to surgery. This includes no water, chewing gum or mints. 2.  Take the following pills with a small sip of water on the morning of surgery . 3. Aspirin, Ibuprofen, Advil, Naproxen, Vitamin E and other Anti-inflammatory products should be stopped for 5 days before surgery or as directed by your physician. 4.  Check with your doctor regarding stopping Plavix, Coumadin, Lovenox, Fragmin or other blood thinners. 5.  Do not smoke and do not drink alcoholic beverages 24 hours prior to surgery. This includes NA Beer. 6.  You may brush your teeth and gargle the morning of surgery. DO NOT SWALLOW WATER.  7.  You MUST make arrangements for a responsible adult to take you home after your surgery. You will not be allowed to leave alone or drive yourself home. It is strongly suggested someone stay with you the first 24 hours. Your surgery will be cancelled if you do not have a ride home. 8.  A parent/legal guardian must accompany a child scheduled for surgery and plan to stay at the hospital until the child is discharged. Please do not bring other children with you. 9.  Please wear simple, loose fitting clothing to the hospital.  Maria Victoria Chen not bring valuables ( money, credit cards, checkbooks, etc.)  Do not wear any makeup (including no eye makeup) or nail polish on your fingers or toes. 10.  Do not wear any jewelry or piercing on the day of surgery. All body piercing jewelry must be removed. 11.  If you have dentures, they will be removed before going to the OR; we will provide you a container. If you wear contact lenses or glasses, they will be removed; please bring a case for them. 12.  Please see your family doctor/pediatrician for a history & physical and/or concerning medications. Bring any test results/reports from your physician's office the day of surgery. 15.  Remember to bring Blood Bank Bracelet to the hospital on the day of surgery.   14.  If you have a Living Will and Durable Power of  for Healthcare, please bring in a copy. 13.  Notify your Surgeon if you develop any illness between now and surgery time; cough, cold, fever, sore throat, nausea, vomiting, etc.  Please notify your surgeon if you experience dizziness, shortness of breath or blurred vision between now and the time of your surgery. 16.  DO NOT shave your operative site 96 hours (4 days) prior to surgery. For face and neck surgery, men may use an electric razor 48 hours (2 days) prior to surgery. 17. Shower the night before surgery and the morning of surgery with  an antibacterial soap   or  Chlorhexidine gluconate (for total joint replacement). To provide excellent care, visitors will be limited to two in a room at any given time. Please no children under the age of 15 in the surgical department.

## 2020-10-24 ENCOUNTER — ANESTHESIA EVENT (OUTPATIENT)
Dept: OPERATING ROOM | Age: 30
End: 2020-10-24
Payer: COMMERCIAL

## 2020-10-26 ASSESSMENT — LIFESTYLE VARIABLES: SMOKING_STATUS: 1

## 2020-10-26 NOTE — ANESTHESIA PRE PROCEDURE
Department of Anesthesiology  Preprocedure Note       Name:  Zenaida Portillo   Age:  31 y.o.  :  1990                                          MRN:  [id number]         Date:  10/26/2020      Surgeon: Piper Jones):  Sheri Woods MD    Procedure: Procedure(s): HEMORRHOIDECTOMY    Medications prior to admission:   Prior to Admission medications    Medication Sig Start Date End Date Taking? Authorizing Provider   norethindrone (MICRONOR) 0.35 MG tablet Take 0.35 mg by mouth daily 20   Historical Provider, MD       Current medications:    No current facility-administered medications for this encounter. Current Outpatient Medications   Medication Sig Dispense Refill    norethindrone (MICRONOR) 0.35 MG tablet Take 0.35 mg by mouth daily         Allergies:     Allergies   Allergen Reactions    Nickel     Percocet [Oxycodone-Acetaminophen] Itching       Problem List:    Patient Active Problem List   Diagnosis Code    History of abnormal cervical Pap smear Z87.42    LGSIL of cervix of undetermined significance R87.612    Hemorrhoids K64.9       Past Medical History:        Diagnosis Date    Asthma     as a child    Bacterial vaginosis     Chlamydia     Mental disorder     depression as an adolescent    Sciatic pain        Past Surgical History:        Procedure Laterality Date    EPIDURAL STEROID INJECTION Bilateral 2019    BILATERAL LUMBAR FOUR TRANSFORAMINAL EPIDURAL STEROID INJECTION SITE CONFIRMED BY FLUOROSCOPY [facility] Lind [facility] [address] N[address]A 2019    MICROLUMBAR DISCECTOMY L5-S1 performed by Lily Gonzalez MD at [address] [facility] DX/THER SBST INTRLMNR CRV/THRC W/IMG GDN Left 2018    LEFT LUMBAR FIVE SACRAL ONE TRANSFORAMINAL EPIDURAL STEROID INJECTION SITE CONFIRMED BY FLUOROSOCOPY performed by Radha Lind MD at [address] [facility]  2010       Social History:    Social History Tobacco Use    Smoking status: Current Every Day Smoker     Packs/day: 0.50    Smokeless tobacco: Never Used   Substance Use Topics    Alcohol use: No                                Ready to quit: Not Answered  Counseling given: Not Answered      Vital Signs (Current): There were no vitals filed for this visit. BP Readings from Last 3 Encounters:   10/05/20 126/74   08/31/20 122/74   12/20/19 98/63       NPO Status:                                                                                 BMI:   Wt Readings from Last 3 Encounters:   10/05/20 125 lb 9.6 oz (57 kg)   09/29/20 130 lb 1.1 oz (59 kg)   09/01/20 130 lb (59 kg)     There is no height or weight on file to calculate BMI.    CBC:   Lab Results   Component Value Date    WBC 11.4 11/23/2014    RBC 4.59 11/23/2014    HGB 13.6 11/23/2014    HCT 39.3 11/23/2014    MCV 85.5 11/23/2014    RDW 12.6 11/23/2014     11/23/2014       CMP:   Lab Results   Component Value Date     12/03/2013    K 3.7 12/03/2013     12/03/2013    CO2 29 12/03/2013    BUN 14 12/03/2013    CREATININE 0.8 12/03/2013    GFRAA >60 12/03/2013    AGRATIO 1.7 12/03/2013    LABGLOM >60 12/03/2013    GLUCOSE 86 12/03/2013    PROT 7.5 12/03/2013    CALCIUM 9.6 12/03/2013    BILITOT 0.54 12/03/2013    ALKPHOS 87 12/03/2013    AST 15 12/03/2013    ALT 10 12/03/2013       POC Tests: No results for input(s): POCGLU, POCNA, POCK, POCCL, POCBUN, POCHEMO, POCHCT in the last 72 hours.     Coags: No results found for: PROTIME, INR, APTT    HCG (If Applicable):   Lab Results   Component Value Date    PREGTESTUR Negative 02/18/2015        ABGs: No results found for: PHART, PO2ART, GKN3FYD, FZF1KFR, BEART, [id number]     Type & Screen (If Applicable):  Lab Results   Component Value Date    LABABO A 12/30/2011    [address] Positive 12/30/2011       Drug/Infectious Status (If Applicable):  No results found for: HIV, HEPCAB    COVID-19 Screening (If Applicable):   Lab Results   Component Value Date    COVID19 Not Detected 10/21/2020         Anesthesia Evaluation  Patient summary reviewed and Nursing notes reviewed no history of anesthetic complications:   Airway: Mallampati: II  TM distance: >3 FB   Neck ROM: full  Mouth opening: > = 3 FB Dental: normal exam         Pulmonary:   (+) asthma: current smoker                           Cardiovascular:Negative CV ROS                      Neuro/Psych:   (+) neuromuscular disease:, psychiatric history:            GI/Hepatic/Renal: Neg GI/Hepatic/Renal ROS       (-) GERD, liver disease and no renal disease       Endo/Other:    (+) blood dyscrasia: Factor V:., .    (-) diabetes mellitus               Abdominal:           Vascular: negative vascular ROS. Anesthesia Plan      general     ASA 2     (I discussed with the patient the risks and benefits of PIV, general anesthesia, IV Narcotics, PACU. All questions were answered the patient agrees with the plan)  Induction: intravenous. MIPS: Prophylactic antiemetics administered. Anesthetic plan and risks discussed with patient. Plan discussed with CRNA.                 Shmuel Gallegos MD   10/26/2020

## 2020-10-27 ENCOUNTER — HOSPITAL ENCOUNTER (OUTPATIENT)
Age: 30
Setting detail: OUTPATIENT SURGERY
Discharge: HOME | End: 2020-10-27
Attending: PHYSICAL MEDICINE & REHABILITATION | Admitting: PHYSICAL MEDICINE & REHABILITATION
Payer: COMMERCIAL

## 2020-10-27 VITALS
SYSTOLIC BLOOD PRESSURE: 109 MMHG | DIASTOLIC BLOOD PRESSURE: 72 MMHG | BODY MASS INDEX: 23.6 KG/M2 | RESPIRATION RATE: 16 BRPM | HEIGHT: 61 IN | WEIGHT: 125 LBS | OXYGEN SATURATION: 100 % | TEMPERATURE: 96 F | HEART RATE: 57 BPM

## 2020-10-27 LAB — PREGNANCY, URINE: NEGATIVE

## 2020-10-27 PROCEDURE — 84703 CHORIONIC GONADOTROPIN ASSAY: CPT

## 2020-10-27 PROCEDURE — 6360000002 HC RX W HCPCS: Performed by: PHYSICAL MEDICINE & REHABILITATION

## 2020-10-27 PROCEDURE — 6360000004 HC RX CONTRAST MEDICATION: Performed by: PHYSICAL MEDICINE & REHABILITATION

## 2020-10-27 PROCEDURE — 2580000003 HC RX 258: Performed by: PHYSICAL MEDICINE & REHABILITATION

## 2020-10-27 PROCEDURE — 7100000010 HC PHASE II RECOVERY - FIRST 15 MIN: Performed by: PHYSICAL MEDICINE & REHABILITATION

## 2020-10-27 PROCEDURE — 3600000002 HC SURGERY LEVEL 2 BASE: Performed by: PHYSICAL MEDICINE & REHABILITATION

## 2020-10-27 PROCEDURE — 2709999900 HC NON-CHARGEABLE SUPPLY: Performed by: PHYSICAL MEDICINE & REHABILITATION

## 2020-10-27 PROCEDURE — 2500000003 HC RX 250 WO HCPCS: Performed by: PHYSICAL MEDICINE & REHABILITATION

## 2020-10-27 PROCEDURE — 7100000011 HC PHASE II RECOVERY - ADDTL 15 MIN: Performed by: PHYSICAL MEDICINE & REHABILITATION

## 2020-10-27 RX ORDER — FENTANYL CITRATE 50 UG/ML
INJECTION, SOLUTION INTRAMUSCULAR; INTRAVENOUS
Status: DISCONTINUED
Start: 2020-10-27 | End: 2020-10-27 | Stop reason: HOSPADM

## 2020-10-27 RX ORDER — MIDAZOLAM HYDROCHLORIDE 1 MG/ML
INJECTION INTRAMUSCULAR; INTRAVENOUS PRN
Status: DISCONTINUED | OUTPATIENT
Start: 2020-10-27 | End: 2020-10-27 | Stop reason: ALTCHOICE

## 2020-10-27 RX ORDER — BUPIVACAINE HYDROCHLORIDE 5 MG/ML
INJECTION, SOLUTION EPIDURAL; INTRACAUDAL
Status: DISCONTINUED
Start: 2020-10-27 | End: 2020-10-27 | Stop reason: HOSPADM

## 2020-10-27 RX ORDER — SODIUM CHLORIDE, SODIUM LACTATE, POTASSIUM CHLORIDE, CALCIUM CHLORIDE 600; 310; 30; 20 MG/100ML; MG/100ML; MG/100ML; MG/100ML
INJECTION, SOLUTION INTRAVENOUS CONTINUOUS
Status: DISCONTINUED | OUTPATIENT
Start: 2020-10-27 | End: 2020-10-27 | Stop reason: HOSPADM

## 2020-10-27 RX ORDER — MIDAZOLAM HYDROCHLORIDE 1 MG/ML
INJECTION INTRAMUSCULAR; INTRAVENOUS
Status: DISCONTINUED
Start: 2020-10-27 | End: 2020-10-27 | Stop reason: HOSPADM

## 2020-10-27 RX ORDER — FENTANYL CITRATE 50 UG/ML
INJECTION, SOLUTION INTRAMUSCULAR; INTRAVENOUS PRN
Status: DISCONTINUED | OUTPATIENT
Start: 2020-10-27 | End: 2020-10-27 | Stop reason: ALTCHOICE

## 2020-10-27 RX ORDER — BETAMETHASONE SODIUM PHOSPHATE AND BETAMETHASONE ACETATE 3; 3 MG/ML; MG/ML
INJECTION, SUSPENSION INTRA-ARTICULAR; INTRALESIONAL; INTRAMUSCULAR; SOFT TISSUE
Status: DISCONTINUED
Start: 2020-10-27 | End: 2020-10-27 | Stop reason: HOSPADM

## 2020-10-27 RX ORDER — LIDOCAINE HYDROCHLORIDE 10 MG/ML
INJECTION, SOLUTION EPIDURAL; INFILTRATION; INTRACAUDAL; PERINEURAL PRN
Status: DISCONTINUED | OUTPATIENT
Start: 2020-10-27 | End: 2020-10-27 | Stop reason: ALTCHOICE

## 2020-10-27 RX ADMIN — SODIUM CHLORIDE, POTASSIUM CHLORIDE, SODIUM LACTATE AND CALCIUM CHLORIDE: 600; 310; 30; 20 INJECTION, SOLUTION INTRAVENOUS at 08:00

## 2020-10-27 ASSESSMENT — PAIN DESCRIPTION - DESCRIPTORS: DESCRIPTORS: SHARP

## 2020-10-27 ASSESSMENT — PAIN - FUNCTIONAL ASSESSMENT
PAIN_FUNCTIONAL_ASSESSMENT: 0-10
PAIN_FUNCTIONAL_ASSESSMENT: PREVENTS OR INTERFERES SOME ACTIVE ACTIVITIES AND ADLS

## 2020-10-27 NOTE — POST SEDATION
Sedation Post Procedure Note    Patient Name: Joelle Goetz   YOB: 1990  Room/Bed: EG OR/NONE  Medical Record Number: [id number]  Date: 10/27/2020   Time: 8:50 AM         Physicians/Assistants:  Sandi Kwong MD    Procedure Performed:  LESI    Post-Sedation Vital Signs:  Vitals:    10/27/20 0838   BP: 109/72   Pulse: 57   Resp: 16   Temp:    SpO2: 100%      Vital signs were reviewed and were stable after the procedure (see flow sheet for vitals)            Post-Sedation Exam: No changes           Complications: none    Electronically signed by Sandi Kwong MD on 10/27/2020 at 8:50 AM

## 2020-10-27 NOTE — H&P
drugs. Family History:   Family History   Problem Relation Age of Onset    High Blood Pressure Mother     Diabetes Maternal Grandmother     High Blood Pressure Maternal Grandmother     Diabetes Maternal Grandfather     Diabetes Paternal Grandmother     Diabetes Paternal Grandfather     Heart Disease Paternal Grandfather        Vitals: Blood pressure 132/81, pulse 72, temperature 96 °F (35.6 °C), temperature source Temporal, resp. rate 18, height 5' 1\" (1.549 m), weight 125 lb (56.7 kg), last menstrual period 10/04/2020, SpO2 97 %, unknown if currently breastfeeding. PHYSICAL EXAM:  HENT: Airway patent and reviewed  Cardiovascular: Normal rate, regular rhythm, normal heart sounds. Pulmonary/Chest: No wheezes. No rhonchi. No rales. Abdominal: Soft. Bowel sounds are normal. No distension. Extremities: Moves all extremities equally  Lumbar Spine: Painful range of motion, no midline tenderness       Diagnosis:Lumbar radiculopathy    Plan: Proceed with planned procedure    The patient was counseled at length about the risks of jayshree Covid-19 in the terrence-operative and post-operative states including the recovery window of their procedure. The patient was made aware that jayshree Covid-19 after a surgical procedure may worsen their prognosis for recovering from the virus and lend to a higher morbidity and or mortality risk. The patient was given the options of postponing their procedure. All of the risks, benefits, and alternatives were discussed. The patient does wish to proceed with the procedure. ASA CLASS:         []   I. Normal, healthy adult           [x]   II.  Mild systemic disease            []   III. Severe systemic disease      Mallampati: Mallampati Class II - (soft palate, fauces & uvula are visible)      Sedation plan:   [x]  Local              []  Minimal                  []  General anesthesia    Patient's condition acceptable for planned procedure/sedation.    Post Procedure Plan   Return to same level of care   ______________________     The risks and benefits as well as alternatives to the procedure have been discussed with the patient and or family. The patient and or next of kin understands and agrees to proceed.     Mckenzie Humphrey M.D.

## 2020-10-28 ENCOUNTER — TELEPHONE (OUTPATIENT)
Dept: SURGERY | Age: 30
End: 2020-10-28

## 2020-10-28 ENCOUNTER — ANESTHESIA (OUTPATIENT)
Dept: OPERATING ROOM | Age: 30
End: 2020-10-28
Payer: COMMERCIAL

## 2020-10-28 ENCOUNTER — HOSPITAL ENCOUNTER (OUTPATIENT)
Age: 30
Setting detail: OUTPATIENT SURGERY
Discharge: HOME | End: 2020-10-28
Attending: SURGERY | Admitting: SURGERY
Payer: COMMERCIAL

## 2020-10-28 VITALS
HEIGHT: 61 IN | DIASTOLIC BLOOD PRESSURE: 57 MMHG | HEART RATE: 63 BPM | BODY MASS INDEX: 23.98 KG/M2 | OXYGEN SATURATION: 99 % | RESPIRATION RATE: 14 BRPM | SYSTOLIC BLOOD PRESSURE: 105 MMHG | TEMPERATURE: 98 F | WEIGHT: 127 LBS

## 2020-10-28 VITALS
RESPIRATION RATE: 14 BRPM | TEMPERATURE: 98.6 F | SYSTOLIC BLOOD PRESSURE: 97 MMHG | DIASTOLIC BLOOD PRESSURE: 54 MMHG | OXYGEN SATURATION: 99 %

## 2020-10-28 LAB
HCT VFR BLD CALC: 41.2 % (ref 36–48)
HEMOGLOBIN: 13.6 G/DL (ref 12–16)
MCH RBC QN AUTO: 29.5 PG (ref 26–34)
MCHC RBC AUTO-ENTMCNC: 32.9 G/DL (ref 31–36)
MCV RBC AUTO: 89.6 FL (ref 80–100)
PDW BLD-RTO: 13.3 % (ref 12.4–15.4)
PLATELET # BLD: 202 K/UL (ref 135–450)
PMV BLD AUTO: 8.1 FL (ref 5–10.5)
PREGNANCY, URINE: NEGATIVE
RBC # BLD: 4.6 M/UL (ref 4–5.2)
WBC # BLD: 14.5 K/UL (ref 4–11)

## 2020-10-28 PROCEDURE — 7100000001 HC PACU RECOVERY - ADDTL 15 MIN: Performed by: SURGERY

## 2020-10-28 PROCEDURE — 2500000003 HC RX 250 WO HCPCS: Performed by: NURSE ANESTHETIST, CERTIFIED REGISTERED

## 2020-10-28 PROCEDURE — 2580000003 HC RX 258: Performed by: ANESTHESIOLOGY

## 2020-10-28 PROCEDURE — 2500000003 HC RX 250 WO HCPCS

## 2020-10-28 PROCEDURE — 3700000000 HC ANESTHESIA ATTENDED CARE: Performed by: SURGERY

## 2020-10-28 PROCEDURE — 6370000000 HC RX 637 (ALT 250 FOR IP): Performed by: ANESTHESIOLOGY

## 2020-10-28 PROCEDURE — 6360000002 HC RX W HCPCS: Performed by: NURSE ANESTHETIST, CERTIFIED REGISTERED

## 2020-10-28 PROCEDURE — 2500000003 HC RX 250 WO HCPCS: Performed by: SURGERY

## 2020-10-28 PROCEDURE — 84703 CHORIONIC GONADOTROPIN ASSAY: CPT

## 2020-10-28 PROCEDURE — 7100000011 HC PHASE II RECOVERY - ADDTL 15 MIN: Performed by: SURGERY

## 2020-10-28 PROCEDURE — 3600000012 HC SURGERY LEVEL 2 ADDTL 15MIN: Performed by: SURGERY

## 2020-10-28 PROCEDURE — 2580000003 HC RX 258: Performed by: SURGERY

## 2020-10-28 PROCEDURE — 85027 COMPLETE CBC AUTOMATED: CPT

## 2020-10-28 PROCEDURE — 3600000002 HC SURGERY LEVEL 2 BASE: Performed by: SURGERY

## 2020-10-28 PROCEDURE — 88304 TISSUE EXAM BY PATHOLOGIST: CPT

## 2020-10-28 PROCEDURE — 3700000001 HC ADD 15 MINUTES (ANESTHESIA): Performed by: SURGERY

## 2020-10-28 PROCEDURE — 6370000000 HC RX 637 (ALT 250 FOR IP): Performed by: SURGERY

## 2020-10-28 PROCEDURE — 7100000000 HC PACU RECOVERY - FIRST 15 MIN: Performed by: SURGERY

## 2020-10-28 PROCEDURE — 46255 REMOVE INT/EXT HEM 1 GROUP: CPT | Performed by: SURGERY

## 2020-10-28 PROCEDURE — 36415 COLL VENOUS BLD VENIPUNCTURE: CPT

## 2020-10-28 PROCEDURE — 2709999900 HC NON-CHARGEABLE SUPPLY: Performed by: SURGERY

## 2020-10-28 PROCEDURE — 2780000010 HC IMPLANT OTHER: Performed by: SURGERY

## 2020-10-28 PROCEDURE — 7100000010 HC PHASE II RECOVERY - FIRST 15 MIN: Performed by: SURGERY

## 2020-10-28 RX ORDER — HYDROCODONE BITARTRATE AND ACETAMINOPHEN 5; 325 MG/1; MG/1
1 TABLET ORAL EVERY 6 HOURS PRN
Qty: 28 TABLET | Refills: 0 | Status: SHIPPED | OUTPATIENT
Start: 2020-10-28 | End: 2020-11-04

## 2020-10-28 RX ORDER — SODIUM CHLORIDE 0.9 % (FLUSH) 0.9 %
10 SYRINGE (ML) INJECTION PRN
Status: DISCONTINUED | OUTPATIENT
Start: 2020-10-28 | End: 2020-10-28 | Stop reason: SDUPTHER

## 2020-10-28 RX ORDER — MEPERIDINE HYDROCHLORIDE 25 MG/ML
12.5 INJECTION INTRAMUSCULAR; INTRAVENOUS; SUBCUTANEOUS EVERY 5 MIN PRN
Status: DISCONTINUED | OUTPATIENT
Start: 2020-10-28 | End: 2020-10-28 | Stop reason: HOSPADM

## 2020-10-28 RX ORDER — FENTANYL CITRATE 50 UG/ML
INJECTION, SOLUTION INTRAMUSCULAR; INTRAVENOUS PRN
Status: DISCONTINUED | OUTPATIENT
Start: 2020-10-28 | End: 2020-10-28 | Stop reason: SDUPTHER

## 2020-10-28 RX ORDER — DIBUCAINE 1 G/100G
OINTMENT TOPICAL 4 TIMES DAILY PRN
Qty: 1 TUBE | Refills: 1 | Status: SHIPPED | OUTPATIENT
Start: 2020-10-28 | End: 2020-12-23

## 2020-10-28 RX ORDER — BUPIVACAINE HYDROCHLORIDE 5 MG/ML
INJECTION, SOLUTION EPIDURAL; INTRACAUDAL PRN
Status: DISCONTINUED | OUTPATIENT
Start: 2020-10-28 | End: 2020-10-28 | Stop reason: ALTCHOICE

## 2020-10-28 RX ORDER — MAGNESIUM HYDROXIDE 1200 MG/15ML
LIQUID ORAL CONTINUOUS PRN
Status: COMPLETED | OUTPATIENT
Start: 2020-10-28 | End: 2020-10-28

## 2020-10-28 RX ORDER — HYDROCODONE BITARTRATE AND ACETAMINOPHEN 5; 325 MG/1; MG/1
1 TABLET ORAL ONCE
Status: COMPLETED | OUTPATIENT
Start: 2020-10-28 | End: 2020-10-28

## 2020-10-28 RX ORDER — ONDANSETRON 2 MG/ML
4 INJECTION INTRAMUSCULAR; INTRAVENOUS EVERY 10 MIN PRN
Status: DISCONTINUED | OUTPATIENT
Start: 2020-10-28 | End: 2020-10-28 | Stop reason: HOSPADM

## 2020-10-28 RX ORDER — HYDROCODONE BITARTRATE AND ACETAMINOPHEN 5; 325 MG/1; MG/1
1 TABLET ORAL EVERY 6 HOURS PRN
Qty: 28 TABLET | Refills: 0 | Status: SHIPPED | OUTPATIENT
Start: 2020-10-28 | End: 2020-10-28 | Stop reason: HOSPADM

## 2020-10-28 RX ORDER — LABETALOL HYDROCHLORIDE 5 MG/ML
5 INJECTION, SOLUTION INTRAVENOUS EVERY 10 MIN PRN
Status: DISCONTINUED | OUTPATIENT
Start: 2020-10-28 | End: 2020-10-28 | Stop reason: HOSPADM

## 2020-10-28 RX ORDER — PROPOFOL 10 MG/ML
INJECTION, EMULSION INTRAVENOUS PRN
Status: DISCONTINUED | OUTPATIENT
Start: 2020-10-28 | End: 2020-10-28 | Stop reason: SDUPTHER

## 2020-10-28 RX ORDER — SODIUM CHLORIDE 0.9 % (FLUSH) 0.9 %
10 SYRINGE (ML) INJECTION EVERY 12 HOURS SCHEDULED
Status: DISCONTINUED | OUTPATIENT
Start: 2020-10-28 | End: 2020-10-28 | Stop reason: HOSPADM

## 2020-10-28 RX ORDER — GLYCOPYRROLATE 1 MG/5 ML
SYRINGE (ML) INTRAVENOUS PRN
Status: DISCONTINUED | OUTPATIENT
Start: 2020-10-28 | End: 2020-10-28 | Stop reason: SDUPTHER

## 2020-10-28 RX ORDER — DIBUCAINE 1 G/100G
OINTMENT TOPICAL PRN
Status: DISCONTINUED | OUTPATIENT
Start: 2020-10-28 | End: 2020-10-28 | Stop reason: ALTCHOICE

## 2020-10-28 RX ORDER — SODIUM CHLORIDE 0.9 % (FLUSH) 0.9 %
10 SYRINGE (ML) INJECTION EVERY 12 HOURS SCHEDULED
Status: DISCONTINUED | OUTPATIENT
Start: 2020-10-28 | End: 2020-10-28 | Stop reason: SDUPTHER

## 2020-10-28 RX ORDER — DEXAMETHASONE SODIUM PHOSPHATE 10 MG/ML
INJECTION INTRAMUSCULAR; INTRAVENOUS PRN
Status: DISCONTINUED | OUTPATIENT
Start: 2020-10-28 | End: 2020-10-28 | Stop reason: SDUPTHER

## 2020-10-28 RX ORDER — SODIUM CHLORIDE, SODIUM LACTATE, POTASSIUM CHLORIDE, CALCIUM CHLORIDE 600; 310; 30; 20 MG/100ML; MG/100ML; MG/100ML; MG/100ML
INJECTION, SOLUTION INTRAVENOUS CONTINUOUS
Status: DISCONTINUED | OUTPATIENT
Start: 2020-10-28 | End: 2020-10-28 | Stop reason: HOSPADM

## 2020-10-28 RX ORDER — HYDRALAZINE HYDROCHLORIDE 20 MG/ML
5 INJECTION INTRAMUSCULAR; INTRAVENOUS EVERY 10 MIN PRN
Status: DISCONTINUED | OUTPATIENT
Start: 2020-10-28 | End: 2020-10-28 | Stop reason: HOSPADM

## 2020-10-28 RX ORDER — ONDANSETRON 2 MG/ML
INJECTION INTRAMUSCULAR; INTRAVENOUS PRN
Status: DISCONTINUED | OUTPATIENT
Start: 2020-10-28 | End: 2020-10-28 | Stop reason: SDUPTHER

## 2020-10-28 RX ORDER — ROCURONIUM BROMIDE 10 MG/ML
INJECTION, SOLUTION INTRAVENOUS PRN
Status: DISCONTINUED | OUTPATIENT
Start: 2020-10-28 | End: 2020-10-28 | Stop reason: SDUPTHER

## 2020-10-28 RX ORDER — LIDOCAINE HYDROCHLORIDE 20 MG/ML
INJECTION, SOLUTION INFILTRATION; PERINEURAL PRN
Status: DISCONTINUED | OUTPATIENT
Start: 2020-10-28 | End: 2020-10-28 | Stop reason: SDUPTHER

## 2020-10-28 RX ORDER — MIDAZOLAM HYDROCHLORIDE 1 MG/ML
INJECTION INTRAMUSCULAR; INTRAVENOUS PRN
Status: DISCONTINUED | OUTPATIENT
Start: 2020-10-28 | End: 2020-10-28 | Stop reason: SDUPTHER

## 2020-10-28 RX ORDER — SODIUM CHLORIDE 0.9 % (FLUSH) 0.9 %
10 SYRINGE (ML) INJECTION PRN
Status: DISCONTINUED | OUTPATIENT
Start: 2020-10-28 | End: 2020-10-28 | Stop reason: HOSPADM

## 2020-10-28 RX ADMIN — ROCURONIUM BROMIDE 20 MG: 10 INJECTION, SOLUTION INTRAVENOUS at 09:00

## 2020-10-28 RX ADMIN — METRONIDAZOLE 500 MG: 500 INJECTION, SOLUTION INTRAVENOUS at 08:52

## 2020-10-28 RX ADMIN — DEXAMETHASONE SODIUM PHOSPHATE 10 MG: 10 INJECTION INTRAMUSCULAR; INTRAVENOUS at 09:09

## 2020-10-28 RX ADMIN — SODIUM CHLORIDE, POTASSIUM CHLORIDE, SODIUM LACTATE AND CALCIUM CHLORIDE: 600; 310; 30; 20 INJECTION, SOLUTION INTRAVENOUS at 07:32

## 2020-10-28 RX ADMIN — PROPOFOL 200 MG: 10 INJECTION, EMULSION INTRAVENOUS at 09:00

## 2020-10-28 RX ADMIN — FENTANYL CITRATE 50 MCG: 50 INJECTION, SOLUTION INTRAMUSCULAR; INTRAVENOUS at 09:10

## 2020-10-28 RX ADMIN — FAMOTIDINE 20 MG: 10 INJECTION INTRAVENOUS at 07:33

## 2020-10-28 RX ADMIN — HYDROCODONE BITARTRATE AND ACETAMINOPHEN 1 TABLET: 5; 325 TABLET ORAL at 10:46

## 2020-10-28 RX ADMIN — FENTANYL CITRATE 50 MCG: 50 INJECTION, SOLUTION INTRAMUSCULAR; INTRAVENOUS at 08:55

## 2020-10-28 RX ADMIN — LIDOCAINE HYDROCHLORIDE 3 ML: 20 INJECTION, SOLUTION INFILTRATION; PERINEURAL at 09:00

## 2020-10-28 RX ADMIN — Medication 0.2 MG: at 09:31

## 2020-10-28 RX ADMIN — MIDAZOLAM 2 MG: 1 INJECTION INTRAMUSCULAR; INTRAVENOUS at 08:53

## 2020-10-28 RX ADMIN — ONDANSETRON 4 MG: 2 INJECTION, SOLUTION INTRAMUSCULAR; INTRAVENOUS at 09:09

## 2020-10-28 RX ADMIN — FENTANYL CITRATE 50 MCG: 50 INJECTION, SOLUTION INTRAMUSCULAR; INTRAVENOUS at 09:00

## 2020-10-28 ASSESSMENT — PULMONARY FUNCTION TESTS
PIF_VALUE: 15
PIF_VALUE: 1
PIF_VALUE: 19
PIF_VALUE: 2
PIF_VALUE: 15
PIF_VALUE: 0
PIF_VALUE: 15
PIF_VALUE: 0
PIF_VALUE: 15
PIF_VALUE: 1
PIF_VALUE: 15
PIF_VALUE: 0
PIF_VALUE: 15
PIF_VALUE: 13
PIF_VALUE: 15
PIF_VALUE: 2
PIF_VALUE: 15
PIF_VALUE: 1
PIF_VALUE: 15
PIF_VALUE: 1
PIF_VALUE: 15
PIF_VALUE: 3
PIF_VALUE: 15
PIF_VALUE: 17
PIF_VALUE: 12
PIF_VALUE: 1

## 2020-10-28 ASSESSMENT — PAIN SCALES - GENERAL
PAINLEVEL_OUTOF10: 5
PAINLEVEL_OUTOF10: 3

## 2020-10-28 ASSESSMENT — PAIN DESCRIPTION - PAIN TYPE: TYPE: SURGICAL PAIN

## 2020-10-28 ASSESSMENT — PAIN DESCRIPTION - LOCATION: LOCATION: RECTUM

## 2020-10-28 ASSESSMENT — PAIN DESCRIPTION - DESCRIPTORS: DESCRIPTORS: PRESSURE

## 2020-10-28 ASSESSMENT — PAIN - FUNCTIONAL ASSESSMENT: PAIN_FUNCTIONAL_ASSESSMENT: 0-10

## 2020-10-28 NOTE — PROGRESS NOTES
[facility]    HPI:  Patient is 31y.o. year old female seen at request of Cinthya Lindquist MD.  She presents for evaluation of possible hemorrhoids. There has been bleeding. There has been painful perianal swelling. The symptoms have been occurring for  months. OTC treatments have been tried. Past Medical History:   Diagnosis Date    Asthma     as a child    Bacterial vaginosis     Chlamydia     Factor 5 Leiden mutation, heterozygous ([address]     Mental disorder     depression as an adolescent    Sciatic pain        Past Surgical History:   Procedure Laterality Date    EPIDURAL STEROID INJECTION Bilateral 6/4/2019    BILATERAL LUMBAR FOUR TRANSFORAMINAL EPIDURAL STEROID INJECTION SITE CONFIRMED BY FLUOROSCOPY performed by Lizabeth Richards MD at  [address] N/A 12/20/2019    MICROLUMBAR DISCECTOMY L5-S1 performed by Chen Munoz MD at [facility] Left 10/27/2020    LEFT LUMBAR FOUR LUMBAR FIVE TRANSFORAMINAL EPIDURAL STEROID INJECTION SITE CONFIRMED BY FLUOROSCOPY performed by Lizabeth Richards MD at [facility] DX/THER SBST INTRLMNR CRV/THRC W/IMG GDN Left 8/28/2018    LEFT LUMBAR FIVE SACRAL ONE TRANSFORAMINAL EPIDURAL STEROID INJECTION SITE CONFIRMED BY FLUOROSOCOPY performed by Lizabeth Richards MD at [address] EXTRACTION  October 2010       No current facility-administered medications on file prior to visit.       Current Outpatient Medications on File Prior to Visit   Medication Sig Dispense Refill    norethindrone (MICRONOR) 0.35 MG tablet Take 0.35 mg by mouth daily         Allergies   Allergen Reactions    Nickel     Percocet [Oxycodone-Acetaminophen] Itching       Social History     Socioeconomic History    Marital status: Single     Spouse name: Not on file    Number of children: Not on file    Years of education: Not on file    Highest education level: Not on file   Occupational History    Not on file   Social Needs    Financial resource strain: Not on file    Food insecurity     Worry: Not on file     Inability: Not on file    Transportation needs     Medical: Not on file     Non-medical: Not on file   Tobacco Use    Smoking status: Current Every Day Smoker     Packs/day: 0.50    Smokeless tobacco: Never Used   Substance and Sexual Activity    Alcohol use: No    Drug use: No    Sexual activity: Yes     Partners: Male   Lifestyle    Physical activity     Days per week: Not on file     Minutes per session: Not on file    Stress: Not on file   Relationships    Social connections     Talks on phone: Not on file     Gets together: Not on file     Attends Hinduism service: Not on file     Active member of club or organization: Not on file     Attends meetings of clubs or organizations: Not on file     Relationship status: Not on file    Intimate partner violence     Fear of current or ex partner: Not on file     Emotionally abused: Not on file     Physically abused: Not on file     Forced sexual activity: Not on file   Other Topics Concern    Not on file   Social History Narrative    Not on file       Family History   Problem Relation Age of Onset    High Blood Pressure Mother     Diabetes Maternal Grandmother     High Blood Pressure Maternal Grandmother     Diabetes Maternal Grandfather     Diabetes Paternal Grandmother     Diabetes Paternal Grandfather     Heart Disease Paternal Grandfather        ROS:  She reports no complaints related to the eyes, ears , nose throat or mouth. She denies weight loss. No chest pain. No SOB. No urinary complaints. No musculoskeletal complaints. No skin rashes. No neurologic deficits. No bleeding tendencies. GI complaints include perianal pain and swelling. Tristen Founds Physical Exam:  Vitals:    10/05/20 1449   BP: 126/74   Temp: 97.7 °F (36.5 °C)   127#    General:  Comfortable. No distress.    Eyes:  No scleral icterus  Ears: Normal  Nose:  Normal  Mouth:  Mucous membranes moist  Respiratory: Lungs CTA. No accessory muscle use. Heart:  Regular rhythm  Abdomen:  Soft. Non tender. Non distended. Musculoskeletal:  No abnormal movements. ROM extremities normal.  Skin:  No rashes. Neurologic:  No focal deficits. Psychiatric:  AAA. O x 3. Anorectal: Hemorrhoids present    ASSESSMENT:  1. Hemorrhoids, unspecified hemorrhoid type          PLAN:  The diagnosis and recommended procedure were explained. Questions answered. Prepare for hemorrhoid surgery. Greater than 50% visit spent counseling about diagnosis, treatment plan and expected post operative course. The patient was counseled at length about the risks of jayshree Covid-19 during their perioperative period and any recovery window from their procedure. The patient was made aware that jayshree Covid-19  may worsen their prognosis for recovering from their procedure  and lend to a higher morbidity and/or mortality risk. All material risks, benefits, and reasonable alternatives including postponing the procedure were discussed. The patient does wish to proceed with the procedure at this time.

## 2020-10-28 NOTE — TELEPHONE ENCOUNTER
Pts mother Naveen Kumari who is on [employer] called in asking about FMLA paperwork , she wanted to know if it had been completed and that it also needs to say that she is incapacitated or it won't be approved. She was also asking about how long she would be out for.  Please call Naveen Kumari back at [phone]

## 2020-10-28 NOTE — ANESTHESIA POSTPROCEDURE EVALUATION
Department of Anesthesiology  Postprocedure Note    Patient: Neil Quach  MRN: [id number]  YOB: 1990  Date of evaluation: 10/28/2020  Time:  1:00 PM     Procedure Summary     Date:  10/28/20 Room / Location:  [facility]    Anesthesia Start:  7476 Anesthesia Stop:  0930    Procedure:  HEMORRHOIDECTOMY (N/A Anus) Diagnosis:  (HEMORRHOIDS)    Surgeon:  Lan Arreola MD Responsible Provider:  Ten Jiang MD    Anesthesia Type:  general ASA Status:  2          Anesthesia Type: general    Sebastian Phase I: Sebastian Score: 10    Sebastian Phase II: Sebastian Score: 10    Last vitals: Reviewed and per EMR flowsheets.        Anesthesia Post Evaluation    Patient location during evaluation: PACU  Level of consciousness: awake  Airway patency: patent  Nausea & Vomiting: no nausea  Complications: no  Cardiovascular status: blood pressure returned to baseline  Respiratory status: acceptable  Hydration status: euvolemic

## 2020-10-28 NOTE — H&P
[facility] GENERAL SURGERY      The H&P was reviewed, the patient was examined, and no change has occurred in the patient's condition since the H&P was completed. The indications for the procedure were reviewed, and any questions were answered. I updated the progress note from 10/5/2020 which is the H&P.     Vitals:    10/28/20 0726   BP: (!) 99/58   Pulse: 58   Resp: 16   Temp: 97.8 °F (36.6 °C)   SpO2: 97%

## 2020-10-28 NOTE — BRIEF OP NOTE
Brief Postoperative Note      Patient: Bianka Menjivar  YOB: 1990  MRN: [id number]    Date of Procedure: 10/28/2020    Pre-Op Diagnosis: HEMORRHOIDS    Post-Op Diagnosis: Same       Procedure(s):   HEMORRHOIDECTOMY    Surgeon(s):  Rocio Thao MD    Assistant:  Surgical Assistant: Domenica Ma    Anesthesia: General    Estimated Blood Loss (mL): Minimal    Complications: None    Specimens:   ID Type Source Tests Collected by Time Destination   A :  Tissue Tissue SURGICAL PATHOLOGY Rocio Thao MD 10/28/2020 7676        Implants:  * No implants in log *      Drains: * No LDAs found *    Findings: As above    Electronically signed by Veronica Vaughn MD on 10/28/2020 at 9:23 AM

## 2020-10-28 NOTE — TELEPHONE ENCOUNTER
She has hemorrhoidectomy today. She does weekly pelvic floor physical therapy. She is asking if she should postpone this PT until after her post-op appt on 11/11/2020 or is she ok to continue PT as scheduled?

## 2020-10-28 NOTE — PROGRESS NOTES
NO CHANGE IN PHYSICAL ASSESSMENT; PT AND BOYFRIEND VERBALIZE UNDERSTANDING OF INSTRUCTIONS; PT DISCHARGED VIA W/C BY NURSE WITH BOYFRIEND IN STABLE CONDITION; STATES PAIN IS 5/10; WAS MEDICATED WITH VICODIN PRIOR TO DISCHARGE

## 2020-10-29 ENCOUNTER — TELEPHONE (OUTPATIENT)
Dept: SURGERY | Age: 30
End: 2020-10-29

## 2020-10-29 RX ORDER — LIDOCAINE 50 MG/G
OINTMENT TOPICAL
Qty: 1 TUBE | Refills: 1 | Status: SHIPPED | OUTPATIENT
Start: 2020-10-29 | End: 2020-12-23

## 2020-10-29 NOTE — OP NOTE
[address]                 [address] [facility], [place], operated by [facility]                                OPERATIVE REPORT    PATIENT NAME: Yari Campbell                   :        1990  MED REC NO:   [id number]                          ROOM:  ACCOUNT NO:   [de-identified]                           ADMIT DATE: 10/28/2020  PROVIDER:     Beth Harada, MD    DATE OF PROCEDURE:  10/28/2020    PREOPERATIVE DIAGNOSIS:  Hemorrhoids. POSTOPERATIVE DIAGNOSIS:  Hemorrhoids. OPERATION PERFORMED:  Hemorrhoidectomy. SURGEON:  Beth Harada, MD    ANESTHESIA:  General.    COMPLICATIONS:  None. ESTIMATED BLOOD LOSS:  Less than 50 mL. INDICATIONS FOR THE OPERATION:  This is a 62-year-old female with an  area of hemorrhoids that was intractable to pain and itching. She has  occasionally had bleeding as well. These have been present for a  longtime. Over-the-counter treatments were not providing relief. She  understood the risks and benefits and wanted to proceed. DESCRIPTION OF OPERATION:  The patient was brought to the operating  room. General anesthesia was induced. She was placed in lithotomy  position. She was prepped and draped. Local anesthetic was  infiltrated. A single column with internal and external hemorrhoids was  removed in the right anterolateral position. Hemostasis was obtained. Chromic suture was used to reapproximate the operative site in a running  locking fashion. Anal plug of Gelfoam, Surgicel, and dibucaine ointment  were placed. Dressings were placed. DISPOSITION:  The patient tolerated the procedure without any acute  complication.         Nikki Salter MD    D: 10/29/2020 13:24:51       T: 10/29/2020 15:15:31     [id number]  Job#: [id number]     Doc#: [id number]    CC:  Romario Conner MD

## 2020-10-29 NOTE — TELEPHONE ENCOUNTER
Patient sent a mohchi message, so I am sending it as a phone message. \"The cream that was prescribed is on back order at multiple pharmacies. Can you send something else electronically to [facility] [address] in [facility]?  \"

## 2020-11-03 NOTE — PROGRESS NOTES
Physical Therapy  Patient canceled her appointment on 11/4/2020 and 11/11/2020 due to having a procedure done and her MD would like her to wait on therapy till she has a follow up with him. Patient  Stated she will call and let us know when she is aloud to return.

## 2020-11-04 ENCOUNTER — HOSPITAL ENCOUNTER (OUTPATIENT)
Dept: PHYSICAL THERAPY | Age: 30
Discharge: HOME | End: 2020-11-04

## 2020-11-10 ENCOUNTER — OFFICE VISIT (OUTPATIENT)
Dept: ORTHOPEDIC SURGERY | Age: 30
End: 2020-11-10
Payer: COMMERCIAL

## 2020-11-10 VITALS — TEMPERATURE: 97.6 F | RESPIRATION RATE: 12 BRPM | HEIGHT: 61 IN | WEIGHT: 127 LBS | BODY MASS INDEX: 23.98 KG/M2

## 2020-11-10 PROCEDURE — 4004F PT TOBACCO SCREEN RCVD TLK: CPT | Performed by: PHYSICAL MEDICINE & REHABILITATION

## 2020-11-10 PROCEDURE — G8427 DOCREV CUR MEDS BY ELIG CLIN: HCPCS | Performed by: PHYSICAL MEDICINE & REHABILITATION

## 2020-11-10 PROCEDURE — G8420 CALC BMI NORM PARAMETERS: HCPCS | Performed by: PHYSICAL MEDICINE & REHABILITATION

## 2020-11-10 PROCEDURE — G8484 FLU IMMUNIZE NO ADMIN: HCPCS | Performed by: PHYSICAL MEDICINE & REHABILITATION

## 2020-11-10 PROCEDURE — 99213 OFFICE O/P EST LOW 20 MIN: CPT | Performed by: PHYSICAL MEDICINE & REHABILITATION

## 2020-11-10 NOTE — PROGRESS NOTES
at [facility] N/A 12/20/2019    MICROLUMBAR DISCECTOMY L5-S1 performed by Dianna Gamboa MD at [address] Left 10/27/2020    LEFT LUMBAR FOUR LUMBAR FIVE TRANSFORAMINAL EPIDURAL STEROID INJECTION SITE CONFIRMED BY FLUOROSCOPY performed by Leanne Doyle MD at [facility] DX/THER SBST INTRLMNR CRV/THRC W/IMG GDN Left 8/28/2018    LEFT LUMBAR FIVE SACRAL ONE TRANSFORAMINAL EPIDURAL STEROID INJECTION SITE CONFIRMED BY FLUOROSOCOPY performed by Leanne Doyle MD at [address] EXTRACTION  October 2010     Current Medications:     Current Outpatient Medications:     lidocaine (XYLOCAINE) 5 % ointment, Apply topically as needed. , Disp: 1 Tube, Rfl: 1    dibucaine 1 % perianal ointment, Place around the anus 4 times daily as needed for Pain, Disp: 1 Tube, Rfl: 1    norethindrone (MICRONOR) 0.35 MG tablet, Take 0.35 mg by mouth daily, Disp: , Rfl:   Allergies:  Nickel and Percocet [oxycodone-acetaminophen]  Social History:    reports that she has been smoking. She has been smoking about 0.50 packs per day. She has never used smokeless tobacco. She reports that she does not drink alcohol or use drugs. Family History:   Family History   Problem Relation Age of Onset    High Blood Pressure Mother     Diabetes Maternal Grandmother     High Blood Pressure Maternal Grandmother     Diabetes Maternal Grandfather     Diabetes Paternal Grandmother     Diabetes Paternal Grandfather     Heart Disease Paternal Grandfather        REVIEW OF SYSTEMS:   CONSTITUTIONAL: Denies unexplained weight loss, fevers, chills or fatigue  NEUROLOGICAL: Denies unsteady gait or progressive weakness  MUSCULOSKELETAL: Denies joint swelling or redness  GI: Denies nausea, vomiting, diarrhea   : Denies bowel or bladder issues       PHYSICAL EXAM:    Vitals: Temperature 97.6 °F (36.4 °C), resp.  rate 12, height 5' 1\" (1.549 m), weight 127 lb (57.6 kg), unknown if currently breastfeeding. GENERAL EXAM:  · General Apparence: Patient is adequately groomed with no evidence of malnutrition. · Psychiatric: Orientation: The patient is oriented to time, place and person. The patient's mood and affect are appropriate   · Vascular: Examination reveals no swelling and palpation reveals no tenderness in upper or lower extremities. Good capillary refill. · The lymphatic examination of the neck, axillae and groin reveals all areas to be without enlargement or induration  · Sensation is intact without deficit in the upper and lower extremities to light touch and pinprick  · Coordination of the upper and lower extremities are normal.  · Additional Examinations:  · RIGHT UPPER EXTREMITY:  Inspection/examination of the right upper extremity does not show any tenderness, deformity or injury. Range of motion is unremarkable and pain-free. There is no gross instability. There are no rashes, ulcerations or lesions. Strength and tone are normal. No atrophy or abnormal movements are noted. · LEFT UPPER EXTREMITY: Inspection/examination of the left upper extremity does not show any tenderness, deformity or injury. Range of motion is unremarkable and pain-free. There is no gross instability. There are no rashes, ulcerations or lesions. Strength and tone are normal. No atrophy or abnormal movements are noted. LUMBAR/SACRAL EXAMINATION:  · Inspection: Local inspection shows no step-off or bruising. Lumbar alignment is normal. No instability is noted. · Palpation:   No evidence of tenderness at the midline. Lumbar paraspinal tenderness: Mild L4/5 and L5/S1 tenderness  Bursal tenderness No tenderness bilaterally  There is no paraspinal spasm. · Range of Motion: limited by 25% in all planes due to pain  · Strength:   Strength testing is 5/5 in all muscle groups tested.   · Special Tests:   Straight leg raise + on left and crossed SLR negative  · Skin: There are no rashes, ulcerations or lesions. · Reflexes: Reflexes are symmetrically 2+ at the patellar and ankle tendons. Clonus absent bilaterally at the feet. · Gait & station: antalgic on the left and no ataxia  · Additional Examinations:  · RIGHT LOWER EXTREMITY: Inspection/examination of the right lower extremity does not show any tenderness, deformity or injury. Range of motion is normal and pain-free. There is no gross instability. There are no rashes, ulcerations or lesions. Strength and tone are normal. No atrophy or abnormal movements are noted. · LEFT LOWER EXTREMITY:  Inspection/examination of the left lower extremity does not show any tenderness, deformity or injury. Range of motion is normal and pain-free. There is no gross instability. There are no rashes, ulcerations or lesions. Strength and tone are normal. No atrophy or abnormal movements are noted. Diagnostic Testing:    MR Lumbar spine shows    1. Interval surgical intervention at the L4-5 and L5-S1 levels with left-sided hemilaminectomy    defects. The disc herniation at the L4-5 level as well as the disc extrusion at the L5-S1 level     are absent on today's examination. There is, however, a new 1.7cm left paracentral inferiorly    migrating disc extrusion at the L4-5 level which along with moderate facet hypertrophy and    underlying broad-based disc bulge contributing to abutment of bilateral descending L5 nerves    with possible compression on the left. 2. There is a broad-based disc bulge with leftward predominance and moderate facet hypertrophy    at the L5-S1 level with abutment of bilateral exiting L5 nerves.         Results for orders placed or performed during the hospital encounter of 10/28/20   CBC   Result Value Ref Range    WBC 14.5 (H) 4.0 - 11.0 K/uL    RBC 4.60 4.00 - 5.20 M/uL    Hemoglobin 13.6 12.0 - 16.0 g/dL    Hematocrit 41.2 36.0 - 48.0 %    MCV 89.6 80.0 - 100.0 fL    MCH 29.5 26.0 - 34.0 pg    MCHC 32.9 31.0 - 36.0 g/dL    RDW 13.3 12.4 - 15.4 %    Platelets 268 900 - 384 K/uL    MPV 8.1 5.0 - 10.5 fL   POC Pregnancy Urine Qual   Result Value Ref Range    Pregnancy, Urine Negative Detects HCG level >20 MIU/mL     Impression:       1. HNP (herniated nucleus pulposus), lumbar    2. Lumbar radiculopathy    3. Lumbar post-laminectomy syndrome        Plan:  Clinical Course: Above diagnoses are worsening    I discussed the diagnosis and the treatment options with Nikkie Finch today. In Summary:  The various treatment options were outlined and discussed with Nikkie Finch including:  Conservative care options: physical therapy, ice, medications, bracing, and activity modification. The indications for therapeutic injections. The indications for additional imaging/laboratory studies. The indications for (possible future) interventions. After considering the various options discussed, Nikkie Finch elected to pursue a course of treatment that includes the followin. Medications:  No further recommendations for new medications. 2. PT:  Encouraged to continue with Home exercise program.    3. Further studies: Referral to Dr Feroz Rodriguez      4. Interventional:  She reports no improvement with her lumbar LISA. 5. Follow up:  4-6 weeks      Nikkie Finch was instructed to call the office if her symptoms worsen or if new symptoms appear prior to the next scheduled visit. She is specifically instructed to contact the office between now & her scheduled appointment if she has concerns related to her condition or if she needs assistance in scheduling the above tests. She is welcome to call for an appointment sooner if she has any additional concerns or questions. Brianne Delacruz MD, BATOOL, [facility]  Board Certified in [address] [place] [address] Dr Certified and Fellowship Trained in [facility]             This dictation was performed with a verbal recognition program [facility]) and it was checked for errors. It is possible that there are still dictated errors within this office note. If so, please bring any errors to my attention for an addendum. All efforts were made to ensure that this office note is accurate.

## 2020-11-11 ENCOUNTER — OFFICE VISIT (OUTPATIENT)
Dept: SURGERY | Age: 30
End: 2020-11-11

## 2020-11-11 VITALS
SYSTOLIC BLOOD PRESSURE: 128 MMHG | DIASTOLIC BLOOD PRESSURE: 76 MMHG | HEIGHT: 61 IN | TEMPERATURE: 98.7 F | WEIGHT: 131 LBS | BODY MASS INDEX: 24.73 KG/M2

## 2020-11-11 PROCEDURE — 99024 POSTOP FOLLOW-UP VISIT: CPT | Performed by: SURGERY

## 2020-11-18 ENCOUNTER — HOSPITAL ENCOUNTER (OUTPATIENT)
Dept: PHYSICAL THERAPY | Age: 30
Setting detail: THERAPIES SERIES
Discharge: HOME | End: 2020-11-18
Payer: COMMERCIAL

## 2020-11-18 PROCEDURE — 97110 THERAPEUTIC EXERCISES: CPT

## 2020-11-18 PROCEDURE — 97530 THERAPEUTIC ACTIVITIES: CPT

## 2020-11-18 NOTE — FLOWSHEET NOTE
[facility]  [address] [place] [address] 67, [address] [facility] [address]  Phone: [phone]   Fax: [phone]      Physical Therapy Daily Treatment Note    Date:  2020    Patient Name:  Gisele Call    :  1990  MRN: [id number]  Restrictions/Precautions:  Universal   Medical/Treatment Diagnosis Information:  · Diagnosis: Pelvic Floor Dysfunction M62.89 HNP, LUMBAR M51.26, lumbar radiculopathy M54.16, lumbar stenosis with neurogenic claudication M48.062, spondylosis without myelopathy or radiculopathy, lumbar region M47.816  Treatment Diagnosis:  LBP with radiculopathy LLE, abnormality of gait  Insurance/Certification information:  PT Insurance Information: [facility]  Physician Information:  Referring Practitioner: Vince Solomon MD  Plan of care signed (Y/N):  Y  Visit# / total visits:  10/12     G-Code (if applicable):         PFDI: 136.4/300  Mod oswestry Score: 38/50    Medicare Cap (if applicable):  n/a = total amount used, updated 2020    Time in:   2:15pm      Timed Treatment: 45min. Total Treatment Time:  45min.  ____________________________________________________________________________________    Pain Level:    1/10 (L) calf, 3/10 back    SUBJECTIVE:  Patient states \"I am doing okay I guess, I am going back to the surgeon about my back because the injections didn't do anything, I was in so much pain later that day too\". \"my pelvic floor is a mess, I am bleeding a lot after intercourse and for days after\". Reports some compliance with HEP due to surgery. OBJECTIVE:     Exercise (TE) Resistance/Repetitions Other comments          PF strengthening        Short hold: (1sec) 10 times       Long hold: (3sec) 10 times     PF + hip ABD x10 w/ blue tband    PF + hip ADD x10 w/ ball squeeze         Hook-lying TA + PF   [id number].     Bridge x10    Clamshell x10         LTR x10    Sciatic nerve glides Hook-lying x10                     HEP:  Access Code: [email or web address]   URL: [email or web address]   Date: 11/18/2020   Prepared by: Jc Tavarez     Exercises   Supine Posterior Pelvic Tilt with Knee Rocks - 10 reps - 3 sets - 1x daily - 7x weekly   Soleus Stretch on Wall - 10 reps - 3 sets - 1x daily - 7x weekly   Supine Sciatic Nerve Glide - 10 reps - 3 sets - 1x daily - 7x weekly   Hooklying Transversus Abdominis Palpation - 10 reps - 3 sets - 1x daily - 7x weekly   Seated Pelvic Floor Contractions - 10 reps - 3 sets - 1x daily - 7x weekly   Seated Pelvic Floor Contractions - 10 reps - 3 sets - 1x daily - 7x weekly   Hooklying Bridge - 10 reps - 3 sets - 1x daily - 7x weekly   Clamshell - 10 reps - 3 sets - 1x daily - 7x weekly   Seated Pelvic Floor Contraction with Isometric Hip Adduction - 10 reps - 1 sets - 2-3sec. hold - 1x daily - 7x weekly   Seated Pelvic Floor Contraction with Hip Abduction and Resistance Loop - 10 reps - 1 sets - 2-3sec. hold - 1x daily - 7x weekly     Other Treatment:   TA:  Bladder re-training/education:     Bladder Diary: voiding 9-12x/day, 0 urinary leakage episodes, 1 BM (normal) per day    Voiding:educated to void every 2-3 hours. Dietary: patient educated on the \"4Cs\" to reduce bladder irritation and leakage. Other: , use of dilators, educated on lubrication and pain with trigger points, trigger point management. Manual Treatments:  (L) lumbo-pelvic PA mobilization without cavitation, pubic shotgun, (B) leg pull, breading (lumbar), neural tracing. Modalities:  --    Test/Measurements:  See eval         ASSESSMENT:   The patient performed above TE well with no increase in pain. The patient responded well exercise reviewed. The patient will see the surgeon on 11/23/2020 and follow up with PT on 12/7/2020. The patient will continue to increase compliance with HEP. No pain reported at end of PT session.          Treatment/Activity Tolerance:   [x] Patient tolerated treatment well [] Patient limited by alena  [] Patient limited by pain [] Patient limited by other medical complications  [] Other:     Goals:    Time Frame: 6 weeks. Rehab Potential: fair to good     Goals:  1. Patient will demonstrate independence with HEP to self-manage symptoms. 2. Patient will demonstrate improved pelvic floor strength to at least 4-/5 to improve functional strength. 3. Patient will improve pelvic floor endurance to at least 8 sec. to improve continence. 4. Patient will improve PFDI by at least 25 points to demonstrate improved pelvic floor functioning and quality of life. Short Term Goals:   3  weeks MET Not Met Long Term Goals:   6  weeks MET Not Met   Establish HEP [x]? ?  []??  Pt independent with HEP [x]? ?  []??    Pain 5 /10 or less [x]? ?  []??  Pain  3/10 or less PROGRESSING TOWARDS []??  []??    Increase identified strength deficits 1/3 grade PARTIALLY MET []??  []??  Increase strength to 4+/5 or greater  []? ?  []??    Achieve neutral alignment of pelvis / Resolve SI dysfunction if applicable, needs to further assessed [x]? ?  []??  Maintain neutral alignment / Normal SI mobility x 2 consecutive visits if applicable [x]? ?  []??    Gait WNL  [x]? ?  []??  Return to all ADLs/prior level of function PROGRESSING TOWARDS []??  []??    Decrease subjective complaint of radicular pain LLE by 50% [x]? ?  []??  No radicular pain into LLE  []??  []??          Plan: [x] Continue per plan of care [] Alter current plan (see comments)   [] Plan of care initiated [] Hold pending MD visit [] Discharge      Plan for Next Session:  Advance TE    Re-Certification Due Date:  Visit 12     Signature:  Laura Damon, PT, DPT

## 2020-11-23 ENCOUNTER — OFFICE VISIT (OUTPATIENT)
Dept: ORTHOPEDIC SURGERY | Age: 30
End: 2020-11-23
Payer: COMMERCIAL

## 2020-11-23 VITALS — HEIGHT: 61 IN | WEIGHT: 131 LBS | BODY MASS INDEX: 24.73 KG/M2

## 2020-11-23 PROCEDURE — G8420 CALC BMI NORM PARAMETERS: HCPCS | Performed by: ORTHOPAEDIC SURGERY

## 2020-11-23 PROCEDURE — 99213 OFFICE O/P EST LOW 20 MIN: CPT | Performed by: ORTHOPAEDIC SURGERY

## 2020-11-23 PROCEDURE — 4004F PT TOBACCO SCREEN RCVD TLK: CPT | Performed by: ORTHOPAEDIC SURGERY

## 2020-11-23 PROCEDURE — G8427 DOCREV CUR MEDS BY ELIG CLIN: HCPCS | Performed by: ORTHOPAEDIC SURGERY

## 2020-11-23 PROCEDURE — G8484 FLU IMMUNIZE NO ADMIN: HCPCS | Performed by: ORTHOPAEDIC SURGERY

## 2020-11-23 RX ORDER — HYDROCODONE BITARTRATE AND ACETAMINOPHEN 5; 325 MG/1; MG/1
1 TABLET ORAL EVERY 6 HOURS PRN
Status: ON HOLD | COMMUNITY
End: 2021-01-06 | Stop reason: HOSPADM

## 2020-11-23 NOTE — PROGRESS NOTES
History of present illness:   Ms. Bianka Menjivar  is a pleasant 31 y.o. female who is almost 1 year status post MLD left L4-5 and L5-S1 by me kindly referred by Dr. Maine Gan for consultation regarding her LBP and bilateral leg pain, left greater than right. She admits to weakness of her left ankle dorsiflexors and denies bowel or bladder dysfunction. She has tried epidural injections, physical therapy, chiropractic care, oral steroids, and gabapentin. She has continued to do the Alesha exercises she learned in physical therapy. Past medical history:  Her past medical history has been reviewed. Past surgical history:  Her past surgical history has been reviewed and is non-contributory to her present illness. Her medications and allergies were reviewed. Social history:  Her social history has been reviewed and she is a former smoker. Current Medication:  Current Outpatient Medications   Medication Sig Dispense Refill    lidocaine (XYLOCAINE) 5 % ointment Apply topically as needed. (Patient not taking: Reported on 11/11/2020) 1 Tube 1    dibucaine 1 % perianal ointment Place around the anus 4 times daily as needed for Pain (Patient not taking: Reported on 11/11/2020) 1 Tube 1    norethindrone (MICRONOR) 0.35 MG tablet Take 0.35 mg by mouth daily       No current facility-administered medications for this visit. Review of symptoms:  Patient's review of symptoms was reviewed and is significant for back pain and negative for recent weight loss, fatigue, chills, visual disturbances, blood in stool or urine, recent infection, chest pain, or shortness of breath. All other ROS were negative. Physical examination:  Ms. Rossi Richardson's most recent vitals: There is no height or weight on file to calculate BMI. General exam:  She is well-developed and well-nourished, is in obvious pain and alert and oriented to person, place, and time. She demonstrates appropriate mood and affect.  Her skin is warm and dry. Her gait is normal and she walks heel to toe without limp or instability. Back:  She stands with slight lumbar flexion. Her lumbar flexion, extension and lateral bending are moderately reduced with pain. She has mild tenderness over her lumbar spine without obvious muscle spasm. The skin over her lumbar spine is normal without a surgical scar. Lower extremities:  She has 4/5 strength of her left ankle dorsiflexors otherwise 5/5 motor strength of bilateral lower extremities. She has a positive right straight leg raise. Negative left straight leg raise. Deep tendon reflexes at knees and achilles are 2+. Sensation is intact to light touch L3 to S1 bilaterally. She has no clonus. Hip range of motion painless. Imaging:  I reviewed MRI images of her lumbar spine from 11/4/19 and 10/15/2020. They show a new left paracentral disc herniation L4-L5. Assessment:  Lumbar HNP L4-5 and L5-S1 with radiculopathy    Plan:  We discussed treatment options including observation, additional oral steroids, physical therapy, epidural injection and microlumbar discectomy. We discussed the risks, benefits, and alternatives to surgery including the risks of nerve or vessel injury, paralysis, spinal blood clot, spinal fluid leak, death, infection, need for additional spinal surgery, failure of surgery to alleviate her symptoms and worse symptoms following surgery. She understands these risks and wishes to proceed with surgery. Her questions were answered.

## 2020-11-24 NOTE — PROGRESS NOTES
[facility] SURGERY      S:   Patient presents s/p hemorrhoidectomy 2 weeks  ago. She reports improvement. O:   Comfortable         Incision site healing well. FINAL DIAGNOSIS:     Hemorrhoids, hemorrhoidectomy:   - Hemorrhoids.    BUCCA/KALYNCA               A:   S/P above    P:   Follow up as needed.

## 2020-12-01 ENCOUNTER — TELEPHONE (OUTPATIENT)
Dept: ORTHOPEDIC SURGERY | Age: 30
End: 2020-12-01

## 2020-12-02 ENCOUNTER — TELEPHONE (OUTPATIENT)
Dept: ORTHOPEDIC SURGERY | Age: 30
End: 2020-12-02

## 2020-12-04 ENCOUNTER — TELEPHONE (OUTPATIENT)
Dept: ORTHOPEDIC SURGERY | Age: 30
End: 2020-12-04

## 2020-12-04 NOTE — TELEPHONE ENCOUNTER
FAXED COMPLETED FMLA TO [facility] DEPT OF [address] [facility] KARLIE/REYES @ [phone]

## 2020-12-07 ENCOUNTER — HOSPITAL ENCOUNTER (OUTPATIENT)
Dept: PHYSICAL THERAPY | Age: 30
Setting detail: THERAPIES SERIES
Discharge: HOME | End: 2020-12-07
Payer: COMMERCIAL

## 2020-12-23 ENCOUNTER — TELEPHONE (OUTPATIENT)
Dept: ORTHOPEDIC SURGERY | Age: 30
End: 2020-12-23

## 2020-12-23 NOTE — TELEPHONE ENCOUNTER
CPT: 28584  AUTH: [employer]  DATE RANGE: NONE  INSURANCE: [employer]  NOTE: NONE Pt denies all s/s via translation  She is calm, friendly and cooperative  Superficial throughout conversation but polite

## 2020-12-31 ENCOUNTER — OFFICE VISIT (OUTPATIENT)
Dept: PRIMARY CARE CLINIC | Age: 30
End: 2020-12-31
Payer: COMMERCIAL

## 2020-12-31 DIAGNOSIS — Z01.818 PREOP TESTING: Primary | ICD-10-CM

## 2020-12-31 PROCEDURE — G8420 CALC BMI NORM PARAMETERS: HCPCS | Performed by: NURSE PRACTITIONER

## 2020-12-31 PROCEDURE — G8428 CUR MEDS NOT DOCUMENT: HCPCS | Performed by: NURSE PRACTITIONER

## 2020-12-31 PROCEDURE — 99211 OFF/OP EST MAY X REQ PHY/QHP: CPT | Performed by: NURSE PRACTITIONER

## 2021-01-02 LAB — SARS-COV-2, NAA: NOT DETECTED

## 2021-01-06 ENCOUNTER — ANESTHESIA EVENT (OUTPATIENT)
Dept: OPERATING ROOM | Age: 31
End: 2021-01-06
Payer: COMMERCIAL

## 2021-01-06 ENCOUNTER — HOSPITAL ENCOUNTER (OUTPATIENT)
Age: 31
Setting detail: OUTPATIENT SURGERY
Discharge: HOME | End: 2021-01-06
Attending: ORTHOPAEDIC SURGERY | Admitting: ORTHOPAEDIC SURGERY
Payer: COMMERCIAL

## 2021-01-06 ENCOUNTER — HOSPITAL ENCOUNTER (OUTPATIENT)
Dept: GENERAL RADIOLOGY | Age: 31
Discharge: HOME | End: 2021-01-06
Attending: ORTHOPAEDIC SURGERY
Payer: COMMERCIAL

## 2021-01-06 ENCOUNTER — ANESTHESIA (OUTPATIENT)
Dept: OPERATING ROOM | Age: 31
End: 2021-01-06
Payer: COMMERCIAL

## 2021-01-06 VITALS
BODY MASS INDEX: 24.55 KG/M2 | TEMPERATURE: 96.3 F | DIASTOLIC BLOOD PRESSURE: 79 MMHG | HEART RATE: 67 BPM | SYSTOLIC BLOOD PRESSURE: 112 MMHG | OXYGEN SATURATION: 99 % | HEIGHT: 61 IN | RESPIRATION RATE: 22 BRPM | WEIGHT: 130 LBS

## 2021-01-06 VITALS
OXYGEN SATURATION: 100 % | TEMPERATURE: 95.5 F | SYSTOLIC BLOOD PRESSURE: 101 MMHG | DIASTOLIC BLOOD PRESSURE: 70 MMHG | RESPIRATION RATE: 10 BRPM

## 2021-01-06 DIAGNOSIS — R52 PAIN: ICD-10-CM

## 2021-01-06 DIAGNOSIS — M51.26 DISPLACEMENT OF LUMBAR INTERVERTEBRAL DISC WITHOUT MYELOPATHY: Primary | ICD-10-CM

## 2021-01-06 LAB — PREGNANCY, URINE: NEGATIVE

## 2021-01-06 PROCEDURE — 3600000005 HC SURGERY LEVEL 5 BASE: Performed by: ORTHOPAEDIC SURGERY

## 2021-01-06 PROCEDURE — 3600000015 HC SURGERY LEVEL 5 ADDTL 15MIN: Performed by: ORTHOPAEDIC SURGERY

## 2021-01-06 PROCEDURE — 2720000010 HC SURG SUPPLY STERILE: Performed by: ORTHOPAEDIC SURGERY

## 2021-01-06 PROCEDURE — 7100000000 HC PACU RECOVERY - FIRST 15 MIN: Performed by: ORTHOPAEDIC SURGERY

## 2021-01-06 PROCEDURE — 6360000002 HC RX W HCPCS: Performed by: NURSE ANESTHETIST, CERTIFIED REGISTERED

## 2021-01-06 PROCEDURE — 6370000000 HC RX 637 (ALT 250 FOR IP): Performed by: ANESTHESIOLOGY

## 2021-01-06 PROCEDURE — 7100000001 HC PACU RECOVERY - ADDTL 15 MIN: Performed by: ORTHOPAEDIC SURGERY

## 2021-01-06 PROCEDURE — 6360000002 HC RX W HCPCS: Performed by: ANESTHESIOLOGY

## 2021-01-06 PROCEDURE — 2580000003 HC RX 258: Performed by: ORTHOPAEDIC SURGERY

## 2021-01-06 PROCEDURE — 72100 X-RAY EXAM L-S SPINE 2/3 VWS: CPT

## 2021-01-06 PROCEDURE — 2580000003 HC RX 258: Performed by: ANESTHESIOLOGY

## 2021-01-06 PROCEDURE — 3700000000 HC ANESTHESIA ATTENDED CARE: Performed by: ORTHOPAEDIC SURGERY

## 2021-01-06 PROCEDURE — 7100000011 HC PHASE II RECOVERY - ADDTL 15 MIN: Performed by: ORTHOPAEDIC SURGERY

## 2021-01-06 PROCEDURE — 84703 CHORIONIC GONADOTROPIN ASSAY: CPT

## 2021-01-06 PROCEDURE — 6360000002 HC RX W HCPCS: Performed by: ORTHOPAEDIC SURGERY

## 2021-01-06 PROCEDURE — 7100000010 HC PHASE II RECOVERY - FIRST 15 MIN: Performed by: ORTHOPAEDIC SURGERY

## 2021-01-06 PROCEDURE — 2500000003 HC RX 250 WO HCPCS: Performed by: NURSE ANESTHETIST, CERTIFIED REGISTERED

## 2021-01-06 PROCEDURE — 2709999900 HC NON-CHARGEABLE SUPPLY: Performed by: ORTHOPAEDIC SURGERY

## 2021-01-06 PROCEDURE — 2500000003 HC RX 250 WO HCPCS: Performed by: ORTHOPAEDIC SURGERY

## 2021-01-06 PROCEDURE — 3700000001 HC ADD 15 MINUTES (ANESTHESIA): Performed by: ORTHOPAEDIC SURGERY

## 2021-01-06 RX ORDER — BUPIVACAINE HYDROCHLORIDE AND EPINEPHRINE 2.5; 5 MG/ML; UG/ML
INJECTION, SOLUTION EPIDURAL; INFILTRATION; INTRACAUDAL; PERINEURAL PRN
Status: DISCONTINUED | OUTPATIENT
Start: 2021-01-06 | End: 2021-01-06 | Stop reason: ALTCHOICE

## 2021-01-06 RX ORDER — LIDOCAINE HYDROCHLORIDE 10 MG/ML
2 INJECTION, SOLUTION INFILTRATION; PERINEURAL
Status: DISCONTINUED | OUTPATIENT
Start: 2021-01-06 | End: 2021-01-06 | Stop reason: HOSPADM

## 2021-01-06 RX ORDER — HYDROCODONE BITARTRATE AND ACETAMINOPHEN 5; 325 MG/1; MG/1
1 TABLET ORAL EVERY 4 HOURS PRN
Status: DISCONTINUED | OUTPATIENT
Start: 2021-01-06 | End: 2021-01-06 | Stop reason: HOSPADM

## 2021-01-06 RX ORDER — HYDRALAZINE HYDROCHLORIDE 20 MG/ML
5 INJECTION INTRAMUSCULAR; INTRAVENOUS EVERY 10 MIN PRN
Status: DISCONTINUED | OUTPATIENT
Start: 2021-01-06 | End: 2021-01-06 | Stop reason: HOSPADM

## 2021-01-06 RX ORDER — METHOCARBAMOL 750 MG/1
750 TABLET, FILM COATED ORAL 3 TIMES DAILY
Qty: 30 TABLET | Refills: 0 | Status: SHIPPED | OUTPATIENT
Start: 2021-01-06 | End: 2021-01-16

## 2021-01-06 RX ORDER — DIPHENHYDRAMINE HYDROCHLORIDE 50 MG/ML
12.5 INJECTION INTRAMUSCULAR; INTRAVENOUS
Status: DISCONTINUED | OUTPATIENT
Start: 2021-01-06 | End: 2021-01-06 | Stop reason: HOSPADM

## 2021-01-06 RX ORDER — HYDROCODONE BITARTRATE AND ACETAMINOPHEN 5; 325 MG/1; MG/1
2 TABLET ORAL PRN
Status: COMPLETED | OUTPATIENT
Start: 2021-01-06 | End: 2021-01-06

## 2021-01-06 RX ORDER — MEPERIDINE HYDROCHLORIDE 50 MG/ML
12.5 INJECTION INTRAMUSCULAR; INTRAVENOUS; SUBCUTANEOUS EVERY 5 MIN PRN
Status: DISCONTINUED | OUTPATIENT
Start: 2021-01-06 | End: 2021-01-06 | Stop reason: HOSPADM

## 2021-01-06 RX ORDER — PROPOFOL 10 MG/ML
INJECTION, EMULSION INTRAVENOUS PRN
Status: DISCONTINUED | OUTPATIENT
Start: 2021-01-06 | End: 2021-01-06 | Stop reason: SDUPTHER

## 2021-01-06 RX ORDER — LABETALOL HYDROCHLORIDE 5 MG/ML
5 INJECTION, SOLUTION INTRAVENOUS EVERY 10 MIN PRN
Status: DISCONTINUED | OUTPATIENT
Start: 2021-01-06 | End: 2021-01-06 | Stop reason: HOSPADM

## 2021-01-06 RX ORDER — DEXAMETHASONE SODIUM PHOSPHATE 4 MG/ML
INJECTION, SOLUTION INTRA-ARTICULAR; INTRALESIONAL; INTRAMUSCULAR; INTRAVENOUS; SOFT TISSUE PRN
Status: DISCONTINUED | OUTPATIENT
Start: 2021-01-06 | End: 2021-01-06 | Stop reason: SDUPTHER

## 2021-01-06 RX ORDER — SODIUM CHLORIDE 0.9 % (FLUSH) 0.9 %
10 SYRINGE (ML) INJECTION PRN
Status: CANCELLED | OUTPATIENT
Start: 2021-01-06

## 2021-01-06 RX ORDER — PROMETHAZINE HYDROCHLORIDE 25 MG/ML
6.25 INJECTION, SOLUTION INTRAMUSCULAR; INTRAVENOUS
Status: DISCONTINUED | OUTPATIENT
Start: 2021-01-06 | End: 2021-01-06 | Stop reason: HOSPADM

## 2021-01-06 RX ORDER — PROMETHAZINE HYDROCHLORIDE 25 MG/1
12.5 TABLET ORAL EVERY 6 HOURS PRN
Status: CANCELLED | OUTPATIENT
Start: 2021-01-06

## 2021-01-06 RX ORDER — LIDOCAINE HYDROCHLORIDE ANHYDROUS AND DEXTROSE MONOHYDRATE .4; 5 G/100ML; G/100ML
2.3 INJECTION, SOLUTION INTRAVENOUS CONTINUOUS
Status: DISCONTINUED | OUTPATIENT
Start: 2021-01-06 | End: 2021-01-06 | Stop reason: HOSPADM

## 2021-01-06 RX ORDER — LIDOCAINE HYDROCHLORIDE 20 MG/ML
INJECTION, SOLUTION INFILTRATION; PERINEURAL PRN
Status: DISCONTINUED | OUTPATIENT
Start: 2021-01-06 | End: 2021-01-06 | Stop reason: SDUPTHER

## 2021-01-06 RX ORDER — FENTANYL CITRATE 50 UG/ML
INJECTION, SOLUTION INTRAMUSCULAR; INTRAVENOUS PRN
Status: DISCONTINUED | OUTPATIENT
Start: 2021-01-06 | End: 2021-01-06 | Stop reason: SDUPTHER

## 2021-01-06 RX ORDER — HYDROCODONE BITARTRATE AND ACETAMINOPHEN 5; 325 MG/1; MG/1
1 TABLET ORAL PRN
Status: COMPLETED | OUTPATIENT
Start: 2021-01-06 | End: 2021-01-06

## 2021-01-06 RX ORDER — MORPHINE SULFATE 2 MG/ML
2 INJECTION, SOLUTION INTRAMUSCULAR; INTRAVENOUS EVERY 5 MIN PRN
Status: DISCONTINUED | OUTPATIENT
Start: 2021-01-06 | End: 2021-01-06 | Stop reason: HOSPADM

## 2021-01-06 RX ORDER — SODIUM CHLORIDE, SODIUM LACTATE, POTASSIUM CHLORIDE, CALCIUM CHLORIDE 600; 310; 30; 20 MG/100ML; MG/100ML; MG/100ML; MG/100ML
INJECTION, SOLUTION INTRAVENOUS CONTINUOUS
Status: DISCONTINUED | OUTPATIENT
Start: 2021-01-06 | End: 2021-01-06 | Stop reason: HOSPADM

## 2021-01-06 RX ORDER — ONDANSETRON 2 MG/ML
4 INJECTION INTRAMUSCULAR; INTRAVENOUS
Status: DISCONTINUED | OUTPATIENT
Start: 2021-01-06 | End: 2021-01-06 | Stop reason: HOSPADM

## 2021-01-06 RX ORDER — HYDROCODONE BITARTRATE AND ACETAMINOPHEN 5; 325 MG/1; MG/1
1 TABLET ORAL EVERY 6 HOURS PRN
Qty: 28 TABLET | Refills: 0 | Status: SHIPPED | OUTPATIENT
Start: 2021-01-06 | End: 2021-01-13

## 2021-01-06 RX ORDER — MORPHINE SULFATE 2 MG/ML
1 INJECTION, SOLUTION INTRAMUSCULAR; INTRAVENOUS EVERY 5 MIN PRN
Status: DISCONTINUED | OUTPATIENT
Start: 2021-01-06 | End: 2021-01-06 | Stop reason: HOSPADM

## 2021-01-06 RX ORDER — ROCURONIUM BROMIDE 10 MG/ML
INJECTION, SOLUTION INTRAVENOUS PRN
Status: DISCONTINUED | OUTPATIENT
Start: 2021-01-06 | End: 2021-01-06 | Stop reason: SDUPTHER

## 2021-01-06 RX ORDER — MIDAZOLAM HYDROCHLORIDE 1 MG/ML
INJECTION INTRAMUSCULAR; INTRAVENOUS PRN
Status: DISCONTINUED | OUTPATIENT
Start: 2021-01-06 | End: 2021-01-06 | Stop reason: SDUPTHER

## 2021-01-06 RX ORDER — ONDANSETRON 2 MG/ML
4 INJECTION INTRAMUSCULAR; INTRAVENOUS EVERY 6 HOURS PRN
Status: CANCELLED | OUTPATIENT
Start: 2021-01-06

## 2021-01-06 RX ORDER — ONDANSETRON 4 MG/1
4 TABLET, FILM COATED ORAL 3 TIMES DAILY PRN
Qty: 15 TABLET | Refills: 0 | Status: SHIPPED | OUTPATIENT
Start: 2021-01-06

## 2021-01-06 RX ORDER — SODIUM CHLORIDE 0.9 % (FLUSH) 0.9 %
10 SYRINGE (ML) INJECTION EVERY 12 HOURS SCHEDULED
Status: CANCELLED | OUTPATIENT
Start: 2021-01-06

## 2021-01-06 RX ADMIN — FENTANYL CITRATE 50 MCG: 50 INJECTION, SOLUTION INTRAMUSCULAR; INTRAVENOUS at 11:17

## 2021-01-06 RX ADMIN — METHOCARBAMOL 500 G: 100 INJECTION INTRAMUSCULAR; INTRAVENOUS at 11:40

## 2021-01-06 RX ADMIN — MIDAZOLAM HYDROCHLORIDE 2 MG: 2 INJECTION, SOLUTION INTRAMUSCULAR; INTRAVENOUS at 11:13

## 2021-01-06 RX ADMIN — DEXAMETHASONE SODIUM PHOSPHATE 8 MG: 4 INJECTION, SOLUTION INTRAMUSCULAR; INTRAVENOUS at 11:28

## 2021-01-06 RX ADMIN — SODIUM CHLORIDE, SODIUM LACTATE, POTASSIUM CHLORIDE, AND CALCIUM CHLORIDE: .6; .31; .03; .02 INJECTION, SOLUTION INTRAVENOUS at 11:16

## 2021-01-06 RX ADMIN — FENTANYL CITRATE 50 MCG: 50 INJECTION, SOLUTION INTRAMUSCULAR; INTRAVENOUS at 11:20

## 2021-01-06 RX ADMIN — CEFAZOLIN SODIUM 2 G: 10 INJECTION, POWDER, FOR SOLUTION INTRAVENOUS at 11:16

## 2021-01-06 RX ADMIN — HYDROCODONE BITARTRATE AND ACETAMINOPHEN 1 TABLET: 5; 325 TABLET ORAL at 13:10

## 2021-01-06 RX ADMIN — LIDOCAINE HYDROCHLORIDE 60 MG: 20 INJECTION, SOLUTION INFILTRATION; PERINEURAL at 11:20

## 2021-01-06 RX ADMIN — ROCURONIUM BROMIDE 40 MG: 10 SOLUTION INTRAVENOUS at 11:20

## 2021-01-06 RX ADMIN — LIDOCAINE HYDROCHLORIDE ANHYDROUS AND DEXTROSE MONOHYDRATE 2.3 MG/MIN: .4; 5 INJECTION, SOLUTION INTRAVENOUS at 11:28

## 2021-01-06 RX ADMIN — HYDROMORPHONE HYDROCHLORIDE 0.5 MG: 1 INJECTION, SOLUTION INTRAMUSCULAR; INTRAVENOUS; SUBCUTANEOUS at 12:42

## 2021-01-06 RX ADMIN — PROPOFOL 140 MG: 10 INJECTION, EMULSION INTRAVENOUS at 11:20

## 2021-01-06 ASSESSMENT — PULMONARY FUNCTION TESTS
PIF_VALUE: 17
PIF_VALUE: 18
PIF_VALUE: 17
PIF_VALUE: 17
PIF_VALUE: 18
PIF_VALUE: 19
PIF_VALUE: 17
PIF_VALUE: 16
PIF_VALUE: 17
PIF_VALUE: 2
PIF_VALUE: 1
PIF_VALUE: 16
PIF_VALUE: 0
PIF_VALUE: 16
PIF_VALUE: 2
PIF_VALUE: 16
PIF_VALUE: 18
PIF_VALUE: 18
PIF_VALUE: 17
PIF_VALUE: 18
PIF_VALUE: 4
PIF_VALUE: 16
PIF_VALUE: 17
PIF_VALUE: 16
PIF_VALUE: 5
PIF_VALUE: 0
PIF_VALUE: 16
PIF_VALUE: 17
PIF_VALUE: 16
PIF_VALUE: 17
PIF_VALUE: 20

## 2021-01-06 ASSESSMENT — PAIN DESCRIPTION - LOCATION: LOCATION: BACK

## 2021-01-06 ASSESSMENT — PAIN DESCRIPTION - PAIN TYPE: TYPE: SURGICAL PAIN

## 2021-01-06 ASSESSMENT — PAIN SCALES - GENERAL: PAINLEVEL_OUTOF10: 5

## 2021-01-06 ASSESSMENT — PAIN DESCRIPTION - FREQUENCY: FREQUENCY: CONTINUOUS

## 2021-01-06 ASSESSMENT — PAIN DESCRIPTION - ONSET: ONSET: GRADUAL

## 2021-01-06 NOTE — PROGRESS NOTES
IV removed. Dressed self with assistance from [place]. Taking po well. Pain Medication starting to work- Pain at 4/10. Dressing dry and intact with small amount of swelling.

## 2021-01-06 NOTE — ANESTHESIA PRE PROCEDURE
Department of Anesthesiology  Preprocedure Note       Name:  Ardena Lesch   Age:  31 y.o.  :  1990                                          MRN:  [id number]         Date:  2021      Surgeon: Ernestine Nichols):  Marian Rodriguez MD    Procedure: Procedure(s): MICROLUMBAR DISCECTOMY RE-EXPLORATION L4-5    Medications prior to admission:   Prior to Admission medications    Medication Sig Start Date End Date Taking? Authorizing Provider   HYDROcodone-acetaminophen (NORCO) 5-325 MG per tablet Take 1 tablet by mouth every 6 hours as needed for Pain for up to 7 days. Intended supply: 5 days. Take lowest dose possible to manage pain 21 Yes Eunice Prater PA-C   methocarbamol (ROBAXIN-750) 750 MG tablet Take 1 tablet by mouth 3 times daily for 10 days 21 Yes Eunice Prater PA-C   ondansetron (ZOFRAN) 4 MG tablet Take 1 tablet by mouth 3 times daily as needed for Nausea or Vomiting 21  Yes Eunice Prater PA-C       Current medications:    Current Facility-Administered Medications   Medication Dose Route Frequency Provider Last Rate Last Admin    ceFAZolin (ANCEF) 2 g in dextrose 5 % 100 mL IVPB  2 g Intravenous On Call to Corby Ross MD        lidocaine 1 % injection 2 mL  2 mL Intradermal Once PRN Juana Pastor MD        lactated ringers infusion   Intravenous Continuous Juana Pastor MD        methocarbamol (ROBAXIN) 500 mg in dextrose 5 % 100 mL IVPB  500 mg Intravenous Greg Chambers MD        lidocaine (CARDIAC INFUSION) 2 g in dextrose 5% 500 mL infusion  2.3 mg/min Intravenous Continuous Margaux Cary MD        HYDROcodone-acetaminophen [facility]) 5-325 MG per tablet 1 tablet  1 tablet Oral Q4H PRN Eunice Prater PA-C           Allergies:     Allergies   Allergen Reactions    Nickel     Percocet [Oxycodone-Acetaminophen] Itching       Problem List:    Patient Active Problem List   Diagnosis Code  History of abnormal cervical Pap smear Z87.42    LGSIL of cervix of undetermined significance R87.612    Hemorrhoids K64.9    Displacement of lumbar intervertebral disc without myelopathy M51.26       Past Medical History:        Diagnosis Date    Asthma     as a child    Bacterial vaginosis     Chlamydia     Depression     as adolescent    Factor 5 Leiden mutation, heterozygous ([address]     Sciatic pain        Past Surgical History:        Procedure Laterality Date    EPIDURAL STEROID INJECTION Bilateral 6/4/2019    BILATERAL LUMBAR FOUR TRANSFORAMINAL EPIDURAL STEROID INJECTION SITE CONFIRMED BY FLUOROSCOPY performed by Roxanna Zepeda MD at [place] [address] N/A 10/28/2020    HEMORRHOID SURGERY N/A 10/28/2020    HEMORRHOIDECTOMY performed by Yisel Galindo MD at [facility] N/A 12/20/2019    MICROLUMBAR DISCECTOMY L5-S1 performed by Micky Lazaro MD at [facility] Left 10/27/2020    LEFT LUMBAR FOUR LUMBAR FIVE TRANSFORAMINAL EPIDURAL STEROID INJECTION SITE CONFIRMED BY FLUOROSCOPY performed by Roxanna Zepeda MD at [facility] DX/THER SBST INTRLMNR CRV/THRC W/IMG GDN Left 8/28/2018    LEFT LUMBAR FIVE SACRAL ONE TRANSFORAMINAL EPIDURAL STEROID INJECTION SITE CONFIRMED BY FLUOROSOCOPY performed by Roxanna Zepeda MD at [address] EXTRACTION  October 2010       Social History:    Social History     Tobacco Use    Smoking status: Current Every Day Smoker     Packs/day: 0.50     Start date: 12/23/2003    Smokeless tobacco: Never Used   Substance Use Topics    Alcohol use: No     Alcohol/week: 0.0 - 3.0 standard drinks                                Ready to quit: Not Answered  Counseling given: Not Answered      Vital Signs (Current):   Vitals:    12/23/20 1427 01/06/21 0958   BP:  128/70   Pulse:  65   Resp:  18   Temp:  98.8 °F (37.1 °C)   SpO2:  99% Weight: 130 lb (59 kg) 130 lb (59 kg)   Height: 5' 1\" (1.549 m) 5' 1\" (1.549 m)                                              BP Readings from Last 3 Encounters:   01/06/21 128/70   11/11/20 128/76   10/28/20 (!) 97/54       NPO Status: Time of last liquid consumption: 2300                        Time of last solid consumption: 1800                        Date of last liquid consumption: 01/05/21                        Date of last solid food consumption: 01/05/21    BMI:   Wt Readings from Last 3 Encounters:   01/06/21 130 lb (59 kg)   11/23/20 131 lb (59.4 kg)   11/11/20 131 lb (59.4 kg)     Body mass index is 24.56 kg/m². CBC:   Lab Results   Component Value Date    WBC 14.5 10/28/2020    RBC 4.60 10/28/2020    HGB 13.6 10/28/2020    HCT 41.2 10/28/2020    MCV 89.6 10/28/2020    RDW 13.3 10/28/2020     10/28/2020       CMP:   Lab Results   Component Value Date     12/03/2013    K 3.7 12/03/2013     12/03/2013    CO2 29 12/03/2013    BUN 14 12/03/2013    CREATININE 0.8 12/03/2013    GFRAA >60 12/03/2013    AGRATIO 1.7 12/03/2013    LABGLOM >60 12/03/2013    GLUCOSE 86 12/03/2013    PROT 7.5 12/03/2013    CALCIUM 9.6 12/03/2013    BILITOT 0.54 12/03/2013    ALKPHOS 87 12/03/2013    AST 15 12/03/2013    ALT 10 12/03/2013       POC Tests: No results for input(s): POCGLU, POCNA, POCK, POCCL, POCBUN, POCHEMO, POCHCT in the last 72 hours.     Coags: No results found for: PROTIME, INR, APTT    HCG (If Applicable):   Lab Results   Component Value Date    PREGTESTUR Negative 01/06/2021        ABGs: No results found for: PHART, PO2ART, AZM5CNG, XEK3GVM, BEART, B0YNEWGJ     Type & Screen (If Applicable):  Lab Results   Component Value Date    LABABO A 12/30/2011    [address] Positive 12/30/2011       Drug/Infectious Status (If Applicable):  No results found for: HIV, HEPCAB    COVID-19 Screening (If Applicable):   Lab Results   Component Value Date    COVID19 NOT DETECTED 12/31/2020 Anesthesia Evaluation   no history of anesthetic complications:   Airway: Mallampati: II  TM distance: >3 FB   Neck ROM: full  Mouth opening: > = 3 FB Dental: normal exam         Pulmonary:   (+) asthma:                            Cardiovascular:Negative CV ROS                      Neuro/Psych:   (+) neuromuscular disease:, psychiatric history:depression/anxiety             GI/Hepatic/Renal: Neg GI/Hepatic/Renal ROS            Endo/Other: Negative Endo/Other ROS                    Abdominal:           Vascular: negative vascular ROS. Anesthesia Plan      general     ASA 2     (Pt agrees to risks, benefits and alternatives of GETA. Questions answered. Willing to proceed with plan.)  Induction: intravenous. Anesthetic plan and risks discussed with patient.                       Surekha Jefferson MD   1/6/2021

## 2021-01-06 NOTE — OP NOTE
Operative Note      Patient: Mayo Mchugh  YOB: 1990  MRN: [id number]    Date of Procedure: 1/6/2021    Pre-Op Diagnosis: RECURRENT DISPLACEMENT OF LUMBAR DISC    Post-Op Diagnosis: Same       Procedure(s): MICROLUMBAR DISCECTOMY RE-EXPLORATION L4-5    Surgeon(s):  Dia Martinez MD    Assistant:   Surgical Assistant: Francine Dickinson  Physician Assistant: Linda Man PA-C    Anesthesia: General    Estimated Blood Loss (mL): less than 50     Complications: None    Specimens:   * No specimens in log *    Implants:  * No implants in log *      Drains: * No LDAs found *    Findings:  HNP    Detailed Description of Procedure:     INDICATIONS FOR SURGERY:   Mayo Mchugh is a 31 y.o. female with back and left leg pain. She is 1 year s/p MLD left L4-5. The symptoms failed to respond to conservative intervention. An MRI scan was performed and this showed evidence of a recurrent disk herniation at the L4-5 level on the left. Having failed conservative management and experiencing persistent symptoms, the patient elected to proceed ahead with the surgical option of microdiskectomy at L4-5. DETAILS OF PROCEDURE:  The patient was brought to the operating room and placed under general anesthesia. The patient was then placed prone on a Ted table. All bony prominences were inspected and padded prior to sterile draping. Using a #10 blade knife, the skin was incised in her midline scar and monopolar cautery was used to dissect through the subcutaneous tissue to open the fascia and reflect the paraspinal muscles laterally exposing the L4-5 interspace on the left side. I peeled scar tissue from the L4 and L5 lamina. The microscope was then brought into the field and used to assist with performing a microsurgical diskectomy at this level. Using the Midas Sreekanth drill, I burred down the hemilamina of  L4 and a more significant portion of the inferior L5 lamina.   I exposed beyond the pedicle of L5 where the disk herniation was located. The underlying ligamentum flavum and scar tissue were freed with the micronerve hook from the underlying dura and removed with the 3 mm punch laterally, exposing the lateral dural tube and takeoff of the L5 nerve root. The L5 nerve root was noted to be dorsally displaced. I gently retracted the nerve root medially and found a subligamentous disk herniation directly on the takeoff of the nerve roots. An incision was made in the ligamentous tissue and the fragment immediately presented itself for removal.  The pituitary forceps were used to further explore the interspace and additional loose fragments were removed. The wound was copiously irrigated with antibiotic solution. 0.5% Marcaine in subcutaneous tissues for analgesia. The fascia was then reapproximated using interrupted 0 Vicryl sutures and interrupted 3-0 Vicryl sutures followed by a 4-0 Vicryl subcuticular suture were used to reapproximate the subcuticular layer. A sterile dressing was then applied. The patient was extubated in the operating room and transferred to the recovery room in stable condition. There were no complications. Shashank Antunez M.D.     Electronically signed by Meghan Kraft MD on 1/6/2021 at 12:18 PM

## 2021-01-06 NOTE — H&P
I have reviewed the history and physical and examined the patient and find no relevant changes. I have reviewed with the patient and/or family the risks, benefits, and alternatives to the procedure. The patient was counseled at length about the risks of jayshree Covid-19 during their perioperative period and any recovery window from their procedure. The patient was made aware that jayshree Covid-19  may worsen their prognosis for recovering from their procedure  and lend to a higher morbidity and/or mortality risk. All material risks, benefits, and reasonable alternatives including postponing the procedure were discussed. The patient does wish to proceed with the procedure at this time. Yoav Brooke MD  1/6/2021 No intake/output data recorded.

## 2021-01-06 NOTE — ANESTHESIA POSTPROCEDURE EVALUATION
Department of Anesthesiology  Postprocedure Note    Patient: Rebeca Bravo  MRN: [id number]  YOB: 1990  Date of evaluation: 1/6/2021  Time:  4:48 PM     Procedure Summary     Date: 01/06/21 Room / Location: [facility]    Anesthesia Start: 1116 Anesthesia Stop: 7469    Procedure: MICROLUMBAR DISCECTOMY RE-EXPLORATION L4-5 (N/A Back) Diagnosis:       Displacement of lumbar intervertebral disc without myelopathy      (RECURRENT DISPLACEMENT OF LUMBAR [address])    Surgeons: Balbir Hinds MD Responsible Provider: Ana Maria Dong MD    Anesthesia Type: general ASA Status: 2          Anesthesia Type: general    Sebastian Phase I: Sebastian Score: 10    Sebastian Phase II: Sebastian Score: 10    Last vitals: Reviewed and per EMR flowsheets. Anesthesia Post Evaluation    Patient location during evaluation: PACU  Patient participation: complete - patient participated  Level of consciousness: awake and alert  Airway patency: patent  Nausea & Vomiting: no nausea and no vomiting  Complications: no  Cardiovascular status: blood pressure returned to baseline  Respiratory status: acceptable  Hydration status: euvolemic  Comments: VSS on transfer to phase 2 recovery. No anesthetic complications.

## 2021-01-06 NOTE — LETTER
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  Microlumbar laminotomy re-exploration partial discectomy L4-5 [id number]

## 2021-01-19 ENCOUNTER — TELEPHONE (OUTPATIENT)
Dept: ORTHOPEDIC SURGERY | Age: 31
End: 2021-01-19

## 2021-01-21 ENCOUNTER — OFFICE VISIT (OUTPATIENT)
Dept: ORTHOPEDIC SURGERY | Age: 31
End: 2021-01-21

## 2021-01-21 VITALS — WEIGHT: 130 LBS | BODY MASS INDEX: 24.55 KG/M2 | HEIGHT: 61 IN

## 2021-01-21 DIAGNOSIS — M51.26 RECURRENT HERNIATION OF LUMBAR DISC: Primary | ICD-10-CM

## 2021-01-21 PROCEDURE — 99024 POSTOP FOLLOW-UP VISIT: CPT | Performed by: ORTHOPAEDIC SURGERY

## 2021-01-21 NOTE — PROGRESS NOTES
Ms. Lauren Hatch returns today 2 weeks s/p MLD left L4-5. She reports marked improvement of her leg symptoms. Her incisions show no signs of infection. She has a normal gait and 5/5 strength of her ankle DFs/PFs, bilaterally. Her sensation is intact from L3 to S1 bilaterally. I cautioned her to avoid lifting more than 10 pounds, bending and impact type aerobic exercise for 8 week after surgery, then slowly increase her activity over the next month.

## 2021-02-16 ENCOUNTER — TELEPHONE (OUTPATIENT)
Dept: ORTHOPEDIC SURGERY | Age: 31
End: 2021-02-16

## 2021-02-17 ENCOUNTER — TELEPHONE (OUTPATIENT)
Dept: ORTHOPEDIC SURGERY | Age: 31
End: 2021-02-17

## 2021-02-17 ENCOUNTER — OFFICE VISIT (OUTPATIENT)
Dept: ORTHOPEDIC SURGERY | Age: 31
End: 2021-02-17

## 2021-02-17 VITALS — HEIGHT: 61 IN | WEIGHT: 130 LBS | BODY MASS INDEX: 24.55 KG/M2

## 2021-02-17 DIAGNOSIS — M51.26 DISPLACEMENT OF LUMBAR INTERVERTEBRAL DISC WITHOUT MYELOPATHY: Primary | ICD-10-CM

## 2021-02-17 PROCEDURE — 99024 POSTOP FOLLOW-UP VISIT: CPT | Performed by: PHYSICIAN ASSISTANT

## 2021-02-17 RX ORDER — METHYLPREDNISOLONE 4 MG/1
TABLET ORAL
Qty: 1 KIT | Refills: 0 | Status: SHIPPED | OUTPATIENT
Start: 2021-02-17 | End: 2021-02-23

## 2021-02-17 NOTE — TELEPHONE ENCOUNTER
FAXED UPDATED F[facility]A TO [facility] Dulce Etienne @ [phone]

## 2021-03-02 ENCOUNTER — TELEPHONE (OUTPATIENT)
Dept: ORTHOPEDIC SURGERY | Age: 31
End: 2021-03-02

## 2021-03-02 DIAGNOSIS — Z98.890 S/P DISKECTOMY: ICD-10-CM

## 2021-03-02 DIAGNOSIS — Z98.890 S/P DISKECTOMY: Primary | ICD-10-CM

## 2021-03-02 DIAGNOSIS — M51.26 RECURRENT HERNIATION OF LUMBAR DISC: Primary | ICD-10-CM

## 2021-03-02 RX ORDER — HYDROCODONE BITARTRATE AND ACETAMINOPHEN 5; 325 MG/1; MG/1
1 TABLET ORAL EVERY 8 HOURS PRN
Qty: 21 TABLET | Refills: 0 | Status: SHIPPED | OUTPATIENT
Start: 2021-03-02 | End: 2021-03-09

## 2021-03-02 NOTE — TELEPHONE ENCOUNTER
Patient is requesting refill of pain medication. Please advise. Patient was only given her original script.

## 2021-03-02 NOTE — TELEPHONE ENCOUNTER
General Question     Subject: She was calling to let Steffi know how she is feeling.   Also she needs a return to work note    Patient and /or Facility Request: pt    Contact Number: [phone]

## 2021-03-02 NOTE — TELEPHONE ENCOUNTER
Spoke with patient and she continues to have pain. Placed orders for MRI. She also needed an updated work note with restrictions.

## 2021-03-02 NOTE — TELEPHONE ENCOUNTER
Hydrocodone refill today with a new directions of 1 p.o. every 8 hours as needed for pain. If she continues to have significant pain we should obtain a new MRI.

## 2021-03-19 ENCOUNTER — TELEPHONE (OUTPATIENT)
Dept: ORTHOPEDIC SURGERY | Age: 31
End: 2021-03-19

## 2021-03-19 DIAGNOSIS — M51.26 RECURRENT HERNIATION OF LUMBAR DISC: ICD-10-CM

## 2021-03-19 DIAGNOSIS — Z98.890 S/P DISKECTOMY: Primary | ICD-10-CM

## 2021-03-19 NOTE — TELEPHONE ENCOUNTER
Spoke with patient and let her know that MRI is denied. She will have to go to PT. I put in order and her restrictions at this time.

## 2021-03-23 ENCOUNTER — TELEPHONE (OUTPATIENT)
Dept: ORTHOPEDIC SURGERY | Age: 31
End: 2021-03-23

## 2021-07-06 NOTE — PROGRESS NOTES
Discharge instructions given to pt and pt mother verbalized understanding, states pain is at a tolerable level, no numbness or weakness noted, pt wheeled out to car without complications
Pt arrived to PACU,A&O,no c/o pain and/or numbness or tingling, vitals stable, site unremarkable
What Type Of Note Output Would You Prefer (Optional)?: Bullet Format
Is This A New Presentation, Or A Follow-Up?: Follow Up Rash
Additional History: Seeking refill on triamcinolone

## 2021-12-08 ENCOUNTER — HOSPITAL ENCOUNTER (OUTPATIENT)
Dept: PHYSICAL THERAPY | Age: 31
Setting detail: THERAPIES SERIES
Discharge: HOME | End: 2021-12-08
Payer: COMMERCIAL

## 2021-12-08 PROCEDURE — 97110 THERAPEUTIC EXERCISES: CPT

## 2021-12-08 PROCEDURE — 97161 PT EVAL LOW COMPLEX 20 MIN: CPT

## 2021-12-08 NOTE — PROGRESS NOTES
[facility]  [address]     [place], [facility]  Phone: [phone]       Fax:   [phone]       Physical Therapy Evaluation and Certification    Dear Referring Practitioner: Dr. Liz Goodman,    We had the pleasure of evaluating the following patient for physical therapy services at [address] [facility]. A summary of our findings can be found in the initial assessment below. This includes our plan of care. If you have any questions or concerns regarding these findings, please do not hesitate to contact me at the office phone number above. Thank you for the referral.       Physician/Provider Signature:_______________________________Date:__________________  By signing above (or electronic signature), therapist's plan is approved by physician      Patient: Wendy Obrien   : 1990   MRN: [id number]    Referring Practitioner: Dr. Liz Goodman        Evaluation Date: 2021        Medical Diagnosis Information:  Diagnosis: low back pain with radiculopathy   Treatment Diagnosis: LBP with radiculopathy                                           Insurance information: PT Insurance Information: caresource     Precautions/ Contra-indications: 2 previous back surgeries L4-5, L 5-S1 (no fusions, no hardware)  Latex Allergy:  [x]NO      []YES     Preferred Language for Healthcare:   [x]English       []other:    C-SSRS Triggered by Intake questionnaire (Past 2 wk assessment):   [x] No, Questionnaire did not trigger screening. [x] Yes, Patient intake triggered further evaluation      [x] C-SSRS Screening completed  [x] PCP notified via Plan of Care  [] Emergency services notified      SUBJECTIVE FINDINGS       History of Present Illness:      Pt presents with C/o chronic back pain. Pt has had 2 surgeries in the past  and .  Pt  felt second surgery was not a success, had terrible pain 2 weeks after, they tried to get MRI, but insurance wanted 6 weeks of PT. Pt was not cleared to do PT yet by surgeon so at 3 month nhung, pt was referred to therapy but did not attend, had too much going on. Pain continued and worsened over past year, pt referred herself to [facility], saw Dr. Mariel Galaviz, had xrays, bone spurs, and he ordered MRI but pt needs therapy first.  Pt not scheduled to return to MD at this time, will contact MD after therapy. 10/2020 (prior to second surgery)      CONCLUSION:   1. Interval surgical intervention at the L4-5 and L5-S1 levels with left-sided hemilaminectomy    defects. The disc herniation at the L4-5 level as well as the disc extrusion at the L5-S1 level    are absent on today's examination. There is, however, a new 1.7cm left paracentral inferiorly    migrating disc extrusion at the L4-5 level which along with moderate facet hypertrophy and    underlying broad-based disc bulge contributing to abutment of bilateral descending L5 nerves    with possible compression on the left. 2. There is a broad-based disc bulge with leftward predominance and moderate facet hypertrophy    at the L5-S1 level with abutment of bilateral exiting L5 nerves. Pain   Intermittent LB pain, sometimes L, sometimes R, intermittent pain down L LE to toes,  Intermittent N/T also in leg.   Frequent charley horses in legs at night    Patient reports pain is  2/10 pain at present and  9/10 pain at its worst.  Pain increases with : everything, prolonged sitting and standing, sleeps upright on couch, pain with rolling over,         Decreases with: tucked in fetal position, heat, aleve,    Pt. reports pain with coughing, sneezing and laughing:    [x]Yes   []No   []NA   Pt. reports bowel and bladder changes:      [x]Yes   []No   []NA  Sometimes has trouble urination  Pt. reports knee/hip/ankle buckling:      []Yes   []No   []NA     Current Functional Limitations:   [x]Yes   []No  Functional complaints: not being able to sleep in bed, physical activity, play with kids, picking up kids  PLOF:   [x]No functional limitations   []Pre-existing limitations :   Pt's sleep is affected?    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     Sport/recreational activities:  Pt likes to bowl with kids, play putt putt     Patient goal for therapy:  \" Patient goals : to help alleviate some of the symptoms \"         OBJECTIVE FINDINGS       Gait/Steps/Balance       [x]Gait WNL                             []Deviations on a level linoleum surface include:      Posture: [x] WNL  []Forward head   []Forward flexed trunk    []Scoliosis   []Decreased WB on   []R   []L     []Other:       Quick Tests/Functional Myotome Tests:   Heel Walk (L4):      []NT  [x]Able to perform WNL   []Unable to perform         Toe Walk (S1):       []NT  []Able to perform WNL   [x]Unable to perform        Lumbar Range of MotionTesting      [x]All WFL except as marked below  ROM  Deficits         *denotes pain AROM  (% Decreased) COMMENTS   Flexion 25    Extension 25 min pain    Sidebending Left 25    Sidebending Right 25    Rotation Left   [x]Seated  []Standing       Rotation Right  [x]Seated  []Standing         Special Tests- Standing   (C)= Jose Carloska's Criteria: 3/4 Positive tests or (+) Fortins sign with two additional (+) tests  Special Test Abnormal Findings   Jennifer's Sign                (C)                  [x]Neg   []Pos R   []Pos L      Standing Landmarks [x]Iliac Crests Equal   []R High  []L High  [x]PSIS Equal   []R High  []L High  [x]ASIS Equal   []R High  []L High     []Difficult to assess due to body habitus    Standing Flexion Test  (C) [x]Neg   []Pos R   []Pos L      March Test (Gillet's Test) []Neg   []Pos R   []Pos L      Sacral Sulci Test  [x]Neg   []Pos R   []Pos L          Deep Tendon Reflexes     [x]All reflexes WNL (2+) or negative except as marked below  Abnormal Reflex Findings Left Right Comments   Olegario's Reflex      Biceps (C5,6)      Brachioradialis (C6)      Triceps (C7)      Quadriceps (L3,4)   3+  []Pendular x 3 R  [x]Pendular x 3 L   Achilles (S1,2)      Ankle clonus , # of beats      Babinski's reflex  NT NT        Dermatomal Sensation   [x]All dermatomes WFL for light touch except as marked below  Abnormal Dermatome Findings Left Right   Anterior groin, 2-3 inches below ASIS (L1-L2)     Middle third anterior thigh (L3)     Patella and med malleolus (L4)     Fibular head and dorsum of foot (L5)     Lateral side and plantar surface of foot (S1)     Medial aspect of posterior thigh (S2)                   Range of Motion/Strength Testing-Myotomes    [x] Blank box below indicates item is NOT TESTED        Range Tested MMT/ Resisted PROM AROM Comments   *denotes pain Left Right Left Right Left Right    Hip Flexion  (L1-2) 4/5 4/5        Hip Extension          Hip Abduction  (L5) 3-/5 4/5        Hip Adduction  (L3)          Hip IR          Hip ER          Knee Flexion  (L5,S1) 4/5 4+/5        Knee Extension  (L3,4) 4-/5 4+/5        Ankle Dorsiflex  (L4) 4+/5 4+/5        Ankle Plantarflex  (S1,2) 3-/5 4/5        Ankle Inversion          Ankle Eversion          Great Toe Ext  (L5) 3-/5 4+/5          [x] Not Tested  Trunk Strength     Trunk Extensors     Gluteals     Abdominals         Flexibility    [] All tested [facility]    [] Deficits indicated as follows:    Muscle Abnormal Findings   Hip flexors/Mic  [x]Decreased R   [x]Decreased L      Hamstrings  Degrees in 90/90 [x]Decreased R   [x]Decreased L     Right:  -20            Left:   -40   Gastrocs   [x]Decreased R   [x]Decreased L      Obers/TFL/ITB   []Decreased R   []Decreased L      Piriformis    []Decreased R   []Decreased L      Other:    []Decreased R   []Decreased L          Palpation     Patient reported tenderness with palpation:  []Yes :   [x]No       [x] Patient history, allergies, meds reviewed. Medical chart reviewed. See intake form. Review Of Systems (ROS):  [x]Performed Review of systems (Integumentary, CardioPulmonary, Neurological) by intake and observation. Intake form has been scanned into medical record. Patient has been instructed to contact their primary care physician regarding ROS issues if not already being addressed at this time. Co-morbidities/Complexities (which will affect course of rehabilitation):  []None           Arthritic conditions   []Rheumatoid arthritis (M05.9)  []Osteoarthritis (M19.91)   Cardiovascular conditions   []Hypertension (I10)  []Hyperlipidemia (E78.5)  []Angina pectoris (I20)  []Atherosclerosis (I70)   Musculoskeletal conditions   [x]Disc pathology   []Congenital spine pathologies   [x]Prior surgical intervention  []Osteoporosis (M81.8)  []Osteopenia (M85.8)   Endocrine conditions   []Hypothyroid (E03.9)  []Hyperthyroid Gastrointestinal conditions   []Constipation (Q01.34)   Metabolic conditions   []Morbid obesity (E66.01)  []Diabetes type 1(E10.65) or 2 (E11.65)   []Neuropathy (G60.9)     Pulmonary conditions   []Asthma (J45)  []Coughing   []COPD (J44.9)   Psychological Disorders  []Anxiety (F41.9)  []Depression (F32.9)   []Other:   []Other:            Barriers to/and or personal factors that will affect rehab potential:       [x]None                  []Age   []Sex     []Smoker              []Motivation/Lack of Motivation                        []Co-Morbidities              []Cognitive Function, education/learning barriers              []Environmental, home barriers              []profession/work barriers   []past PT/medical experience   []other:  Justification:     Falls Risk Assessment (30 days): [] NA  [x] Falls Risk assessed and no intervention required. [] Falls Risk assessed and Patient requires intervention due to being higher risk   Tinetti  score   [] Falls education provided, including:         ASSESSMENT: Pt is  27 Y. O female, presenting with diagnosis of low back pain with radiculopathy . Assessment reveals deficits in strength, ROM,  as well as increased pain. Pt will benefit from cont PT to address these deficits and promote return to highest level of functional independence. Functional Impairments:     [x]Noted lumbar/proximal hip hypomobility   []Noted lumbosacral and/or generalized hypermobility   []Decreased Lumbosacral/hip/LE functional ROM   [x]Decreased core/proximal hip strength and neuromuscular control    []Decreased LE functional strength    []Abnormal reflexes/sensation/myotomal/dermatomal deficits  []Reduced balance/proprioceptive control    []other:      Functional Activity Limitations (from functional questionnaire and intake)   [x]Reduced ability to tolerate prolonged functional positions   []Reduced ability or difficulty with changes of positions or transfers between positions   []Reduced ability to maintain good posture and demonstrate good body mechanics with sitting, bending, and lifting   [x]Reduced ability to sleep   [] Reduced ability or tolerance with driving and/or computer work   [x]Reduced ability to perform lifting, reaching, carrying tasks   []Reduced ability to squat   []Reduced ability to forward bend   [x]Reduced ability to ambulate prolonged functional periods/distances/surfaces   [x]Reduced ability to ascend/descend stairs   []other:       Participation Restrictions   [x]Reduced participation in self care activities   [x]Reduced participation in home management activities   [x]Reduced participation in work activities   [x]Reduced participation in social activities. [x]Reduced participation in sport/recreational activities. Classification:   [x]Signs/symptoms consistent with Lumbar instability/stabilization subgroup. []Signs/symptoms consistent with Lumbar mobilization/manipulation subgroup, myotomes and dermatomes intact. Meets manipulation criteria.     [x]Signs/symptoms consistent with Lumbar direction specific/centralization subgroup   [x]Signs/symptoms consistent with Lumbar traction subgroup       []Signs/symptoms consistent with lumbar facet dysfunction   []Signs/symptoms consistent with lumbar stenosis type dysfunction   []Signs/symptoms consistent with nerve root involvement including myotome & dermatome dysfunction   [x]Signs/symptoms consistent with post-surgical status including: decreased ROM, strength and function   []signs/symptoms consistent with pathology which may benefit from Dry needling     []other:      Prognosis/Rehab Potential:      []Excellent   [x]Good    []Fair   []Poor    Tolerance of evaluation/treatment:    []Excellent   [x]Good    []Fair   []Poor     Physical Therapy Evaluation Complexity Justification  [x] A history of present problem with:  [] no personal factors and/or comorbidities that impact the plan of care;  [x]1-2 personal factors and/or comorbidities that impact the plan of care  []3 personal factors and/or comorbidities that impact the plan of care  [x] An examination of body systems using standardized tests and measures addressing any of the following: body structures and functions (impairments), activity limitations, and/or participation restrictions;:  [] a total of 1-2 or more elements   [x] a total of 3 or more elements   [] a total of 4 or more elements   [x] A clinical presentation with:  [x] stable and/or uncomplicated characteristics   [] evolving clinical presentation with changing characteristics  [] unstable and unpredictable characteristics;   [x] Clinical decision making of [x] low, [] moderate, [] high complexity using standardized patient assessment instrument and/or measurable assessment of functional outcome.     [x] EVAL (LOW) 62383 (typically 20 minutes face-to-face)  [] EVAL (MOD) 51323 (typically 30 minutes face-to-face)  [] EVAL (HIGH) 40287 (typically 45 minutes face-to-face)  [] RE-EVAL     PLAN:      Plan Moving Forward/ For next visit:    Finish assessment of SI    Assess tolerance to positional distraction, may try manual traction   Progress stabilization exs, decrease dural tension       Frequency/Duration:  1 days per week for 6 Weeks:  Interventions:  [x]  Therapeutic exercise including: strength training, ROM, for LE, Glutes and core   [x]  NMR activation and proprioception for glutes , LE and Core   [x]  Manual therapy as indicated for Hip complex, LE and spine to include: Dry Needling/IASTM, STM, PROM, Gr I-IV mobilizations,    [x]  Modalities as needed that may include: thermal agents, E-stim, Biofeedback, US, iontophoresis as indicated  [x]  Patient education on joint protection, postural re-education, activity modification, progression of HEP. HEP instruction: Pt provided HEP via [employer]     GOALS:  Patient stated goal:  \"to help alleviate some of the symptoms\"  [] Progressing: [] Met: [] Not Met: [] Adjusted      Therapist goals for Patient:   Short Term Goals: To be achieved in: 3 weeks  1. Independent in HEP and progression per patient tolerance, in order to prevent re-injury. [] Progressing: [] Met: [] Not Met: [] Adjusted  2. Patient will have a decrease in pain to facilitate improvement in movement, function, and ADLs as indicated by Functional Deficits. [] Progressing: [] Met: [] Not Met: [] Adjusted      Long Term Goals: To be achieved in: 6 weeks  1. Disability index score of 40% (20/50)  or less for the TASHIA to assist with reaching prior level of function. [] Progressing: [] Met: [] Not Met: [] Adjusted  2. Patient will demonstrate increased AROM to WNL, good LS mobility, good hip ROM to allow for proper joint functioning as indicated by patients Functional Deficits. [] Progressing: [] Met: [] Not Met: [] Adjusted  3.  Patient will demonstrate an increase in Strength to good proximal hip and core activation to allow for proper functional mobility as indicated by patients Functional Deficits. [] Progressing: [] Met: [] Not Met: [] Adjusted  4. Patient will return to prior level of functional activities without increased symptoms or restriction. [] Progressing: [] Met: [] Not Met: [] Adjusted  5.  Pt will be able to tolerate sleeping in bed most of the night   [] Progressing: [] Met: [] Not Met: [] Adjusted       Electronically signed by:  Dwight Myers , OMT-C, [id number]

## 2021-12-08 NOTE — FLOWSHEET NOTE
Physical Therapy Daily Treatment Note    [x]Daily Treatment Note    []Progress Note    [] Discharge Summary         Date:  2021    Patient Name:  Mary Anne Stewart    :  1990  MRN: [id number]      Medical/Treatment Diagnosis Information:  · Diagnosis: low back pain with radiculopathy  · Treatment Diagnosis: LBP with radiculopathy    Insurance/Certification information:  [facility]    Physician Information:   Dr. Capri Savage of [facility] signed :  []  Yes  [x] No  []  Cosign [x]  Fax    Date of Patient follow up with Physician:  unknown    Is this a Progress Report:     []  Yes  [x]  No      If Yes:  Date Range for reporting period:  Beginning 21  Ending    Progress report will be due (10 Rx or 30 days whichever is less): by 73       Recertification will be due (POC Duration  / 90 days whichever is less): by 3/8/22 or 6th visit        Visit # Insurance Allowable Auth Required      [x]  Yes []  No        Functional Scale:       Measure used: mod oswestry  Date:  21  Score:  28/50 = 56%    Latex Allergy:  [x]NO      []YES  Preferred Language for Healthcare:   [x]English       []other:    RESTRICTIONS/PRECAUTIONS:  2 previous back surgeries L4-5, L 5-S1 (no fusions, no hardware)    SUBJECTIVE:  See eval    Pain level: At eval:    Intermittent LB pain, sometimes L, sometimes R, intermittent pain down L LE to toes,  Intermittent N/T also in leg. Frequent charley horses in legs at night    Patient reports pain is  2/10 pain at present and  9/10 pain at its worst.      Plan Moving Forward/ For next visit:   · Finish assessment of SI   · Assess tolerance to positional distraction, may try manual traction  · Progress stabilization exs, decrease dural tension            OBJECTIVE: See eval    Exercises/Interventions:     Exercises in bold performed in department today. Items not bolded are carried forward from prior visits for continuity of the record.   Exercise/Equipment Resistance/Repetitions HEP Other comments       []      Knee rocks 3x10 [x]      Sciatic nerve glide  3x10 [x]      Positional distraction SL R over roll  Pt inst to do this for 10-30 min at a time, 3x per day or more as needed, and to monitor leg symptoms to see if they improve. [x]        []        []        []        []        []          []         []        []        []        []        []        []        []        []      Therapeutic Exercise/Home Exercise Program:   25 minutes  Pt inst in role of PT, prognosis, plan of care, use of CP/HP, activity modification, and benefits of therapy. HEP has been established, See above, pt given handout(s) of new exercises. Pt likes the [employer] denton and will be using it    Access Code: [email or web address]  URL: [email or web address]  Date: 12/08/2021  Prepared by: Jackie Smith    Exercises  lower trunk rotation / knee rocks - 2 x daily - 7 x weekly - 3 sets - 10 reps  Supine Sciatic Nerve Glide - 2 x daily - 7 x weekly - 3 sets - 10 reps  Positional Distraction - 7 x weekly    Therapeutic Activity:  0 minutes     Gait: 0 minutes    Neuromuscular Re-Education:  0 minutes      Canalith Repositioning Procedure:  0 minutes     Manual Therapy:  0 minutes    Modalities: 0 minutes      ASSESSMENT:  See eval     GOALS:  Patient stated goal:  \"to help alleviate some of the symptoms\"  []? Progressing: []? Met: []? Not Met: []? Adjusted        Therapist goals for Patient:   Short Term Goals: To be achieved in: 3 weeks  1. Independent in HEP and progression per patient tolerance, in order to prevent re-injury. []? Progressing: []? Met: []? Not Met: []? Adjusted  2. Patient will have a decrease in pain to facilitate improvement in movement, function, and ADLs as indicated by Functional Deficits. []? Progressing: []? Met: []? Not Met: []? Adjusted        Long Term Goals: To be achieved in: 6 weeks  1.  Disability index score of 40% (20/50)  or less for the TASHIA to assist with reaching prior level of function. []? Progressing: []? Met: []? Not Met: []? Adjusted  2. Patient will demonstrate increased AROM to WNL, good LS mobility, good hip ROM to allow for proper joint functioning as indicated by patients Functional Deficits. []? Progressing: []? Met: []? Not Met: []? Adjusted  3. Patient will demonstrate an increase in Strength to good proximal hip and core activation to allow for proper functional mobility as indicated by patients Functional Deficits. []? Progressing: []? Met: []? Not Met: []? Adjusted  4. Patient will return to prior level of functional activities without increased symptoms or restriction. []? Progressing: []? Met: []? Not Met: []? Adjusted  5. Pt will be able to tolerate sleeping in bed most of the night   []? Progressing: []? Met: []? Not Met: []? Adjusted                 Overall Progression Towards Functional goals/ Treatment Progress Update:  [] Patient is progressing as expected towards functional goals listed. [] Progression is slowed due to complexities/Impairments listed. [] Progression has been slowed due to co-morbidities.   [x] Plan just implemented, too soon to assess goals progression <30days   [] Goals require adjustment due to lack of progress  [] Patient is not progressing as expected and requires additional follow up with physician  [] Other    Prognosis for POC: [x] Good [] Fair  [] Poor    Patient requires continued skilled intervention: [x] Yes  [] No    Treatment/Activity Tolerance:  [x] Patient able to complete treatment  [] Patient limited by fatigue  [] Patient limited by pain    [] Patient limited by other medical complications  [] Other:         PLAN: See eval  [] Continue per plan of care [] Alter current plan (see comments above)  [x] Plan of care initiated [] Hold pending MD visit [] Discharge        Therapeutic Exercise and NMR EXR  [x] (57001) Provided verbal/tactile cueing for activities related to strengthening, flexibility, endurance, ROM  for improvements in proximal strength and core control with self care, mobility, lifting and ambulation.  [] (92729) Provided verbal/tactile cueing for activities related to improving balance, coordination, kinesthetic sense, posture, motor skill, proprioception  to assist with core control in self care, mobility, lifting, and ambulation. Therapeutic Activities and Gait:    [] (18015 or 12371) Provided verbal/tactile cueing for activities related to improving balance, coordination, kinesthetic sense, posture, motor skill, proprioception and motor activation to allow for proper function  with self care and ADLs  [] (93722) Provided training and instruction to the patient for proper core and proximal hip recruitment and positioning with ambulation re-education     Home Exercise Program:    [x] (19173) Reviewed/Progressed HEP activities related to strengthening, flexibility, endurance, ROM of core, proximal hip and LE for functional self-care, mobility, lifting and ambulation   [] (59021) Reviewed/Progressed HEP activities related to improving balance, coordination, kinesthetic sense, posture, motor skill, proprioception of core, proximal hip and LE for self care, mobility, lifting, and ambulation      Manual Treatments:  PROM / STM / Oscillations-Mobs:  G-I, II, III, IV (PA's, Inf., Post.)  [] (19198) Provided manual therapy to mobilize proximal hip and LS spine soft tissue/joints for the purpose of modulating pain, promoting relaxation,  increasing ROM, reducing/eliminating soft tissue swelling/inflammation/restriction, improving soft tissue extensibility and allowing for proper ROM for normal function with self care, mobility, lifting and ambulation.      CRP:  [] (22015) Canalith Repositioning procedure for the assessment, treatment and education of BPPV    Modalities:   [] Ultrasound   [] Estim    [] Mechanical traction    [] Vaso-pneumatic device   [] Ionto     Charges:  Timed Code Treatment Minutes: 25   Total Treatment Minutes: 60     Medicare Cap total YTD:        [x]N/A  Workers Comp Time Stamp  (Per CPT and Total Treatment) [x]N/A   Time In:   Time Out:       [x] EVAL    [] Dry Needling  [x] PT(44532)   x  2   [] EStim Unattended 65900  [] NMR (14014)  x     [] Estim Attended  11812  [] Manual (92402)  x      [] Mechanical Txn 67537  [] TA    x     [] Ultrasound  [] Gait   x  [] Vaso  [] CRP    [] Ionto           [] Other:        Electronically signed by: Linda Dumont, PT , OMT-C, [id number]        Note: If patient does not return for scheduled/ recommended follow up visits, this note will serve as a discharge from care along with most recent update on progress.

## 2021-12-15 ENCOUNTER — HOSPITAL ENCOUNTER (OUTPATIENT)
Dept: PHYSICAL THERAPY | Age: 31
Setting detail: THERAPIES SERIES
Discharge: HOME | End: 2021-12-15
Payer: COMMERCIAL

## 2021-12-15 PROCEDURE — 97110 THERAPEUTIC EXERCISES: CPT

## 2021-12-15 PROCEDURE — 97140 MANUAL THERAPY 1/> REGIONS: CPT

## 2021-12-15 NOTE — FLOWSHEET NOTE
Physical Therapy Daily Treatment Note    [x]Daily Treatment Note    []Progress Note    [] Discharge Summary         Date:  12/15/2021    Patient Name:  Demetrius Soulier    :  1990  MRN: [id number]      Medical/Treatment Diagnosis Information:  · Diagnosis: low back pain with radiculopathy  · Treatment Diagnosis: LBP with radiculopathy    Insurance/Certification information:  [employer], needs auth    Physician Information:   Dr. Lucia Gerber of care signed :  [x]  Yes 12/10/21  [] No  []  Cosign [x]  Fax    Date of Patient follow up with Physician:  unknown    Is this a Progress Report:     []  Yes  [x]  No      If Yes:  Date Range for reporting period:  Beginning 21  Ending    Progress report will be due (10 Rx or 30 days whichever is less): by 03       Recertification will be due (POC Duration  / 90 days whichever is less): by 3/8/22 or 6th visit        Visit # Insurance Allowable Auth Required     6 VISITS APPROVED  - 2022 [x]  Yes []  No        Functional Scale:       Measure used: mod oswestry  Date:  21  Score:  28/50 = 56%    Latex Allergy:  [x]NO      []YES  Preferred Language for Healthcare:   [x]English       []other:    RESTRICTIONS/PRECAUTIONS:  2 previous back surgeries L4-5, L 5-S1 (no fusions, no hardware)    SUBJECTIVE:    Pt was a bit sore after first visit. Maybe prerna horse a bit less frequent after doing self traction      Pain level:  Currently 4/10   At eval:    Intermittent LB pain, sometimes L, sometimes R, intermittent pain down L LE to toes,  Intermittent N/T also in leg.   Frequent charley horses in legs at night    Patient reports pain is  2/10 pain at present and  9/10 pain at its worst.      Plan Moving Forward/ For next visit:   · Assess tolerance to may try manual traction, may try mechanical lumbar traction if appropriate   · Progress stabilization exs, decrease dural tension            OBJECTIVE:     Exercises/Interventions: Exercises in bold performed in department today. Items not bolded are carried forward from prior visits for continuity of the record. Exercise/Equipment Resistance/Repetitions HEP Other comments       []      Knee rocks 3x10 [x]      Sciatic nerve glide  3x10 [x]      Positional distraction SL R over roll  Pt inst to do this for 10-30 min at a time, 3x per day or more as needed, and to monitor leg symptoms to see if they improve. [x]      TA with exhale 5 sec, 1x10 [x]      Bridges  1x10 [x]        []        []        []          []         []        []        []        []        []        []        []        []      Therapeutic Exercise/Home Exercise Program:   15 minutes  HEP has been established, See above, reviewed, pt given handout(s) of new exercises. Pt likes the [employer] denton and will be using it, PT updated denton with new exs. Access Code: [email or web address]  URL: [email or web address]  Date: 12/15/2021  Prepared by: Jackie Smith    Exercises  lower trunk rotation / knee rocks - 2 x daily - 7 x weekly - 3 sets - 10 reps  Supine Sciatic Nerve Glide - 2 x daily - 7 x weekly - 3 sets - 10 reps  Positional Distraction - 7 x weekly  Supine Transversus Abdominis Bracing - Hands on Stomach - 1 x daily - 7 x weekly - 3 sets - 10 reps - 3-5 hold  Supine Bridge - 1 x daily - 7 x weekly - 3 sets - 10-15 reps    Therapeutic Activity:  0 minutes     Gait: 0 minutes    Neuromuscular Re-Education:  0 minutes      Canalith Repositioning Procedure:  0 minutes     Manual Therapy:  25 minutes  Completed SI assessment:   Standing landmarks level  Neg standing tests  Seated landmarks level  Neg sit up and neg prone knee flexion  Pt does not appear to have SI dysfunction. Manual lumbar traction with belt x 10 minutes, min relief noted    Modalities: 0 minutes      ASSESSMENT:      GOALS:  Patient stated goal:  \"to help alleviate some of the symptoms\"  []? Progressing: []? Met: []?  Not Met: []? Adjusted        Therapist goals for Patient:   Short Term Goals: To be achieved in: 3 weeks  1. Independent in HEP and progression per patient tolerance, in order to prevent re-injury. []? Progressing: []? Met: []? Not Met: []? Adjusted  2. Patient will have a decrease in pain to facilitate improvement in movement, function, and ADLs as indicated by Functional Deficits. []? Progressing: []? Met: []? Not Met: []? Adjusted        Long Term Goals: To be achieved in: 6 weeks  1. Disability index score of 40% (20/50)  or less for the TASHIA to assist with reaching prior level of function. []? Progressing: []? Met: []? Not Met: []? Adjusted  2. Patient will demonstrate increased AROM to WNL, good LS mobility, good hip ROM to allow for proper joint functioning as indicated by patients Functional Deficits. []? Progressing: []? Met: []? Not Met: []? Adjusted  3. Patient will demonstrate an increase in Strength to good proximal hip and core activation to allow for proper functional mobility as indicated by patients Functional Deficits. []? Progressing: []? Met: []? Not Met: []? Adjusted  4. Patient will return to prior level of functional activities without increased symptoms or restriction. []? Progressing: []? Met: []? Not Met: []? Adjusted  5. Pt will be able to tolerate sleeping in bed most of the night   []? Progressing: []? Met: []? Not Met: []? Adjusted                 Overall Progression Towards Functional goals/ Treatment Progress Update:  [] Patient is progressing as expected towards functional goals listed. [] Progression is slowed due to complexities/Impairments listed. [] Progression has been slowed due to co-morbidities.   [x] Plan just implemented, too soon to assess goals progression <30days   [] Goals require adjustment due to lack of progress  [] Patient is not progressing as expected and requires additional follow up with physician  [] Other    Prognosis for POC: [x] Good [] Fair  [] Poor    Patient requires continued skilled intervention: [x] Yes  [] No    Treatment/Activity Tolerance:  [x] Patient able to complete treatment  [] Patient limited by fatigue  [] Patient limited by pain    [] Patient limited by other medical complications  [] Other:         PLAN:   [x] Continue per plan of care [] Alter current plan (see comments above)  [] Plan of care initiated [] Hold pending MD visit [] Discharge        Therapeutic Exercise and NMR EXR  [x] (69166) Provided verbal/tactile cueing for activities related to strengthening, flexibility, endurance, ROM  for improvements in proximal strength and core control with self care, mobility, lifting and ambulation.  [] (23506) Provided verbal/tactile cueing for activities related to improving balance, coordination, kinesthetic sense, posture, motor skill, proprioception  to assist with core control in self care, mobility, lifting, and ambulation.      Therapeutic Activities and Gait:    [] (58859 or 01833) Provided verbal/tactile cueing for activities related to improving balance, coordination, kinesthetic sense, posture, motor skill, proprioception and motor activation to allow for proper function  with self care and ADLs  [] (03309) Provided training and instruction to the patient for proper core and proximal hip recruitment and positioning with ambulation re-education     Home Exercise Program:    [x] (06514) Reviewed/Progressed HEP activities related to strengthening, flexibility, endurance, ROM of core, proximal hip and LE for functional self-care, mobility, lifting and ambulation   [] (02916) Reviewed/Progressed HEP activities related to improving balance, coordination, kinesthetic sense, posture, motor skill, proprioception of core, proximal hip and LE for self care, mobility, lifting, and ambulation      Manual Treatments:  PROM / STM / Oscillations-Mobs:  G-I, II, III, IV (PA's, Inf., Post.)  [x] (32301) Provided manual therapy to mobilize proximal hip and LS spine soft tissue/joints for the purpose of modulating pain, promoting relaxation,  increasing ROM, reducing/eliminating soft tissue swelling/inflammation/restriction, improving soft tissue extensibility and allowing for proper ROM for normal function with self care, mobility, lifting and ambulation. CRP:  [] (17520) Canalith Repositioning procedure for the assessment, treatment and education of BPPV    Modalities:   [] Ultrasound   [] Estim    [] Mechanical traction    [] Vaso-pneumatic device   [] Ionto     Charges:  Timed Code Treatment Minutes: 40   Total Treatment Minutes: 40     Medicare Cap total YTD:        [x]N/A  Workers Comp Time Stamp  (Per CPT and Total Treatment) [x]N/A   Time In:   Time Out:       [] EVAL    [] Dry Needling  [x] IG(95607)   x  1   [] EStim Unattended 67757  [] NMR (65348)  x     [] Estim Attended  81371  [x] Manual (14307)  x  2    [] Mechanical Txn 43269  [] TA    x     [] Ultrasound  [] Gait   x  [] Vaso  [] CRP    [] Ionto           [] Other:        Electronically signed by: Christian Goodman PT , OMT-C, [id number]        Note: If patient does not return for scheduled/ recommended follow up visits, this note will serve as a discharge from care along with most recent update on progress.

## 2021-12-20 ENCOUNTER — APPOINTMENT (OUTPATIENT)
Dept: PHYSICAL THERAPY | Age: 31
End: 2021-12-20
Payer: COMMERCIAL

## 2021-12-22 ENCOUNTER — HOSPITAL ENCOUNTER (OUTPATIENT)
Dept: PHYSICAL THERAPY | Age: 31
Setting detail: THERAPIES SERIES
Discharge: HOME | End: 2021-12-22
Payer: COMMERCIAL

## 2021-12-22 PROCEDURE — 97110 THERAPEUTIC EXERCISES: CPT

## 2021-12-22 PROCEDURE — 97012 MECHANICAL TRACTION THERAPY: CPT

## 2021-12-22 NOTE — FLOWSHEET NOTE
Physical Therapy Daily Treatment Note    [x]Daily Treatment Note    []Progress Note    [] Discharge Summary         Date:  2021    Patient Name:  Edgar Arias    :  1990  MRN: [id number]      Medical/Treatment Diagnosis Information:  · Diagnosis: low back pain with radiculopathy  · Treatment Diagnosis: LBP with radiculopathy    Insurance/Certification information:  [employer], needs auth    Physician Information:   Dr. Brandt Champion of [facility] signed :  [x]  Yes 12/10/21  [] No  []  Cosign [x]  Fax    Date of Patient follow up with Physician:  unknown    Is this a Progress Report:     []  Yes  [x]  No      If Yes:  Date Range for reporting period:  Beginning 21  Ending    Progress report will be due (10 Rx or 30 days whichever is less): by 8/3/47       Recertification will be due (POC Duration  / 90 days whichever is less): by 3/8/22 or 6th visit        Visit # Insurance Allowable Auth Required   3/6  6 VISITS APPROVED  - 2022 [x]  Yes []  No        Functional Scale:       Measure used: mod oswestry  Date:  21  Score:  28/50 = 56%    Latex Allergy:  [x]NO      []YES  Preferred Language for Healthcare:   [x]English       []other:    RESTRICTIONS/PRECAUTIONS:  2 previous back surgeries L4-5, L 5-S1 (no fusions, no hardware)    SUBJECTIVE:    Pt states she feels cramping in legs is better, feels like it is about to cramp vs actual cramping. Pt states she is having trouble with urinary retention again in past few days. States she is able to urinate but takes a bit of time and not a lot of volume. Pt denies any signs of UTI. No issues with positional distraction, manual traction or HEP. Pt thinks nerve gliding ex helps the most.     Pain level:  Currently 0/10, pain as high as 4/10 in past week. At eval:    Intermittent LB pain, sometimes L, sometimes R, intermittent pain down L LE to toes,  Intermittent N/T also in leg.   Frequent charley horses in legs at night Patient reports pain is  2/10 pain at present and  9/10 pain at its worst.      Plan Moving Forward/ For next visit:   · Assess tolerance to mechanical lumbar traction and cont if appropriate   · Progress stabilization exs, decrease dural tension            OBJECTIVE:     Exercises/Interventions:     Exercises in bold performed in department today. Items not bolded are carried forward from prior visits for continuity of the record. Exercise/Equipment Resistance/Repetitions HEP Other comments       []      Knee rocks 3x10 [x]      Sciatic nerve glide  3x10 [x]      Positional distraction SL R over roll  Pt inst to do this for 10-30 min at a time, 3x per day or more as needed, and to monitor leg symptoms to see if they improve. [x]      TA with exhale 5 sec, 1x10 [x]      Bridges  1x10 [x]      Standing hip abd B  3x10 B  [x]      Standing heel raises  NV []      Mini squat at counter or doorknobs  []      Prone over pillows alt arm/leg lift     []         []        []        []        []        []        []        []        []      Therapeutic Exercise/Home Exercise Program:   15 minutes  HEP has been established, See above, reviewed, pt given handout(s) of new exercises. Pt educated on cauda equina syndrome, what to look for, inst pt if she has emergent issue then to go to ED. Otherwise, inst pt she can call MD office. Pt agreeable to plan. Reassessed LE reflexes:   Knee B 2+  Ankle B 2+    Reassessed great toe strength (prior to traction and after traction)  Prior: L 3/5  After: L 3+/5    Pt likes the [employer] denton and will be using it, PT updated denton with new exs. Access Code: [email or web address]  URL: [email or web address]  Date: 12/22/2021  Prepared by: Estevan Mckenzie    Exercises  lower trunk rotation / knee rocks - 2 x daily - 7 x weekly - 3 sets - 10 reps  Supine Sciatic Nerve Glide - 2 x daily - 7 x weekly - 3 sets - 10 reps  Positional Distraction - 7 x weekly  Supine Transversus Abdominis Bracing - Hands on Stomach - 1 x daily - 7 x weekly - 3 sets - 10 reps - 3-5 hold  Supine Bridge - 1 x daily - 7 x weekly - 3 sets - 10-15 reps  Standing Hip Abduction with Counter Support - 1 x daily - 7 x weekly - 3 sets - 10-15 reps      Therapeutic Activity:  0 minutes     Gait: 0 minutes    Neuromuscular Re-Education:  0 minutes      Canalith Repositioning Procedure:  0 minutes     Manual Therapy:  0 minutes    Modalities: 20 minutes  Mechanical lumbar traction, intermittent, 45#/25#, 45 sec/15 sec, x 15 minutes in hook lying with legs on traction stool  No complaints  Knee rocks and nerve glide after      ASSESSMENT:  No complaints, slight improvement noted in great toe ext L after traction     GOALS:  Patient stated goal:  \"to help alleviate some of the symptoms\"  []? Progressing: []? Met: []? Not Met: []? Adjusted        Therapist goals for Patient:   Short Term Goals: To be achieved in: 3 weeks  1. Independent in HEP and progression per patient tolerance, in order to prevent re-injury. []? Progressing: []? Met: []? Not Met: []? Adjusted  2. Patient will have a decrease in pain to facilitate improvement in movement, function, and ADLs as indicated by Functional Deficits. []? Progressing: []? Met: []? Not Met: []? Adjusted        Long Term Goals: To be achieved in: 6 weeks  1. Disability index score of 40% (20/50)  or less for the TASHIA to assist with reaching prior level of function. []? Progressing: []? Met: []? Not Met: []? Adjusted  2. Patient will demonstrate increased AROM to WNL, good LS mobility, good hip ROM to allow for proper joint functioning as indicated by patients Functional Deficits. []? Progressing: []? Met: []? Not Met: []? Adjusted  3. Patient will demonstrate an increase in Strength to good proximal hip and core activation to allow for proper functional mobility as indicated by patients Functional Deficits. []? Progressing: []? Met: []? Not Met: []? Adjusted  4.  Patient will return to prior level of functional activities without increased symptoms or restriction. []? Progressing: []? Met: []? Not Met: []? Adjusted  5. Pt will be able to tolerate sleeping in bed most of the night   []? Progressing: []? Met: []? Not Met: []? Adjusted                 Overall Progression Towards Functional goals/ Treatment Progress Update:  [] Patient is progressing as expected towards functional goals listed. [] Progression is slowed due to complexities/Impairments listed. [] Progression has been slowed due to co-morbidities. [x] Plan just implemented, too soon to assess goals progression <30days   [] Goals require adjustment due to lack of progress  [] Patient is not progressing as expected and requires additional follow up with physician  [] Other    Prognosis for POC: [x] Good [] Fair  [] Poor    Patient requires continued skilled intervention: [x] Yes  [] No    Treatment/Activity Tolerance:  [x] Patient able to complete treatment  [] Patient limited by fatigue  [] Patient limited by pain    [] Patient limited by other medical complications  [] Other:         PLAN:   [x] Continue per plan of care [] Alter current plan (see comments above)  [] Plan of care initiated [] Hold pending MD visit [] Discharge        Therapeutic Exercise and NMR EXR  [x] (50909) Provided verbal/tactile cueing for activities related to strengthening, flexibility, endurance, ROM  for improvements in proximal strength and core control with self care, mobility, lifting and ambulation.  [] (08704) Provided verbal/tactile cueing for activities related to improving balance, coordination, kinesthetic sense, posture, motor skill, proprioception  to assist with core control in self care, mobility, lifting, and ambulation.      Therapeutic Activities and Gait:    [] (15867 or 13947) Provided verbal/tactile cueing for activities related to improving balance, coordination, kinesthetic sense, posture, motor skill, proprioception and motor activation to allow for proper function  with self care and ADLs  [] (93244) Provided training and instruction to the patient for proper core and proximal hip recruitment and positioning with ambulation re-education     Home Exercise Program:    [x] (19610) Reviewed/Progressed HEP activities related to strengthening, flexibility, endurance, ROM of core, proximal hip and LE for functional self-care, mobility, lifting and ambulation   [] (38934) Reviewed/Progressed HEP activities related to improving balance, coordination, kinesthetic sense, posture, motor skill, proprioception of core, proximal hip and LE for self care, mobility, lifting, and ambulation      Manual Treatments:  PROM / STM / Oscillations-Mobs:  G-I, II, III, IV (PA's, Inf., Post.)  [] (11409) Provided manual therapy to mobilize proximal hip and LS spine soft tissue/joints for the purpose of modulating pain, promoting relaxation,  increasing ROM, reducing/eliminating soft tissue swelling/inflammation/restriction, improving soft tissue extensibility and allowing for proper ROM for normal function with self care, mobility, lifting and ambulation.      CRP:  [] (85486) Canalith Repositioning procedure for the assessment, treatment and education of BPPV    Modalities:   [] Ultrasound   [] Estim    [x] Mechanical traction    [] Vaso-pneumatic device   [] Ionto     Charges:  Timed Code Treatment Minutes: 25   Total Treatment Minutes: 45     Medicare Cap total YTD:        [x]N/A  Workers Comp Time Stamp  (Per CPT and Total Treatment) [x]N/A   Time In:   Time Out:       [] EVAL    [] Dry Needling  [x] GB(73799)   x  2   [] EStim Unattended 56396  [] NMR (79528)  x     [] Estim Attended  41579  [] Manual (97621)  x      [x] Mechanical Txn 95065  [] TA    x     [] Ultrasound  [] Gait   x  [] Vaso  [] CRP    [] Ionto           [] Other:        Electronically signed by: Merari Nugent PT , OMT-C, [id number]        Note: If patient does not return for scheduled/ recommended follow up visits, this note will serve as a discharge from care along with most recent update on progress.

## 2021-12-27 ENCOUNTER — APPOINTMENT (OUTPATIENT)
Dept: PHYSICAL THERAPY | Age: 31
End: 2021-12-27
Payer: COMMERCIAL

## 2021-12-29 ENCOUNTER — HOSPITAL ENCOUNTER (OUTPATIENT)
Dept: PHYSICAL THERAPY | Age: 31
Setting detail: THERAPIES SERIES
Discharge: HOME | End: 2021-12-29
Payer: COMMERCIAL

## 2021-12-29 PROCEDURE — 97110 THERAPEUTIC EXERCISES: CPT

## 2021-12-29 PROCEDURE — 97012 MECHANICAL TRACTION THERAPY: CPT

## 2021-12-29 NOTE — FLOWSHEET NOTE
Physical Therapy Daily Treatment Note    [x]Daily Treatment Note    []Progress Note    [] Discharge Summary         Date:  2021    Patient Name:  Britton Chen    :  1990  MRN: [id number]      Medical/Treatment Diagnosis Information:  · Diagnosis: low back pain with radiculopathy  · Treatment Diagnosis: LBP with radiculopathy    Insurance/Certification information:  [employer], needs auth    Physician Information:   Dr. Nikunj Watkinss of care signed :  [x]  Yes 12/10/21  [] No  []  Cosign [x]  Fax    Date of Patient follow up with Physician:  unknown    Is this a Progress Report:     []  Yes  [x]  No      If Yes:  Date Range for reporting period:  Beginning 21  Ending    Progress report will be due (10 Rx or 30 days whichever is less): by 83       Recertification will be due (POC Duration  / 90 days whichever is less): by 3/8/22 or 6th visit        Visit # Insurance Allowable Auth Required     6 VISITS APPROVED  - 2022 [x]  Yes []  No        Functional Scale:       Measure used: mod oswestry  Date:  21  Score:  28/50 = 56%    Latex Allergy:  [x]NO      []YES  Preferred Language for Healthcare:   [x]English       []other:    RESTRICTIONS/PRECAUTIONS:  2 previous back surgeries L4-5, L 5-S1 (no fusions, no hardware)    SUBJECTIVE:    Had some difficulty with pain after wrapping presents for 3 hours. Leg cramps still less that what they used to be. States no issues with traction, states ok to do again  No issues with HEP  Pt not scheduled for follow up with MD yet. Pt will call. No further change in bowel or bladder complaints        Pain level:  Currently 1-2/10, pain as high as 6/10 in past week. At eval:    Intermittent LB pain, sometimes L, sometimes R, intermittent pain down L LE to toes,  Intermittent N/T also in leg.   Frequent charley horses in legs at night    Patient reports pain is  2/10 pain at present and  9/10 pain at its worst.      Plan Moving Forward/ For next visit:   · continue mechanical lumbar traction if helpful  · Progress stabilization exs, decrease dural tension            OBJECTIVE:     Exercises/Interventions:     Exercises in bold performed in department today. Items not bolded are carried forward from prior visits for continuity of the record. Exercise/Equipment Resistance/Repetitions HEP Other comments       []      Knee rocks 3x10 [x] After traction     Sciatic nerve glide  3x10 [x] After traction     Positional distraction SL R over roll  Pt inst to do this for 10-30 min at a time, 3x per day or more as needed, and to monitor leg symptoms to see if they improve. [x]      TA with exhale 5 sec, 1x10 [x]      Bridges  1x10 [x]      Standing hip abd B  3x10 B  [x]      Standing heel raises  2x10 [x]      Mini squat at counter or doorknobs 1x10 [x]      Prone over pillows alt arm/leg lift    NV []         []        []      Multifidus walkouts  5# 3 laps B  []        []        []        []        []        []      Therapeutic Exercise/Home Exercise Program:   35 minutes  HEP has been established, See above, reviewed, pt given handout(s) of new exercises. Pt likes the [employer] denton and will be using it, PT updated denton with new exs. Access Code: [email or web address]  URL: [employer]/  Date: 12/29/2021  Prepared by: Oscar Hernandez    Exercises  lower trunk rotation / knee rocks - 2 x daily - 7 x weekly - 3 sets - 10 reps  Supine Sciatic Nerve Glide - 2 x daily - 7 x weekly - 3 sets - 10 reps  Positional Distraction - 7 x weekly  Supine Transversus Abdominis Bracing - Hands on Stomach - 1 x daily - 7 x weekly - 3 sets - 10 reps - 3-5 hold  Supine Bridge - 1 x daily - 7 x weekly - 3 sets - 10-15 reps  Standing Hip Abduction with Counter Support - 1 x daily - 7 x weekly - 3 sets - 10-15 reps  Heel rises with counter support - 1 x daily - 7 x weekly - 3 sets - 10-15 reps  Mini Squat with Counter Support - 1 x daily - 7 x weekly - 3 sets - 10 reps      Therapeutic Activity:  0 minutes     Gait: 0 minutes    Neuromuscular Re-Education:  0 minutes      Canalith Repositioning Procedure:  0 minutes     Manual Therapy:  0 minutes    Modalities: 20 minutes  Mechanical lumbar traction, intermittent, 55#/26#, 45 sec/15 sec, x 15 minutes in hook lying with legs on traction stool  No complaints  Knee rocks and nerve glide after      ASSESSMENT:  No complaints    GOALS:  Patient stated goal:  \"to help alleviate some of the symptoms\"  []? Progressing: []? Met: []? Not Met: []? Adjusted        Therapist goals for Patient:   Short Term Goals: To be achieved in: 3 weeks  1. Independent in HEP and progression per patient tolerance, in order to prevent re-injury. []? Progressing: []? Met: []? Not Met: []? Adjusted  2. Patient will have a decrease in pain to facilitate improvement in movement, function, and ADLs as indicated by Functional Deficits. []? Progressing: []? Met: []? Not Met: []? Adjusted        Long Term Goals: To be achieved in: 6 weeks  1. Disability index score of 40% (20/50)  or less for the TASHIA to assist with reaching prior level of function. []? Progressing: []? Met: []? Not Met: []? Adjusted  2. Patient will demonstrate increased AROM to WNL, good LS mobility, good hip ROM to allow for proper joint functioning as indicated by patients Functional Deficits. []? Progressing: []? Met: []? Not Met: []? Adjusted  3. Patient will demonstrate an increase in Strength to good proximal hip and core activation to allow for proper functional mobility as indicated by patients Functional Deficits. []? Progressing: []? Met: []? Not Met: []? Adjusted  4. Patient will return to prior level of functional activities without increased symptoms or restriction. []? Progressing: []? Met: []? Not Met: []? Adjusted  5. Pt will be able to tolerate sleeping in bed most of the night   []? Progressing: []? Met: []? Not Met: []?  Adjusted           Overall Progression Towards Functional goals/ Treatment Progress Update:  [] Patient is progressing as expected towards functional goals listed. [] Progression is slowed due to complexities/Impairments listed. [] Progression has been slowed due to co-morbidities. [x] Plan just implemented, too soon to assess goals progression <30days   [] Goals require adjustment due to lack of progress  [] Patient is not progressing as expected and requires additional follow up with physician  [] Other    Prognosis for POC: [x] Good [] Fair  [] Poor    Patient requires continued skilled intervention: [x] Yes  [] No    Treatment/Activity Tolerance:  [x] Patient able to complete treatment  [] Patient limited by fatigue  [] Patient limited by pain    [] Patient limited by other medical complications  [] Other:         PLAN:   [x] Continue per plan of care [] Alter current plan (see comments above)  [] Plan of care initiated [] Hold pending MD visit [] Discharge        Therapeutic Exercise and NMR EXR  [x] (00615) Provided verbal/tactile cueing for activities related to strengthening, flexibility, endurance, ROM  for improvements in proximal strength and core control with self care, mobility, lifting and ambulation.  [] (95079) Provided verbal/tactile cueing for activities related to improving balance, coordination, kinesthetic sense, posture, motor skill, proprioception  to assist with core control in self care, mobility, lifting, and ambulation.      Therapeutic Activities and Gait:    [] (68075 or 90729) Provided verbal/tactile cueing for activities related to improving balance, coordination, kinesthetic sense, posture, motor skill, proprioception and motor activation to allow for proper function  with self care and ADLs  [] (93696) Provided training and instruction to the patient for proper core and proximal hip recruitment and positioning with ambulation re-education     Home Exercise Program:    [x] (12729) Reviewed/Progressed HEP activities related to strengthening, flexibility, endurance, ROM of core, proximal hip and LE for functional self-care, mobility, lifting and ambulation   [] (04889) Reviewed/Progressed HEP activities related to improving balance, coordination, kinesthetic sense, posture, motor skill, proprioception of core, proximal hip and LE for self care, mobility, lifting, and ambulation      Manual Treatments:  PROM / STM / Oscillations-Mobs:  G-I, II, III, IV (PA's, Inf., Post.)  [] (32506) Provided manual therapy to mobilize proximal hip and LS spine soft tissue/joints for the purpose of modulating pain, promoting relaxation,  increasing ROM, reducing/eliminating soft tissue swelling/inflammation/restriction, improving soft tissue extensibility and allowing for proper ROM for normal function with self care, mobility, lifting and ambulation. CRP:  [] (49423) Canalith Repositioning procedure for the assessment, treatment and education of BPPV    Modalities:   [] Ultrasound   [] Estim    [x] Mechanical traction    [] Vaso-pneumatic device   [] Ionto     Charges:  Timed Code Treatment Minutes: 32   Total Treatment Minutes: 46     Medicare Cap total YTD:        [x]N/A  Workers Comp Time Stamp  (Per CPT and Total Treatment) [x]N/A   Time In:   Time Out:       [] EVAL    [] Dry Needling  [x] IX(67949)   x  2   [] EStim Unattended 34610  [] NMR (46995)  x     [] Estim Attended  40247  [] Manual (11629)  x      [x] Mechanical Txn 83201  [] TA    x     [] Ultrasound  [] Gait   x  [] Vaso  [] CRP    [] Ionto           [] Other:        Electronically signed by: Reed Cunningham, PT , OMT-C, [id number]        Note: If patient does not return for scheduled/ recommended follow up visits, this note will serve as a discharge from care along with most recent update on progress.

## 2022-01-05 ENCOUNTER — APPOINTMENT (OUTPATIENT)
Dept: PHYSICAL THERAPY | Age: 32
End: 2022-01-05
Payer: COMMERCIAL

## 2022-01-12 ENCOUNTER — HOSPITAL ENCOUNTER (OUTPATIENT)
Dept: PHYSICAL THERAPY | Age: 32
Setting detail: THERAPIES SERIES
Discharge: HOME | End: 2022-01-12
Payer: COMMERCIAL

## 2022-01-12 PROCEDURE — 97140 MANUAL THERAPY 1/> REGIONS: CPT

## 2022-01-12 PROCEDURE — 97110 THERAPEUTIC EXERCISES: CPT

## 2022-01-12 NOTE — PROGRESS NOTES
Physical Therapy Daily Treatment Note    [x]Daily Treatment Note    [x]Progress Note    [] Discharge Summary         Date:  2022    Patient Name:  Lionel Lane    :  1990  MRN: [id number]      Medical/Treatment Diagnosis Information:  · Diagnosis: low back pain with radiculopathy  · Treatment Diagnosis: LBP with radiculopathy    Insurance/Certification information:  [employer], needs auth    Physician Information:   Dr. Cheri Saldana (fax # for PT notes [phone])    Plan of care signed :  [x]  Yes 12/10/21  [] No  []  Cosign [x]  Fax    Date of Patient follow up with Physician:  unknown    Is this a Progress Report:     [x]  Yes  []  No      If Yes:  Date Range for reporting period:  Beginning 21  Ending 22 (note overdue due to dept cancelling pt's visit last week due to illness)    Progress report will be due (10 Rx or 30 days whichever is less): by       Recertification will be due (POC Duration  / 90 days whichever is less): by 3/8/22 or 6th visit        Visit # Insurance Allowable Auth Required   5/6  6 VISITS APPROVED  - 2022 [x]  Yes []  No        Functional Scale:       Measure used: mod oswestry  Date:  21  Score:  28/50 = 56%    Latex Allergy:  [x]NO      []YES  Preferred Language for Healthcare:   [x]English       []other:    RESTRICTIONS/PRECAUTIONS:  2 previous back surgeries L4-5, L 5-S1 (no fusions, no hardware)    SUBJECTIVE:    Pt states she was doing better until past few days. Pt states about 2-3 days ago, she must have slept wrong and woke up with excruciating pain 8-10. Pt took muscle relaxers and aleve and just rested on couch. No issues with HEP  Pt had called MD office several times to find out about MRI and whether or not she needs to be seen. Pt states MRI has been scheduled for 22 (not sure if approved yet) and then follow up with MD on 22.        No further change in bowel or bladder complaints        Pain level: Currently 2-3/10, pain as high as 8-9/10 in past week. At eval:    Intermittent LB pain, sometimes L, sometimes R, intermittent pain down L LE to toes,  Intermittent N/T also in leg. Frequent charley horses in legs at night    Patient reports pain is  2/10 pain at present and  9/10 pain at its worst.      Plan Moving Forward/ For next visit:   · Reassess pelvis and treat as needed. · continue mechanical lumbar traction if helpful  · Progress stabilization exs, decrease dural tension            OBJECTIVE:     Exercises/Interventions:     Exercises in bold performed in department today. Items not bolded are carried forward from prior visits for continuity of the record. Exercise/Equipment Resistance/Repetitions HEP Other comments       []      Knee rocks 3x10 [x] After traction     Sciatic nerve glide  3x10 [x] After traction     Positional distraction SL R over roll  Pt inst to do this for 10-30 min at a time, 3x per day or more as needed, and to monitor leg symptoms to see if they improve. [x]      TA with exhale 5 sec, 1x10 [x]      Bridges  1x10 [x]      Standing hip abd B  3x10 B  [x]      Standing heel raises  2x10 [x]      Mini squat at counter or doorknobs 1x10 [x]      Prone over pillows alt arm/leg lift    2x10 [x]         []        []      Multifidus walkouts  5# 3 laps B  []        []        []        []        []        []      Therapeutic Exercise/Home Exercise Program:   23 minutes  HEP has been established, See above, reviewed, progressed, pt given handout(s) of new exercises. Trunk AROM: flexion dec 50% and pain, extension WNL min pain, SB B dec 25% pain B, rot B WNL     Pt likes the [employer] denton and will be using it, PT updated denton with new exs. Access Code: [email or web address]  URL: [email or web address]  Date: 01/12/2022  Prepared by: Karrie Trinidad    Exercises  lower trunk rotation / knee rocks - 2 x daily - 7 x weekly - 3 sets - 10 reps  Supine Sciatic Nerve Glide - 2 x daily - 7 x weekly - 3 sets - 10 reps  Positional Distraction - 7 x weekly  Supine Transversus Abdominis Bracing - Hands on Stomach - 1 x daily - 7 x weekly - 3 sets - 10 reps - 3-5 hold  Supine Bridge - 1 x daily - 7 x weekly - 3 sets - 10-15 reps  Standing Hip Abduction with Counter Support - 1 x daily - 7 x weekly - 3 sets - 10-15 reps  Heel rises with counter support - 1 x daily - 7 x weekly - 3 sets - 10-15 reps  Mini Squat with Counter Support - 1 x daily - 7 x weekly - 3 sets - 10 reps  Prone Hip Extension - Two Pillows - 1 x daily - 7 x weekly - 3 sets - 10 reps    Therapeutic Activity:  0 minutes     Gait: 0 minutes    Neuromuscular Re-Education:  0 minutes      Canalith Repositioning Procedure:  0 minutes     Manual Therapy:  17 minutes  Pelvic and Sacral Component tests:   Standing pelvic landmarks: L crest and PSIS high  Standing flexion test:  L+  Seated pelvic landmarks: L crest and PSIS high   Long sit test:  L short and gets longer  Prone knee bend test: L short and gets longer    Pt + for post rotated L ilium. Treated with MET in SL and long leg distraction with SI emphasis and thrust.   Standing and seated alignment level and neg standing flexion test after     Modalities: 0 minutes    Held today due to pelvic dysfunction      ASSESSMENT:  Pt was making some progress but then has had a recent flare up. Pt + for SI dysfunction today which was resolved with today's treatment. GOALS:  Patient stated goal:  \"to help alleviate some of the symptoms\"  [x]? Progressing: []? Met: []? Not Met: []? Adjusted        Therapist goals for Patient:   Short Term Goals: To be achieved in: 3 weeks  1. Independent in HEP and progression per patient tolerance, in order to prevent re-injury. []? Progressing: [x]? Met: []? Not Met: []? Adjusted  2. Patient will have a decrease in pain to facilitate improvement in movement, function, and ADLs as indicated by Functional Deficits. [x]? Progressing: []? Met: []?  Not Met: []? Adjusted        Long Term Goals: To be achieved in: 6 weeks  1. Disability index score of 40% (20/50)  or less for the TASHIA to assist with reaching prior level of function. [x]? Progressing: []? Met: []? Not Met: []? Adjusted  2. Patient will demonstrate increased AROM to WNL, good LS mobility, good hip ROM to allow for proper joint functioning as indicated by patients Functional Deficits. [x]? Progressing: []? Met: []? Not Met: []? Adjusted  3. Patient will demonstrate an increase in Strength to good proximal hip and core activation to allow for proper functional mobility as indicated by patients Functional Deficits. [x]? Progressing: []? Met: []? Not Met: []? Adjusted  4. Patient will return to prior level of functional activities without increased symptoms or restriction. []? Progressing: []? Met: []? Not Met: []? Adjusted  5. Pt will be able to tolerate sleeping in bed most of the night   []? Progressing: []? Met: []? Not Met: []? Adjusted                 Overall Progression Towards Functional goals/ Treatment Progress Update:  [x] Patient is progressing as expected towards functional goals listed. [] Progression is slowed due to complexities/Impairments listed. [] Progression has been slowed due to co-morbidities.   [] Plan just implemented, too soon to assess goals progression <30days   [] Goals require adjustment due to lack of progress  [] Patient is not progressing as expected and requires additional follow up with physician  [] Other    Prognosis for POC: [x] Good [] Fair  [] Poor    Patient requires continued skilled intervention: [x] Yes  [] No    Treatment/Activity Tolerance:  [x] Patient able to complete treatment  [] Patient limited by fatigue  [] Patient limited by pain    [] Patient limited by other medical complications  [] Other:         PLAN:   [x] Continue per plan of care [] Alter current plan (see comments above)  [] Plan of care initiated [] Hold pending MD visit [] Discharge        Therapeutic Exercise and NMR EXR  [x] (46468) Provided verbal/tactile cueing for activities related to strengthening, flexibility, endurance, ROM  for improvements in proximal strength and core control with self care, mobility, lifting and ambulation.  [] (86223) Provided verbal/tactile cueing for activities related to improving balance, coordination, kinesthetic sense, posture, motor skill, proprioception  to assist with core control in self care, mobility, lifting, and ambulation. Therapeutic Activities and Gait:    [] (68320 or 26203) Provided verbal/tactile cueing for activities related to improving balance, coordination, kinesthetic sense, posture, motor skill, proprioception and motor activation to allow for proper function  with self care and ADLs  [] (87134) Provided training and instruction to the patient for proper core and proximal hip recruitment and positioning with ambulation re-education     Home Exercise Program:    [x] (30096) Reviewed/Progressed HEP activities related to strengthening, flexibility, endurance, ROM of core, proximal hip and LE for functional self-care, mobility, lifting and ambulation   [] (82291) Reviewed/Progressed HEP activities related to improving balance, coordination, kinesthetic sense, posture, motor skill, proprioception of core, proximal hip and LE for self care, mobility, lifting, and ambulation      Manual Treatments:  PROM / STM / Oscillations-Mobs:  G-I, II, III, IV (PA's, Inf., Post.)  [x] (43747) Provided manual therapy to mobilize proximal hip and LS spine soft tissue/joints for the purpose of modulating pain, promoting relaxation,  increasing ROM, reducing/eliminating soft tissue swelling/inflammation/restriction, improving soft tissue extensibility and allowing for proper ROM for normal function with self care, mobility, lifting and ambulation.      CRP:  [] (43807) Canalith Repositioning procedure for the assessment, treatment and education of BPPV    Modalities:   [] Ultrasound   [] Estim    [] Mechanical traction    [] Vaso-pneumatic device   [] Ionto     Charges:  Timed Code Treatment Minutes: 40   Total Treatment Minutes: 40     Medicare Cap total YTD:        [x]N/A  Workers Comp Time Stamp  (Per CPT and Total Treatment) [x]N/A   Time In:   Time Out:       [] EVAL    [] Dry Needling  [x] YG(83847)   x  2   [] EStim Unattended 20920  [] NMR (99653)  x     [] Estim Attended  01809  [x] Manual (28822)  x  1    [x] Mechanical Txn 26267  [] TA    x     [] Ultrasound  [] Gait   x  [] Vaso  [] CRP    [] Ionto           [] Other:        Electronically signed by: Shant Moreno PT , OMT-C, [id number]        Note: If patient does not return for scheduled/ recommended follow up visits, this note will serve as a discharge from care along with most recent update on progress.

## 2022-01-20 ENCOUNTER — HOSPITAL ENCOUNTER (OUTPATIENT)
Dept: PHYSICAL THERAPY | Age: 32
Setting detail: THERAPIES SERIES
Discharge: HOME | End: 2022-01-20
Payer: COMMERCIAL

## 2022-01-20 NOTE — FLOWSHEET NOTE
[facility]  [address]     [place], [facility]  Phone: [phone]   Fax:   [phone]      Physical Therapy  Cancellation/No-show Note    Patient Name:  Margaret Banuelos  :  1990   Date:  2022  Cancels to Date: 0  No-shows to Date: 1    For today's appointment patient:  []  Cancelled  []  Rescheduled appointment  [x]  No-show     Reason given by patient:  []  Patient ill  []  Conflicting appointment  []  No transportation    []  Conflict with work  []  No reason given  []  Other:     Comments:      Electronically signed by:  Adrienne Boss, [address] [facility] , [facility], [id number]

## 2022-03-30 ENCOUNTER — OFFICE VISIT (OUTPATIENT)
Dept: ORTHOPEDIC SURGERY | Age: 32
End: 2022-03-30
Payer: COMMERCIAL

## 2022-03-30 VITALS — HEIGHT: 60 IN | WEIGHT: 130 LBS | BODY MASS INDEX: 25.52 KG/M2

## 2022-03-30 DIAGNOSIS — M54.50 LUMBAR PAIN: ICD-10-CM

## 2022-03-30 DIAGNOSIS — M51.36 DDD (DEGENERATIVE DISC DISEASE), LUMBAR: Primary | ICD-10-CM

## 2022-03-30 PROCEDURE — G8427 DOCREV CUR MEDS BY ELIG CLIN: HCPCS | Performed by: ORTHOPAEDIC SURGERY

## 2022-03-30 PROCEDURE — 99214 OFFICE O/P EST MOD 30 MIN: CPT | Performed by: ORTHOPAEDIC SURGERY

## 2022-03-30 PROCEDURE — G8419 CALC BMI OUT NRM PARAM NOF/U: HCPCS | Performed by: ORTHOPAEDIC SURGERY

## 2022-03-30 PROCEDURE — G8484 FLU IMMUNIZE NO ADMIN: HCPCS | Performed by: ORTHOPAEDIC SURGERY

## 2022-03-30 PROCEDURE — 4004F PT TOBACCO SCREEN RCVD TLK: CPT | Performed by: ORTHOPAEDIC SURGERY

## 2022-03-30 RX ORDER — PREGABALIN 25 MG/1
25 CAPSULE ORAL 3 TIMES DAILY
Qty: 60 CAPSULE | Refills: 0 | Status: SHIPPED | OUTPATIENT
Start: 2022-03-30 | End: 2022-05-29

## 2022-03-30 NOTE — PROGRESS NOTES
New Patient: LUMBAR SPINE    Referring Provider:  No ref. provider found    CHIEF COMPLAINT:    Chief Complaint   Patient presents with    Back Pain     Patient states that her pain has continued since surgery. S/P MLD 1 year. Low back pain and left posterior leg pain. HISTORY OF PRESENT ILLNESS:    Ms. Margaret Banuelos  is 1 year status post MLD left L4-L5 for recurrent disc herniation. Her symptoms return 2 weeks following the surgery and have steadily increased since that time. She notes her back and left leg pain are equal.  She denies saddle anesthesia and bowel bladder abnormality.       Current/Past Treatment:   · Physical Therapy: Recent, did not help  · Chiropractic: None  · Injection: None past  · Medications: None, does not like the side effects of gabapentin    Past Medical History:   Past Medical History:   Diagnosis Date    Asthma     as a child    Bacterial vaginosis     Chlamydia     Depression     as adolescent    Factor 5 Leiden mutation, heterozygous ([address]     Sciatic pain       Past Surgical History:     Past Surgical History:   Procedure Laterality Date    EPIDURAL STEROID INJECTION Bilateral 6/4/2019    BILATERAL LUMBAR FOUR TRANSFORAMINAL EPIDURAL STEROID INJECTION SITE CONFIRMED BY FLUOROSCOPY performed by Bud Suarez MD at [place] [address] N/A 10/28/2020    HEMORRHOID SURGERY N/A 10/28/2020    HEMORRHOIDECTOMY performed by Janene Ortez MD at [address] [place] [address]  N/A 12/20/2019    MICROLUMBAR DISCECTOMY L5-S1 performed by Milla Gastelum MD at [address] [place] [address] Dr N/A 1/6/2021    MICROLUMBAR DISCECTOMY RE-EXPLORATION L4-5 performed by Milla Gastelum MD at [address] [facility] Left 10/27/2020    LEFT LUMBAR FOUR LUMBAR FIVE TRANSFORAMINAL EPIDURAL STEROID INJECTION SITE CONFIRMED BY FLUOROSCOPY performed by Bud Suarez MD at [facility] DX/THER SBST INTRLMNR CRV/THRC W/IMG GDN Left 8/28/2018    LEFT LUMBAR FIVE SACRAL ONE TRANSFORAMINAL EPIDURAL STEROID INJECTION SITE CONFIRMED BY FLUOROSOCOPY performed by Annita Barber MD at [address] EXTRACTION  October 2010     Current Medications:     Current Outpatient Medications:     ondansetron (ZOFRAN) 4 MG tablet, Take 1 tablet by mouth 3 times daily as needed for Nausea or Vomiting (Patient not taking: Reported on 3/30/2022), Disp: 15 tablet, Rfl: 0  Allergies:  Nickel and Percocet [oxycodone-acetaminophen]  Social History:    reports that she has been smoking. She started smoking about 18 years ago. She has been smoking about 0.50 packs per day. She has never used smokeless tobacco. She reports that she does not drink alcohol and does not use drugs. Family History:   Family History   Problem Relation Age of Onset    High Blood Pressure Mother     Diabetes Maternal Grandmother     High Blood Pressure Maternal Grandmother     Diabetes Maternal Grandfather     Diabetes Paternal Grandmother     Diabetes Paternal Grandfather     Heart Disease Paternal Grandfather        REVIEW OF SYSTEMS: Full ROS noted & scanned   CONSTITUTIONAL: Denies unexplained weight loss, fevers, chills or fatigue  NEUROLOGICAL: Denies unsteady gait or progressive weakness  MUSCULOSKELETAL: Denies joint swelling or redness  PSYCHOLOGICAL: Denies anxiety, depression   SKIN: Denies skin changes, delayed healing, rash, itching   HEMATOLOGIC: Denies easy bleeding or bruising  ENDOCRINE: Denies excessive thirst, urination, heat/cold  RESPIRATORY: Denies current dyspnea, cough  GI: Denies nausea, vomiting, diarrhea   : Denies bowel or bladder issues      PHYSICAL EXAM:    Vitals: Height 5' (1.524 m), weight 130 lb (59 kg), unknown if currently breastfeeding. GENERAL EXAM:  · General Apparence: Patient is adequately groomed with no evidence of malnutrition. · Orientation: The patient is oriented to time, place and person.    · Mood & Affect:The patient's mood and affect are appropriate. · Vascular: Examination reveals no swelling tenderness in upper or lower extremities. Good capillary refill. · Lymphatic: The lymphatic examination bilaterally reveals all areas to be without enlargement or induration  · Sensation: Sensation is intact without deficit  · Coordination/Balance: Good coordination     LUMBAR/SACRAL EXAMINATION:  · Inspection: Local inspection shows no step-off or bruising. Lumbar alignment is normal.  Sagittal and Coronal balance is neutral.      · Palpation:   No evidence of tenderness at the midline. No tenderness bilaterally at the paraspinal or trochanters. There is no step-off or paraspinal spasm. · Range of Motion: Lumbar flexion, extension and rotation are mildly limited due to pain. · Strength:   Strength testing is 5/5 in all muscle groups tested. · Special Tests:   Straight leg raise and crossed SLR negative. Leg length and pelvis level. · Skin: There are no rashes, ulcerations or lesions. · Reflexes: Reflexes are symmetrically 2+ at the patellar and ankle tendons. Clonus absent bilaterally at the feet. · Gait & station: normal, patient ambulates without assistance    · Additional Examinations:   · RIGHT LOWER EXTREMITY: Inspection/examination of the right lower extremity does not show any tenderness, deformity or injury. Range of motion is unremarkable. There is no gross instability. There are no rashes, ulcerations or lesions. Strength and tone are normal.    · LEFT LOWER EXTREMITY:  Inspection/examination of the left lower extremity does not show any tenderness, deformity or injury. Range of motion is unremarkable. There is no gross instability. There are no rashes, ulcerations or lesions. Strength and tone are normal.    Diagnostic Testing:    I reviewed AP and flexion-extension lateral x-rays of the lumbar spine were obtained in the office today.   Those show degenerative disc disease L4-L5 and L5-S1 without instability      Impression:   I reviewed MRI images of her lumbar spine from 1/25/2022 in the office today. Those show degenerative disc disease L4-L5 and L5-S1 without recurrent disc herniation    Plan:    We discussed treatment options including observation, physical therapy, epidural injection, and additional imaging.  She would like to proceed with her care and referral for possible injections

## 2022-05-19 ENCOUNTER — HOSPITAL ENCOUNTER (OUTPATIENT)
Age: 32
Setting detail: OUTPATIENT SURGERY
Discharge: HOME | End: 2022-05-19
Attending: PAIN MEDICINE | Admitting: PAIN MEDICINE
Payer: COMMERCIAL

## 2022-05-19 VITALS
OXYGEN SATURATION: 100 % | SYSTOLIC BLOOD PRESSURE: 116 MMHG | TEMPERATURE: 98.7 F | DIASTOLIC BLOOD PRESSURE: 80 MMHG | HEART RATE: 60 BPM | RESPIRATION RATE: 20 BRPM

## 2022-05-19 PROCEDURE — 2580000003 HC RX 258: Performed by: PAIN MEDICINE

## 2022-05-19 PROCEDURE — 7100000010 HC PHASE II RECOVERY - FIRST 15 MIN: Performed by: PAIN MEDICINE

## 2022-05-19 PROCEDURE — 64484 NJX AA&/STRD TFRM EPI L/S EA: CPT | Performed by: PAIN MEDICINE

## 2022-05-19 PROCEDURE — 6360000004 HC RX CONTRAST MEDICATION: Performed by: PAIN MEDICINE

## 2022-05-19 PROCEDURE — 64483 NJX AA&/STRD TFRM EPI L/S 1: CPT | Performed by: PAIN MEDICINE

## 2022-05-19 PROCEDURE — 6360000002 HC RX W HCPCS: Performed by: PAIN MEDICINE

## 2022-05-19 PROCEDURE — 2500000003 HC RX 250 WO HCPCS: Performed by: PAIN MEDICINE

## 2022-05-19 PROCEDURE — 7100000011 HC PHASE II RECOVERY - ADDTL 15 MIN: Performed by: PAIN MEDICINE

## 2022-05-19 PROCEDURE — 3600000002 HC SURGERY LEVEL 2 BASE: Performed by: PAIN MEDICINE

## 2022-05-19 RX ORDER — MIDAZOLAM HYDROCHLORIDE 1 MG/ML
INJECTION INTRAMUSCULAR; INTRAVENOUS PRN
Status: DISCONTINUED | OUTPATIENT
Start: 2022-05-19 | End: 2022-05-19 | Stop reason: ALTCHOICE

## 2022-05-19 RX ORDER — SODIUM CHLORIDE, SODIUM LACTATE, POTASSIUM CHLORIDE, CALCIUM CHLORIDE 600; 310; 30; 20 MG/100ML; MG/100ML; MG/100ML; MG/100ML
INJECTION, SOLUTION INTRAVENOUS ONCE
Status: COMPLETED | OUTPATIENT
Start: 2022-05-19 | End: 2022-05-19

## 2022-05-19 RX ORDER — LIDOCAINE HYDROCHLORIDE 10 MG/ML
INJECTION, SOLUTION EPIDURAL; INFILTRATION; INTRACAUDAL; PERINEURAL PRN
Status: DISCONTINUED | OUTPATIENT
Start: 2022-05-19 | End: 2022-05-19 | Stop reason: ALTCHOICE

## 2022-05-19 RX ADMIN — SODIUM CHLORIDE, POTASSIUM CHLORIDE, SODIUM LACTATE AND CALCIUM CHLORIDE: 600; 310; 30; 20 INJECTION, SOLUTION INTRAVENOUS at 16:18

## 2022-05-19 RX ADMIN — LIDOCAINE HYDROCHLORIDE 0.1 ML: 10 INJECTION, SOLUTION EPIDURAL; INFILTRATION; INTRACAUDAL; PERINEURAL at 16:18

## 2022-05-19 ASSESSMENT — PAIN DESCRIPTION - DESCRIPTORS: DESCRIPTORS: SHARP;ACHING;THROBBING

## 2022-05-19 NOTE — PROCEDURES
Tanesha Izquierdo is a 34 y.o. female patient. No diagnosis found. Past Medical History:   Diagnosis Date    Asthma     as a child    Bacterial vaginosis     Chlamydia     Depression     as adolescent    Factor 5 Leiden mutation, heterozygous ([address]     Sciatic pain      Blood pressure 118/76, pulse 79, temperature 97.8 °F (36.6 °C), temperature source Temporal, resp. rate 18, SpO2 100 %, unknown if currently breastfeeding. Procedures    Pablo Jauregui MD  5/19/2022  TRANSFORAMINAL EPIDURAL AT L L45  LEVELS  14295 and 76209 x  with 20336,     INDICATIONS:  Lumbar Radiculitis 724.4, M54.16  OPERATIVE PROCEDURE:  Consent was signed by the patient after the risks and benefits were explained. With the patient in the prone position, the back was prepped with Prevail and draped. Aseptic technique was used at every stage of the procedure. Oblique fluoroscopic guidance was used to visualize the pedicle of the _L4__ vertebral body on the _L__ side. Skin infiltration was with 0.5 cc of 1% Lidocaine using a 30-gauge needle followed by placement of a _22__ -gauge _5__ -inch needle using oblique fluoroscopic guidance into the transforaminal epidural space way under the pedicle at the medial aspect just lateral to SAP. Final needle position was checked in AP view, which was just lateral to the 6 oclock position on the pedicle. Aspiration was negative for CSF or blood . Injection of Omnipaque dye (0.5 cc) showed appropriate spread along the nerve root and also into the epidural space and _1__ cc of _1__ % Lidocaine with _4.5 mg of CELESTONE   The needle was pulled out intact and discharge instructions were given. ESTIMATED BLOOD LOSS:  Less than 1 cc      Pulse Ox Monitoring was done.      Exact similar procedure was done at _L L5__ level.            ________________________________                   Ivon Saravia M.D.

## 2022-05-19 NOTE — PROGRESS NOTES
Discharge  instructions reviewed. Pt and family verbalize understanding with no further questions. VSS. Pt discharged via [facility] to car. Assessment unchanged.

## 2022-06-17 ENCOUNTER — HOSPITAL ENCOUNTER (OUTPATIENT)
Dept: ULTRASOUND IMAGING | Age: 32
Discharge: HOME | End: 2022-06-17
Payer: COMMERCIAL

## 2022-06-17 DIAGNOSIS — E04.9 ENLARGED THYROID: ICD-10-CM

## 2022-06-17 PROCEDURE — 76536 US EXAM OF HEAD AND NECK: CPT

## 2022-08-04 ENCOUNTER — HOSPITAL ENCOUNTER (OUTPATIENT)
Age: 32
Setting detail: OUTPATIENT SURGERY
Discharge: HOME | End: 2022-08-04
Attending: PAIN MEDICINE | Admitting: PAIN MEDICINE
Payer: COMMERCIAL

## 2022-08-04 VITALS
HEIGHT: 60 IN | DIASTOLIC BLOOD PRESSURE: 66 MMHG | BODY MASS INDEX: 31.41 KG/M2 | TEMPERATURE: 98.2 F | WEIGHT: 160 LBS | RESPIRATION RATE: 16 BRPM | HEART RATE: 53 BPM | SYSTOLIC BLOOD PRESSURE: 120 MMHG | OXYGEN SATURATION: 100 %

## 2022-08-04 PROBLEM — M47.816 LUMBAR SPONDYLOSIS: Status: ACTIVE | Noted: 2022-08-04

## 2022-08-04 LAB — PREGNANCY, URINE: NEGATIVE

## 2022-08-04 PROCEDURE — 84703 CHORIONIC GONADOTROPIN ASSAY: CPT

## 2022-08-04 PROCEDURE — 2709999900 HC NON-CHARGEABLE SUPPLY: Performed by: PAIN MEDICINE

## 2022-08-04 PROCEDURE — 64493 INJ PARAVERT F JNT L/S 1 LEV: CPT | Performed by: PAIN MEDICINE

## 2022-08-04 PROCEDURE — 7100000010 HC PHASE II RECOVERY - FIRST 15 MIN: Performed by: PAIN MEDICINE

## 2022-08-04 PROCEDURE — 64494 INJ PARAVERT F JNT L/S 2 LEV: CPT | Performed by: PAIN MEDICINE

## 2022-08-04 PROCEDURE — 3600000002 HC SURGERY LEVEL 2 BASE: Performed by: PAIN MEDICINE

## 2022-08-04 PROCEDURE — 2500000003 HC RX 250 WO HCPCS: Performed by: PAIN MEDICINE

## 2022-08-04 RX ORDER — LIDOCAINE HYDROCHLORIDE 10 MG/ML
INJECTION, SOLUTION EPIDURAL; INFILTRATION; INTRACAUDAL; PERINEURAL PRN
Status: DISCONTINUED | OUTPATIENT
Start: 2022-08-04 | End: 2022-08-04 | Stop reason: ALTCHOICE

## 2022-08-04 RX ORDER — BUPIVACAINE HYDROCHLORIDE 7.5 MG/ML
INJECTION, SOLUTION EPIDURAL; RETROBULBAR PRN
Status: DISCONTINUED | OUTPATIENT
Start: 2022-08-04 | End: 2022-08-04 | Stop reason: ALTCHOICE

## 2022-08-04 RX ORDER — BUPIVACAINE HYDROCHLORIDE 7.5 MG/ML
INJECTION, SOLUTION EPIDURAL; RETROBULBAR
Status: DISCONTINUED
Start: 2022-08-04 | End: 2022-08-04 | Stop reason: HOSPADM

## 2022-08-04 NOTE — PROCEDURES
Carla Fernando is a 34 y.o. female patient. No diagnosis found. Past Medical History:   Diagnosis Date    Asthma     as a child    Bacterial vaginosis     Chlamydia     Depression     as adolescent    Factor 5 Leiden mutation, heterozygous ([address]     Sciatic pain      Blood pressure 132/68, pulse (!) 45, temperature 98.2 °F (36.8 °C), temperature source Temporal, resp. rate 16, height 5' (1.524 m), weight 160 lb (72.6 kg), SpO2 100 %, unknown if currently breastfeeding. Procedures    Maya Perez MD  8/4/2022    INDICATIONS:  Lumbar Spondylosis 721.3, M47.816,M47.817  OPERATIVE PROCEDURE:  B L3,L4,L5 MBB TO BLOCK THE L45 AND L5S1 FACET JOINT  79185 and 57151 WITH 07526,   Consent signed by patient after risks and benefits explained. Patient was in prone position. Back was prepped with Prevail and draped. Aseptic technique used at every stage of the procedure. Skin wheal with 1% Lidocaine with 30-gauge needle. A _22__ -gauge, _5__ -inch spinal needle was placed under fluoroscopic guidance using AP/oblique views at the junction of superior articular process and sacral ala on the _B__ sides to access the __L5__ medial branches. Then, 0.5 cc of 0.75% MARCAINE  was injected after negative aspiration for blood or CSF. Needle pulled out intact. Exact similar procedure was performed at the junction of superior articular process and transverse process at     L4,L5__ vertebral levels to numb the  L3,L4__ medial branches. Patient tolerated procedure well and discharge instructions were given.   90% RELIEF WITH SIG INCREASE IN SPINAL ROM       ESTIMATED BLOOD LOSS:  Less than 1 cc       Pulse Ox Monitoring was done.            ________________________________                   Sesar Mcmullen M.D.

## 2022-08-04 NOTE — PROGRESS NOTES
Patient is able to demonstrate the ability to move from a reclining position to an upright position within the recliner. Pt checked in for procedure. Pt's  in the waiting room and number provided on the chart.

## 2022-09-15 ENCOUNTER — HOSPITAL ENCOUNTER (OUTPATIENT)
Age: 32
Setting detail: OUTPATIENT SURGERY
Discharge: HOME | End: 2022-09-15
Attending: PAIN MEDICINE | Admitting: PAIN MEDICINE
Payer: COMMERCIAL

## 2022-09-15 VITALS
HEART RATE: 54 BPM | DIASTOLIC BLOOD PRESSURE: 79 MMHG | SYSTOLIC BLOOD PRESSURE: 121 MMHG | HEIGHT: 60 IN | OXYGEN SATURATION: 99 % | WEIGHT: 150 LBS | BODY MASS INDEX: 29.45 KG/M2 | TEMPERATURE: 97.7 F | RESPIRATION RATE: 12 BRPM

## 2022-09-15 LAB — PREGNANCY, URINE: NEGATIVE

## 2022-09-15 PROCEDURE — 84703 CHORIONIC GONADOTROPIN ASSAY: CPT

## 2022-09-15 PROCEDURE — 64494 INJ PARAVERT F JNT L/S 2 LEV: CPT | Performed by: PAIN MEDICINE

## 2022-09-15 PROCEDURE — 7100000010 HC PHASE II RECOVERY - FIRST 15 MIN: Performed by: PAIN MEDICINE

## 2022-09-15 PROCEDURE — 3600000002 HC SURGERY LEVEL 2 BASE: Performed by: PAIN MEDICINE

## 2022-09-15 PROCEDURE — 64493 INJ PARAVERT F JNT L/S 1 LEV: CPT | Performed by: PAIN MEDICINE

## 2022-09-15 PROCEDURE — 2500000003 HC RX 250 WO HCPCS: Performed by: PAIN MEDICINE

## 2022-09-15 RX ORDER — BUPIVACAINE HYDROCHLORIDE 7.5 MG/ML
INJECTION, SOLUTION EPIDURAL; RETROBULBAR PRN
Status: DISCONTINUED | OUTPATIENT
Start: 2022-09-15 | End: 2022-09-15 | Stop reason: ALTCHOICE

## 2022-09-15 RX ORDER — LIDOCAINE HYDROCHLORIDE 10 MG/ML
INJECTION, SOLUTION INFILTRATION; PERINEURAL PRN
Status: DISCONTINUED | OUTPATIENT
Start: 2022-09-15 | End: 2022-09-15 | Stop reason: ALTCHOICE

## 2022-09-15 ASSESSMENT — PAIN - FUNCTIONAL ASSESSMENT: PAIN_FUNCTIONAL_ASSESSMENT: 0-10

## 2022-09-15 NOTE — DISCHARGE INSTRUCTIONS
[address] [facility]    [phone]    Post Pain Management Injection    PATIENT INSTRUCTIONS:     -Resume Normal Diet  -Other    ACTIVITY:    -No driving or operating machinery for 8 hours post procedure without sedation and 24 hours with sedation. If you are seen driving during this time the proper authorities will be notified.  -Do not stay alone for 4-6 hours after the procedure.  -If you have had IV sedation, do not sign legal documents, make any major decisions, or be involved in work decisions for the remainder of the day. -May shower or bathe.  -Resume normal activity when full movement/sensation has returned in extremities. 3)  SITE CARE:    -Observe puncture site for signs of infection (redness, warmth swelling, drainage with a foul odor, fever or increased tenderness). 4)  EXPECTED SIDE EFFECTS:    -Numbness/tingling/weakness in extremities, if this lasts more than 6 hours notify Dr. Nickolas Beckham. -Muscle stiffness, soreness at puncture site (soreness may last 2-4 days). -pre procedure pain           5)  TO REACH DR. Clay Martinez: Call [phone]    6)  ADDITIONAL INSTRUCTIONS:    Follow-up as scheduled or call for appointment if not already done. Patients taking Coumadin may resume taking as before the procedure.

## 2022-09-15 NOTE — PROGRESS NOTES
Discharge instructions given to pt and verbalized understanding, states pain is 80-85% better, Dr Akira Fairchild aware, no numbness or weakness noted, pt ambulated out to car without complications

## 2022-09-15 NOTE — PROCEDURES
Amena Chavarria is a 34 y.o. female patient. No diagnosis found. Past Medical History:   Diagnosis Date    Asthma     as a child    Bacterial vaginosis     Chlamydia     Depression     as adolescent    Factor 5 Leiden mutation, heterozygous ([address]     Sciatic pain      Blood pressure 107/60, pulse 58, temperature 97.7 °F (36.5 °C), temperature source Temporal, resp. rate 16, height 5' (1.524 m), weight 150 lb (68 kg), last menstrual period 09/10/2022, SpO2 100 %, unknown if currently breastfeeding. Procedures    Héctor Enrique MD  9/15/2022    INDICATIONS:  Lumbar Spondylosis 721.3, M47.816,M47.817  OPERATIVE PROCEDURE:  B L3,L4,L5 MBB TO BLOCK THE L45 AND L5S1 FACET JOINT  31925 and 77989 WITH 79183,   Consent signed by patient after risks and benefits explained. Patient was in prone position. Back was prepped with Prevail and draped. Aseptic technique used at every stage of the procedure. Skin wheal with 1% Lidocaine with 30-gauge needle. A _22__ -gauge, _5__ -inch spinal needle was placed under fluoroscopic guidance using AP/oblique views at the junction of superior articular process and sacral ala on the _B__ sides to access the __L5__ medial branches. Then, 0.5 cc of 0.75% MARCAINE  was injected after negative aspiration for blood or CSF. Needle pulled out intact. Exact similar procedure was performed at the junction of superior articular process and transverse process at     L4,L5__ vertebral levels to numb the  L3,L4__ medial branches. Patient tolerated procedure well and discharge instructions were given.   80% RELIEF WITH SIG INCREASE IN SPINAL ROM       ESTIMATED BLOOD LOSS:  Less than 1 cc       Pulse Ox Monitoring was done.            ________________________________                   Sydni Flores M.D.

## 2022-10-27 ENCOUNTER — HOSPITAL ENCOUNTER (OUTPATIENT)
Age: 32
Setting detail: OUTPATIENT SURGERY
Discharge: HOME | End: 2022-10-27
Attending: PAIN MEDICINE | Admitting: PAIN MEDICINE
Payer: COMMERCIAL

## 2022-10-27 VITALS
BODY MASS INDEX: 29.45 KG/M2 | HEIGHT: 60 IN | RESPIRATION RATE: 16 BRPM | TEMPERATURE: 98 F | OXYGEN SATURATION: 100 % | HEART RATE: 51 BPM | SYSTOLIC BLOOD PRESSURE: 105 MMHG | WEIGHT: 150 LBS | DIASTOLIC BLOOD PRESSURE: 65 MMHG

## 2022-10-27 LAB — PREGNANCY, URINE: NEGATIVE

## 2022-10-27 PROCEDURE — 3600000012 HC SURGERY LEVEL 2 ADDTL 15MIN: Performed by: PAIN MEDICINE

## 2022-10-27 PROCEDURE — 99153 MOD SED SAME PHYS/QHP EA: CPT | Performed by: PAIN MEDICINE

## 2022-10-27 PROCEDURE — 84703 CHORIONIC GONADOTROPIN ASSAY: CPT

## 2022-10-27 PROCEDURE — 7100000010 HC PHASE II RECOVERY - FIRST 15 MIN: Performed by: PAIN MEDICINE

## 2022-10-27 PROCEDURE — 99152 MOD SED SAME PHYS/QHP 5/>YRS: CPT | Performed by: PAIN MEDICINE

## 2022-10-27 PROCEDURE — 6360000002 HC RX W HCPCS: Performed by: PAIN MEDICINE

## 2022-10-27 PROCEDURE — 3600000002 HC SURGERY LEVEL 2 BASE: Performed by: PAIN MEDICINE

## 2022-10-27 PROCEDURE — 2709999900 HC NON-CHARGEABLE SUPPLY: Performed by: PAIN MEDICINE

## 2022-10-27 PROCEDURE — 64635 DESTROY LUMB/SAC FACET JNT: CPT | Performed by: PAIN MEDICINE

## 2022-10-27 PROCEDURE — 7100000011 HC PHASE II RECOVERY - ADDTL 15 MIN: Performed by: PAIN MEDICINE

## 2022-10-27 PROCEDURE — 77003 FLUOROGUIDE FOR SPINE INJECT: CPT | Performed by: PAIN MEDICINE

## 2022-10-27 PROCEDURE — 2500000003 HC RX 250 WO HCPCS: Performed by: PAIN MEDICINE

## 2022-10-27 PROCEDURE — 64636 DESTROY L/S FACET JNT ADDL: CPT | Performed by: PAIN MEDICINE

## 2022-10-27 RX ORDER — LIDOCAINE HYDROCHLORIDE 10 MG/ML
INJECTION, SOLUTION EPIDURAL; INFILTRATION; INTRACAUDAL; PERINEURAL PRN
Status: DISCONTINUED | OUTPATIENT
Start: 2022-10-27 | End: 2022-10-27 | Stop reason: ALTCHOICE

## 2022-10-27 RX ORDER — MIDAZOLAM HYDROCHLORIDE 1 MG/ML
INJECTION INTRAMUSCULAR; INTRAVENOUS
Status: DISCONTINUED
Start: 2022-10-27 | End: 2022-10-27 | Stop reason: HOSPADM

## 2022-10-27 RX ORDER — MIDAZOLAM HYDROCHLORIDE 1 MG/ML
INJECTION INTRAMUSCULAR; INTRAVENOUS PRN
Status: DISCONTINUED | OUTPATIENT
Start: 2022-10-27 | End: 2022-10-27 | Stop reason: ALTCHOICE

## 2022-10-27 RX ORDER — SODIUM CHLORIDE, SODIUM LACTATE, POTASSIUM CHLORIDE, CALCIUM CHLORIDE 600; 310; 30; 20 MG/100ML; MG/100ML; MG/100ML; MG/100ML
INJECTION, SOLUTION INTRAVENOUS CONTINUOUS
Status: DISCONTINUED | OUTPATIENT
Start: 2022-10-27 | End: 2022-10-27 | Stop reason: HOSPADM

## 2022-10-27 RX ORDER — BUPIVACAINE HYDROCHLORIDE 7.5 MG/ML
INJECTION, SOLUTION EPIDURAL; RETROBULBAR PRN
Status: DISCONTINUED | OUTPATIENT
Start: 2022-10-27 | End: 2022-10-27 | Stop reason: ALTCHOICE

## 2022-10-27 ASSESSMENT — PAIN - FUNCTIONAL ASSESSMENT
PAIN_FUNCTIONAL_ASSESSMENT: PREVENTS OR INTERFERES WITH MANY ACTIVE NOT PASSIVE ACTIVITIES
PAIN_FUNCTIONAL_ASSESSMENT: 0-10

## 2022-10-27 ASSESSMENT — PAIN DESCRIPTION - DESCRIPTORS: DESCRIPTORS: SHARP;ACHING

## 2022-10-27 ASSESSMENT — PAIN SCALES - GENERAL
PAINLEVEL_OUTOF10: 2

## 2022-10-27 NOTE — PROCEDURES
Ifrah Atkins is a 34 y.o. female patient. No diagnosis found. Past Medical History:   Diagnosis Date    Asthma     as a child    Bacterial vaginosis     Chlamydia     Depression     as adolescent    Factor 5 Leiden mutation, heterozygous ([address]     Sciatic pain      Blood pressure (!) 111/53, pulse 52, temperature 98 °F (36.7 °C), temperature source Temporal, resp. rate 15, height 5' (1.524 m), weight 150 lb (68 kg), last menstrual period 10/10/2022, SpO2 100 %, unknown if currently breastfeeding. Dulce Lee MD  10/27/2022    DICTATING PHYSICIAN: Negrito Nguyen M.D        PREOPERATIVE DIAGNOSES: Lumbar Spondylosis 721.3, M47.816, M47.817       POSTOPERATIVE DIAGNOSES: Lumbar Spondylosis       OPERATIVE PROCEDURES:  Radiofrequency Neurotomy of medial branches at _B L3,L4,L5__ levels to block the L45 and L5S1 facet joints. 54495 and 64636 X 1 WITH 12658,  BILATERAL   SURGEON: Negrito Nguyen M.D   INDICATIONS:  Lumbar Spondylosis  OPERATIVE PROCEDURE:  Consent signed by patient after risks and benefits explained. Patient was in prone position. Back was prepped with Prevail and draped. Aseptic technique used at every stage of the procedure. Skin wheal with 1% Lidocaine with 30-gauge needle. A _18__ -gauge, _100__ -mm, curved tip radiofrequency needle with _10__ -mm active tip was placed under fluoroscopic guidance using A.P. views at the junction of superior articular process and sacral ala on the _B__ sides to access the __L5__ medial branches. The needle was walked anteriorly and superiorly by couple of millimeters. Then the lateral view was checked to make sure the tip of the needle is at the posterior part of the spine, clearly posterior to the posterior margin of the neural foramen at that level by at least couple of millimeters. Sensory stimulation at 50 hertz at 0.5 v did not create any sensation in the legs.  Motor stimulation at 2 hertz at 2 volts did not produce any muscle contraction in the _B__ legs. Sensory stimulation at 50hz at 0.5v did not create any pain down the leg. 0.3 cc of 1% lidocaine was injected and radiofrequency lesioning was done for 90 seconds at 80 degree centigrade. Needle pulled out intact. Exact similar procedure was performed at the junction of superior articular process and transverse process at _B L4,L5__ vertebral body levels to burn the __B L3,L4__ medial branches. Paraspinal muscle twitching was seen at [facility] __ vertebral levels. Patient did not feel radicular pain either during needle placement or during lesioning. Patient tolerated the procedure well. Intravenous sedation was with _6__ mg of Versed. 2 LESIONS    ESTIMATED BLOOD LOSS:  Less than 1 cc      Patient was monitored and stable. Discharge instructions were given.

## 2022-10-27 NOTE — PROGRESS NOTES
Discharge  instructions reviewed. Pt and family verbalize understanding with no further questions. VSS. Pt discharged via [address] to car. Assessment unchanged.

## 2022-10-27 NOTE — DISCHARGE INSTRUCTIONS
[address] [facility]          [phone]          Post Radiofrequency for Dr. Abelardo Madrid        Pain may be worse 1-2 weeks post procedure. Take pain medicine as needed. This includes pain at needle puncture site(s), mild increased neck or back stiffness, or deep back or neck pain. Treatments for mild post procedure surgery include relative rest for 3-4 days; avoid movements which aggravate pain, and applying cold compresses. Contact Dr. Abelardo Madrid if you develop a fever >102, new severe unremitting lower back pain, new persistent weakness, or new bowel or bladder incontinence. You will need to follow up with Dr. Abelardo Madrid within 4 weeks after the procedure. No driving day of procedure. If you are seen driving during this time the proper authorities will be notified. Do not sign legal documents, make any major decisions, or be involved in work decisions for the remainder of the day. Do not stay alone for 4-6 hours after the procedure.     TO [phone] TO MAKE A FOLLOW UP APPOINTMENT

## 2023-01-05 ENCOUNTER — HOSPITAL ENCOUNTER (OUTPATIENT)
Age: 33
Setting detail: OUTPATIENT SURGERY
Discharge: HOME | End: 2023-01-05
Attending: PAIN MEDICINE | Admitting: PAIN MEDICINE
Payer: COMMERCIAL

## 2023-01-05 VITALS
WEIGHT: 155 LBS | HEIGHT: 60 IN | HEART RATE: 61 BPM | OXYGEN SATURATION: 100 % | RESPIRATION RATE: 16 BRPM | DIASTOLIC BLOOD PRESSURE: 90 MMHG | TEMPERATURE: 98 F | SYSTOLIC BLOOD PRESSURE: 139 MMHG | BODY MASS INDEX: 30.43 KG/M2

## 2023-01-05 PROBLEM — M46.1 SACROILIITIS, NOT ELSEWHERE CLASSIFIED (HCC): Status: ACTIVE | Noted: 2023-01-05

## 2023-01-05 LAB — PREGNANCY, URINE: NEGATIVE

## 2023-01-05 PROCEDURE — 3600000002 HC SURGERY LEVEL 2 BASE: Performed by: PAIN MEDICINE

## 2023-01-05 PROCEDURE — 6360000004 HC RX CONTRAST MEDICATION: Performed by: PAIN MEDICINE

## 2023-01-05 PROCEDURE — 84703 CHORIONIC GONADOTROPIN ASSAY: CPT

## 2023-01-05 PROCEDURE — 27096 INJECT SACROILIAC JOINT: CPT | Performed by: PAIN MEDICINE

## 2023-01-05 PROCEDURE — 3600000012 HC SURGERY LEVEL 2 ADDTL 15MIN: Performed by: PAIN MEDICINE

## 2023-01-05 PROCEDURE — 2500000003 HC RX 250 WO HCPCS: Performed by: PAIN MEDICINE

## 2023-01-05 PROCEDURE — 7100000010 HC PHASE II RECOVERY - FIRST 15 MIN: Performed by: PAIN MEDICINE

## 2023-01-05 PROCEDURE — 6360000002 HC RX W HCPCS: Performed by: PAIN MEDICINE

## 2023-01-05 RX ORDER — IBUPROFEN 200 MG
200 TABLET ORAL EVERY 6 HOURS PRN
COMMUNITY

## 2023-01-05 RX ORDER — NAPROXEN 25 MG/ML
SUSPENSION ORAL 2 TIMES DAILY
COMMUNITY

## 2023-01-05 RX ORDER — BETAMETHASONE SODIUM PHOSPHATE AND BETAMETHASONE ACETATE 3; 3 MG/ML; MG/ML
INJECTION, SUSPENSION INTRA-ARTICULAR; INTRALESIONAL; INTRAMUSCULAR; SOFT TISSUE
Status: DISCONTINUED
Start: 2023-01-05 | End: 2023-01-05 | Stop reason: HOSPADM

## 2023-01-05 RX ORDER — LIDOCAINE HYDROCHLORIDE 10 MG/ML
INJECTION, SOLUTION EPIDURAL; INFILTRATION; INTRACAUDAL; PERINEURAL PRN
Status: DISCONTINUED | OUTPATIENT
Start: 2023-01-05 | End: 2023-01-05 | Stop reason: ALTCHOICE

## 2023-01-05 NOTE — DISCHARGE INSTRUCTIONS
[address]    [phone]    Post Pain Management Injection    PATIENT INSTRUCTIONS:     -Resume Normal Diet  -Other    ACTIVITY:    -No driving or operating machinery for 8 hours post procedure without sedation and 24 hours with sedation. If you are seen driving during this time the proper authorities will be notified.  -Do not stay alone for 4-6 hours after the procedure.  -If you have had IV sedation, do not sign legal documents, make any major decisions, or be involved in work decisions for the remainder of the day. -May shower or bathe.  -Resume normal activity when full movement/sensation has returned in extremities. 3)  SITE CARE:    -Observe puncture site for signs of infection (redness, warmth swelling, drainage with a foul odor, fever or increased tenderness). 4)  EXPECTED SIDE EFFECTS:    -Numbness/tingling/weakness in extremities, if this lasts more than 6 hours notify Dr. Lakhwinder Rodriguez. -Muscle stiffness, soreness at puncture site (soreness may last 2-4 days). DIABETIC PATIENTS ONLY:    -Increased glucose levels in all diabetic patients who have received a steroid injection. -Monitor blood sugars frequently for the first 5 days following procedure.      -Adjust medication accordingly. 6)  TO REACH DR. Saurabh Mejias: Call [phone]    ADDITIONAL INSTRUCTIONS:    Follow-up as scheduled or call for appointment if not already done. Patients taking Coumadin may resume taking as before the procedure.

## 2023-01-05 NOTE — PROCEDURES
Amena Chavarria is a 34 y.o. female patient. No diagnosis found. Past Medical History:   Diagnosis Date    Asthma     as a child    Bacterial vaginosis     Chlamydia     Depression     as adolescent    Factor 5 Leiden mutation, heterozygous ([address]     Sciatic pain      Blood pressure (!) 139/90, pulse 61, temperature 98 °F (36.7 °C), temperature source Infrared, resp. rate 16, height 5' (1.524 m), weight 155 lb (70.3 kg), last menstrual period 01/03/2023, SpO2 100 %, unknown if currently breastfeeding. Procedures    Héctor Enrique MD  1/5/2023    INDICATIONS:  Sacroilitis 720.2, M46.1  OPERATIVE PROCEDURE:  L    SACROILIAC INJECTION 62773 with 28762,    Consent was signed by the patient after the risks and benefits were explained. With the patient in the prone position, the back was prepped with Prevail and draped. Aseptic technique was used at every stage of the procedure. Flouroscopic angulation was used to separate the medial and lateral joint lines of the posterior aspect of the sacroiliac joint  Skin infiltration was with 0.5 cc of 1% Lidocaine using a 30-gauge needle followed by placement of a _22__ -gauge needle _3.5__ -inch needle using fluoroscopic guidance in the posterior inferior aspect of the _L__ side medial sacroiliac joint line. Aspiration was negative for CSF or blood . __1.5__cc of _1__ % of lidocaine with _9__ mg CELESTONE was injected and the needle was pulled out intact. The patient tolerated the procedure well and discharge instructions were given.         Dye was injected and the spread was seen EXCELLENT     ESTIMATED BLOOD LOSS:  Less than 1 cc                ________________________________                   Sydni Flores M.D.

## 2023-01-25 ENCOUNTER — OFFICE VISIT (OUTPATIENT)
Dept: ENT CLINIC | Age: 33
End: 2023-01-25
Payer: COMMERCIAL

## 2023-01-25 VITALS
BODY MASS INDEX: 30.43 KG/M2 | OXYGEN SATURATION: 100 % | HEIGHT: 60 IN | DIASTOLIC BLOOD PRESSURE: 52 MMHG | HEART RATE: 56 BPM | SYSTOLIC BLOOD PRESSURE: 101 MMHG | WEIGHT: 155 LBS | TEMPERATURE: 98 F

## 2023-01-25 DIAGNOSIS — J34.3 NASAL TURBINATE HYPERTROPHY: ICD-10-CM

## 2023-01-25 DIAGNOSIS — E04.1 THYROID NODULE: ICD-10-CM

## 2023-01-25 DIAGNOSIS — R09.81 NASAL CONGESTION: ICD-10-CM

## 2023-01-25 DIAGNOSIS — Q38.8 VELOPHARYNGEAL INSUFFICIENCY (VPI), CONGENITAL: ICD-10-CM

## 2023-01-25 DIAGNOSIS — J34.2 DEVIATED NASAL SEPTUM: Primary | ICD-10-CM

## 2023-01-25 PROCEDURE — G8484 FLU IMMUNIZE NO ADMIN: HCPCS | Performed by: OTOLARYNGOLOGY

## 2023-01-25 PROCEDURE — G8417 CALC BMI ABV UP PARAM F/U: HCPCS | Performed by: OTOLARYNGOLOGY

## 2023-01-25 PROCEDURE — 99204 OFFICE O/P NEW MOD 45 MIN: CPT | Performed by: OTOLARYNGOLOGY

## 2023-01-25 PROCEDURE — G8427 DOCREV CUR MEDS BY ELIG CLIN: HCPCS | Performed by: OTOLARYNGOLOGY

## 2023-01-25 PROCEDURE — 1036F TOBACCO NON-USER: CPT | Performed by: OTOLARYNGOLOGY

## 2023-01-25 PROCEDURE — 31231 NASAL ENDOSCOPY DX: CPT | Performed by: OTOLARYNGOLOGY

## 2023-01-25 RX ORDER — HYDROCODONE BITARTRATE AND ACETAMINOPHEN 5; 325 MG/1; MG/1
TABLET ORAL
COMMUNITY
Start: 2023-01-24

## 2023-01-25 ASSESSMENT — ENCOUNTER SYMPTOMS
SORE THROAT: 0
SINUS PRESSURE: 0
SHORTNESS OF BREATH: 0
COUGH: 0
APNEA: 0
FACIAL SWELLING: 0
VOICE CHANGE: 0
EYE ITCHING: 0
TROUBLE SWALLOWING: 1

## 2023-01-25 NOTE — PROGRESS NOTES
[facility], [address], [address], [facility] Linda Carballo  P: [phone]       Albaro Jones  1990    ChiefComplaint     Chief Complaint   Patient presents with    Sinus Problem     Patient is here today for sinus issues. Patient states January of 2022 her sinuses got very congested, been to the PCP several times, given medications that did not help. None of the time was it a infection. Patient has tried OTC allergy meds, and flonase with no relief. She states that off and on she is congested to where she can not breathe from nose. She states when she eats something small it will get in her sinuses, and she can not get it out until she blows her nose. Thyroid Problem     Patient has a thyroid nodule found in June. She states when it was found there was not much worry about it, but would like to make sure it is still nothing to worry about. History of Present Illness     Nery Huff is a 78-year-old female here today for evaluation of bilateral nasal congestion/obstruction. States she had sudden onset of symptoms in January 2022 since that time has been treated with multiple courses of oral antibiotics, has tried Flonase for greater than 4 weeks on a daily basis without resolution of symptoms. Has tried over-the-counter allergy medications as well as oral decongestants without relief. She will have several hours per day when she is on remove any air through her nose, once this improves she still feels bilateral congestion. Reports sense of smell is normal.    Had thyroid ultrasound performed over the summer due to signs and symptoms of hypothyroidism small subcentimeter nodule noted. No family history of thyroid cancer. Reports when swallowing small foods such as rice a piece will go into her nose and she must blow it out through her nose in order to clear it. Does not have any regurgitation of liquids.     Past Medical History     Past Medical History: Diagnosis Date    Asthma     as a child    Bacterial vaginosis     Chlamydia     Depression     as adolescent    Factor 5 Leiden mutation, heterozygous ([address]     Sciatic pain        Past Surgical History     Past Surgical History:   Procedure Laterality Date    EPIDURAL STEROID INJECTION Bilateral 6/4/2019    BILATERAL LUMBAR FOUR TRANSFORAMINAL EPIDURAL STEROID INJECTION SITE CONFIRMED BY FLUOROSCOPY performed by Wes Govea MD at [facility] N/A 10/28/2020    HEMORRHOID SURGERY N/A 10/28/2020    HEMORRHOIDECTOMY performed by Madeline Smith MD at [address] N/A 12/20/2019    MICROLUMBAR DISCECTOMY L5-S1 performed by Tyra Hernandez MD at [facility] 1/6/2021    MICROLUMBAR DISCECTOMY RE-EXPLORATION L4-5 performed by Tyra Hernandez MD at [facility] Bilateral 9/15/2022    BILATERAL LUMBAR THREE LUMBAR FOUR LUMBAR FIVE MEDIAL BRANCH BLOCK SITE CONFIRMED BY FLUOROSCOPY performed by Ran Ocampo MD at [address] [facility] Left 1/5/2023    LEFT SACROILIAC JOINT INJECTION SITE CONFIRMED BY FLUOROSCOPY performed by Ran Ocampo MD at [place] [address] Left 10/27/2020    LEFT LUMBAR FOUR LUMBAR FIVE TRANSFORAMINAL EPIDURAL STEROID INJECTION SITE CONFIRMED BY FLUOROSCOPY performed by Wes Govea MD at [address] Left 5/19/2022    LEFT LUMBAR FOUR FIVE EPIDURAL STEROID INJECTION SITE CONFIRMED BY FLUOROSCOPY performed by Ran Ocampo MD at [address] [facility] Bilateral 8/4/2022    BILATERAL LUMBAR THREE LUMBAR FOUR LUMBAR FIVE MEDIAL BRANCH BLOCK SITE CONFIRMED BY FLUOROSCOPY performed by Ran Ocampo MD at [place] [address] Bilateral 10/27/2022    BILATERAL LUMBAR THREE LUMBAR FOUR LUMBAR FIVE RADIOFREQUENCY ABLATION SITE CONFIRMED BY FLUOROSCOPY performed by Ran Ocampo MD at [address] [facility] DX/THER SBST INTRLMNR CRV/THRC W/IMG GDN Left 8/28/2018    LEFT LUMBAR FIVE SACRAL ONE TRANSFORAMINAL EPIDURAL STEROID INJECTION SITE CONFIRMED BY FLUOROSOCOPY performed by Sandhya Avendaño MD at [address] EXTRACTION  October 2010       Family History     Family History   Problem Relation Age of Onset    High Blood Pressure Mother     Diabetes Maternal Grandmother     High Blood Pressure Maternal Grandmother     Diabetes Maternal Grandfather     Diabetes Paternal Grandmother     Diabetes Paternal Grandfather     Heart Disease Paternal Grandfather        Social History     Social History     Tobacco Use    Smoking status: Former     Packs/day: 0.50     Types: Cigarettes     Start date: 12/23/2003    Smokeless tobacco: Never   Vaping Use    Vaping Use: Never used   Substance Use Topics    Alcohol use: No     Alcohol/week: 0.0 - 3.0 standard drinks    Drug use: No        Allergies     Allergies   Allergen Reactions    Nickel     Percocet [Oxycodone-Acetaminophen] Itching       Medications     Current Outpatient Medications   Medication Sig Dispense Refill    HYDROcodone-acetaminophen (NORCO) 5-325 MG per tablet       ibuprofen (ADVIL;MOTRIN) 200 MG tablet Take 200 mg by mouth every 6 hours as needed for Pain      naproxen (NAPROSYN) 125 MG/5ML suspension Take by mouth 2 times daily      pregabalin (LYRICA) 25 MG capsule Take 1 capsule by mouth 3 times daily for 60 days. 60 capsule 0    ondansetron (ZOFRAN) 4 MG tablet Take 1 tablet by mouth 3 times daily as needed for Nausea or Vomiting (Patient not taking: Reported on 3/30/2022) 15 tablet 0     No current facility-administered medications for this visit. Review of Systems     Review of Systems   Constitutional:  Negative for appetite change, chills, fatigue, fever and unexpected weight change. HENT:  Positive for congestion, postnasal drip and trouble swallowing.  Negative for ear discharge, ear pain, facial swelling, hearing loss, nosebleeds, sinus pressure, sneezing, sore throat, tinnitus and voice change. Eyes:  Negative for itching. Respiratory:  Negative for apnea, cough and shortness of breath. Endocrine: Negative for cold intolerance and heat intolerance. Musculoskeletal:  Negative for myalgias and neck pain. Skin:  Negative for rash. Allergic/Immunologic: Negative for environmental allergies. Neurological:  Negative for dizziness and headaches. Psychiatric/Behavioral:  Negative for confusion, decreased concentration and sleep disturbance. PhysicalExam     Vitals:    01/25/23 1035   BP: (!) 101/52   Site: Left Upper Arm   Position: Sitting   Pulse: 56   Temp: 98 °F (36.7 °C)   TempSrc: Infrared   SpO2: 100%   Weight: 155 lb (70.3 kg)   Height: 5' (1.524 m)       Physical Exam  Constitutional:       General: She is not in acute distress. Appearance: She is well-developed. HENT:      Head: Normocephalic and atraumatic. Right Ear: Tympanic membrane, ear canal and external ear normal. No drainage. No middle ear effusion. Tympanic membrane is not bulging. Tympanic membrane has normal mobility. Left Ear: Tympanic membrane, ear canal and external ear normal. No drainage. No middle ear effusion. Tympanic membrane is not bulging. Tympanic membrane has normal mobility. Nose: No mucosal edema or rhinorrhea. Right Turbinates: Enlarged and swollen. Left Turbinates: Enlarged and swollen. Mouth/Throat:      Lips: Pink. Mouth: Mucous membranes are moist.      Tongue: No lesions. Palate: No mass. Pharynx: Uvula midline. Eyes:      Pupils: Pupils are equal, round, and reactive to light. Neck:      Thyroid: No thyroid mass or thyromegaly. Trachea: Trachea and phonation normal.   Cardiovascular:      Pulses: Normal pulses. Pulmonary:      Effort: Pulmonary effort is normal. No accessory muscle usage or respiratory distress. Breath sounds: No stridor. Musculoskeletal:      Cervical back: Full passive range of motion without pain. Lymphadenopathy:      Head:      Right side of head: No submental or submandibular adenopathy. Left side of head: No submental or submandibular adenopathy. Cervical: No cervical adenopathy. Right cervical: No superficial, deep or posterior cervical adenopathy. Left cervical: No superficial, deep or posterior cervical adenopathy. Skin:     General: Skin is warm and dry. Neurological:      Mental Status: She is alert and oriented to person, place, and time. Cranial Nerves: No cranial nerve deficit. Coordination: Coordination normal.      Gait: Gait normal.   Psychiatric:         Thought Content: Thought content normal.         Procedure     Nasal Endoscopy    Preop:nasal congestion  Postop:same, deviated nasal septum, inferior turbinate hypertrophy  Anes: topical lidocaine with afrin  Consent: verbal  Indications: Anterior rhinoscopy insufficient to account for symptoms   Description:  After obtaining verbal consent from the patient 4% lidocaine with afrin was sprayed into the nasal cavities. After allowing a time for anesthesia, a nasal endoscope was placed into the naris. The septum, inferior, and middle turbinates were examined. The middle meatus, and sphenoethmoid recess was examined bilaterally. There were no complications. Pertinent positives included: There was not edema and purulence in the left middle meatus. There was not edema and purulence at the right middle meatus. There was not edema and purulence at the left sphenoethmoidal recess. There was not edema and purulence at the right sphenoethmoidal recess. Polyps were not identified. Masses were not identified. The bilateral inferior and middle turbinates were of normal appearance. Significant hypertrophy of bilateral inferior turbinates nonresponsive to Afrin. S-shaped septal deviation. Tolerated well without complication. Assessment and Plan     1. Deviated nasal septum    2. Nasal turbinate hypertrophy    Has tried Flonase for greater than 1 month without improvement in symptoms as well as multiple over-the-counter allergy medications and antibiotics. Findings significant for significant deviation of nasal septum and turbinate hypertrophy. Discussed options of septoplasty and submucosal turbinate reduction. Recovery and risk discussed risks include postoperative bleeding, radiation and septal perforation. Wishes to proceed. Understands she may also have chronic underlying allergies will need further treatment    3. Thyroid nodule  -Subcentimeter TR 5 nodule advised patient should have repeat ultrasound in 1 year    4. Velopharyngeal insufficiency (VPI), congenital  -Regurgitation of small items of food through her nose when swallowing  -Plan for speech eval after septal TURBs          Casie Storey DO  1/25/23      Portions of this note were dictated using Dragon.  There may be linguistic errors secondary to the use of this program.

## 2023-01-25 NOTE — LETTER
[facility]    Surgery Schedule Request Form: 01/25/23  Radha Gardner  DATE OF SURGERY: 1/30/23  TIME OF SURGERY:  1:00             CONF #: ____________________   Patient Information:  Patient name: Cecilia Ignacio    YOB: 1990 Age/Sex:32 y.o./female    SS #:[id number]    Wt Readings from Last 1 Encounters:   01/25/23 155 lb (70.3 kg)       Telephone Information:   Mobile [phone]     Home [phone]     Surgeon & Procedure Information:   Lead surgeon: Leandra Matute Co-Surgeon: no  Phone: [phone] Fax: [phone]  PCP: ROBE Shabazz CNP    Diagnosis: deviated nasal septum, J34.2  inferior turbinate hypertrophy  J34.3    Location: Any    Procedure name/CPT: Bilateral inferior turbinate reduction 60714-10 and Septoplasty 72970    Procedure length: 1 hour Anesthesia: General    Special Equipment: no    Patient Status: SDS (OP)    COVID Vacs:   Primary Payor Plan: [facility]  Member ID: [id number]   [facility] [address] name: Davis Mendez     [] Implement attached clinical orders for patient.       Electronically signed by Gisella Brice DO on 1/25/2023 at 11:29 AM

## 2023-01-26 ENCOUNTER — TELEPHONE (OUTPATIENT)
Dept: ENT CLINIC | Age: 33
End: 2023-01-26

## 2023-01-26 NOTE — TELEPHONE ENCOUNTER
Patient would like to schedule ENT surgery for 1/30 since she is still off work from her hysterectomy. Do you feel this is too soon? Also, does she need to stop her Vicodin?
Clothing//Cell Phone/PDA (specify)

## 2023-02-17 ENCOUNTER — TELEPHONE (OUTPATIENT)
Dept: ORTHOPEDIC SURGERY | Age: 33
End: 2023-02-17

## 2023-02-17 NOTE — TELEPHONE ENCOUNTER
General Question     Subject: EMG ORDER  Patient and /or Facility Request: Victoria Dial  Contact Number: [phone]      THE PT IS ASKING IF SHE CAN GET AN ORDER FOR AN EMG ON HER LUMBAR AND LEFT LEG SO SHE CAN HAVE IT DONE BEFORE  HER APPT IN [facility]

## 2023-03-15 ENCOUNTER — ANESTHESIA EVENT (OUTPATIENT)
Dept: OPERATING ROOM | Age: 33
End: 2023-03-15
Payer: COMMERCIAL

## 2023-03-16 NOTE — PROGRESS NOTES

## 2023-03-21 ENCOUNTER — TELEPHONE (OUTPATIENT)
Dept: ENT CLINIC | Age: 33
End: 2023-03-21

## 2023-03-23 ENCOUNTER — HOSPITAL ENCOUNTER (OUTPATIENT)
Age: 33
Setting detail: OUTPATIENT SURGERY
Discharge: HOME | End: 2023-03-23
Attending: OTOLARYNGOLOGY | Admitting: OTOLARYNGOLOGY
Payer: COMMERCIAL

## 2023-03-23 ENCOUNTER — ANESTHESIA (OUTPATIENT)
Dept: OPERATING ROOM | Age: 33
End: 2023-03-23
Payer: COMMERCIAL

## 2023-03-23 VITALS
RESPIRATION RATE: 16 BRPM | BODY MASS INDEX: 30.63 KG/M2 | SYSTOLIC BLOOD PRESSURE: 155 MMHG | HEIGHT: 60 IN | WEIGHT: 156 LBS | TEMPERATURE: 97.3 F | DIASTOLIC BLOOD PRESSURE: 83 MMHG | OXYGEN SATURATION: 98 % | HEART RATE: 61 BPM

## 2023-03-23 DIAGNOSIS — G89.18 POST-OP PAIN: Primary | ICD-10-CM

## 2023-03-23 PROBLEM — J34.3 HYPERTROPHY OF INFERIOR NASAL TURBINATE: Status: ACTIVE | Noted: 2023-03-23

## 2023-03-23 PROBLEM — J34.2 DEVIATED NASAL SEPTUM: Status: ACTIVE | Noted: 2023-03-23

## 2023-03-23 LAB
DEPRECATED RDW RBC AUTO: 12.7 % (ref 12.4–15.4)
HCT VFR BLD AUTO: 42.8 % (ref 36–48)
HGB BLD-MCNC: 14.4 G/DL (ref 12–16)
MCH RBC QN AUTO: 28.5 PG (ref 26–34)
MCHC RBC AUTO-ENTMCNC: 33.7 G/DL (ref 31–36)
MCV RBC AUTO: 84.7 FL (ref 80–100)
PLATELET # BLD AUTO: 223 K/UL (ref 135–450)
PMV BLD AUTO: 7.9 FL (ref 5–10.5)
RBC # BLD AUTO: 5.06 M/UL (ref 4–5.2)
WBC # BLD AUTO: 5.2 K/UL (ref 4–11)

## 2023-03-23 PROCEDURE — 3600000012 HC SURGERY LEVEL 2 ADDTL 15MIN: Performed by: OTOLARYNGOLOGY

## 2023-03-23 PROCEDURE — 2580000003 HC RX 258: Performed by: ANESTHESIOLOGY

## 2023-03-23 PROCEDURE — 2720000010 HC SURG SUPPLY STERILE: Performed by: OTOLARYNGOLOGY

## 2023-03-23 PROCEDURE — 2500000003 HC RX 250 WO HCPCS: Performed by: NURSE ANESTHETIST, CERTIFIED REGISTERED

## 2023-03-23 PROCEDURE — 85027 COMPLETE CBC AUTOMATED: CPT

## 2023-03-23 PROCEDURE — 6360000002 HC RX W HCPCS: Performed by: NURSE ANESTHETIST, CERTIFIED REGISTERED

## 2023-03-23 PROCEDURE — 2709999900 HC NON-CHARGEABLE SUPPLY: Performed by: OTOLARYNGOLOGY

## 2023-03-23 PROCEDURE — A4217 STERILE WATER/SALINE, 500 ML: HCPCS | Performed by: OTOLARYNGOLOGY

## 2023-03-23 PROCEDURE — 7100000001 HC PACU RECOVERY - ADDTL 15 MIN: Performed by: OTOLARYNGOLOGY

## 2023-03-23 PROCEDURE — 36415 COLL VENOUS BLD VENIPUNCTURE: CPT

## 2023-03-23 PROCEDURE — 30520 REPAIR OF NASAL SEPTUM: CPT | Performed by: OTOLARYNGOLOGY

## 2023-03-23 PROCEDURE — 3700000000 HC ANESTHESIA ATTENDED CARE: Performed by: OTOLARYNGOLOGY

## 2023-03-23 PROCEDURE — 6370000000 HC RX 637 (ALT 250 FOR IP): Performed by: OTOLARYNGOLOGY

## 2023-03-23 PROCEDURE — 3700000001 HC ADD 15 MINUTES (ANESTHESIA): Performed by: OTOLARYNGOLOGY

## 2023-03-23 PROCEDURE — 3600000002 HC SURGERY LEVEL 2 BASE: Performed by: OTOLARYNGOLOGY

## 2023-03-23 PROCEDURE — 2500000003 HC RX 250 WO HCPCS: Performed by: OTOLARYNGOLOGY

## 2023-03-23 PROCEDURE — 6360000002 HC RX W HCPCS: Performed by: ANESTHESIOLOGY

## 2023-03-23 PROCEDURE — 7100000010 HC PHASE II RECOVERY - FIRST 15 MIN: Performed by: OTOLARYNGOLOGY

## 2023-03-23 PROCEDURE — 2580000003 HC RX 258: Performed by: NURSE ANESTHETIST, CERTIFIED REGISTERED

## 2023-03-23 PROCEDURE — 7100000000 HC PACU RECOVERY - FIRST 15 MIN: Performed by: OTOLARYNGOLOGY

## 2023-03-23 PROCEDURE — 2500000003 HC RX 250 WO HCPCS: Performed by: ANESTHESIOLOGY

## 2023-03-23 PROCEDURE — 7100000011 HC PHASE II RECOVERY - ADDTL 15 MIN: Performed by: OTOLARYNGOLOGY

## 2023-03-23 PROCEDURE — 30140 RESECT INFERIOR TURBINATE: CPT | Performed by: OTOLARYNGOLOGY

## 2023-03-23 PROCEDURE — 2580000003 HC RX 258: Performed by: OTOLARYNGOLOGY

## 2023-03-23 RX ORDER — OXYMETAZOLINE HYDROCHLORIDE 0.05 G/100ML
SPRAY NASAL PRN
Status: DISCONTINUED | OUTPATIENT
Start: 2023-03-23 | End: 2023-03-23 | Stop reason: ALTCHOICE

## 2023-03-23 RX ORDER — SODIUM CHLORIDE 0.9 % (FLUSH) 0.9 %
5-40 SYRINGE (ML) INJECTION EVERY 12 HOURS SCHEDULED
Status: DISCONTINUED | OUTPATIENT
Start: 2023-03-23 | End: 2023-03-23 | Stop reason: HOSPADM

## 2023-03-23 RX ORDER — ONDANSETRON 2 MG/ML
4 INJECTION INTRAMUSCULAR; INTRAVENOUS
Status: DISCONTINUED | OUTPATIENT
Start: 2023-03-23 | End: 2023-03-23 | Stop reason: HOSPADM

## 2023-03-23 RX ORDER — MIDAZOLAM HYDROCHLORIDE 1 MG/ML
INJECTION INTRAMUSCULAR; INTRAVENOUS PRN
Status: DISCONTINUED | OUTPATIENT
Start: 2023-03-23 | End: 2023-03-23 | Stop reason: SDUPTHER

## 2023-03-23 RX ORDER — SODIUM CHLORIDE 0.9 % (FLUSH) 0.9 %
5-40 SYRINGE (ML) INJECTION EVERY 12 HOURS SCHEDULED
Status: DISCONTINUED | OUTPATIENT
Start: 2023-03-23 | End: 2023-03-23 | Stop reason: SDUPTHER

## 2023-03-23 RX ORDER — ONDANSETRON 2 MG/ML
INJECTION INTRAMUSCULAR; INTRAVENOUS PRN
Status: DISCONTINUED | OUTPATIENT
Start: 2023-03-23 | End: 2023-03-23 | Stop reason: SDUPTHER

## 2023-03-23 RX ORDER — SODIUM CHLORIDE 0.9 % (FLUSH) 0.9 %
5-40 SYRINGE (ML) INJECTION PRN
Status: DISCONTINUED | OUTPATIENT
Start: 2023-03-23 | End: 2023-03-23 | Stop reason: HOSPADM

## 2023-03-23 RX ORDER — MEPERIDINE HYDROCHLORIDE 25 MG/ML
12.5 INJECTION INTRAMUSCULAR; INTRAVENOUS; SUBCUTANEOUS EVERY 5 MIN PRN
Status: DISCONTINUED | OUTPATIENT
Start: 2023-03-23 | End: 2023-03-23 | Stop reason: HOSPADM

## 2023-03-23 RX ORDER — MAGNESIUM HYDROXIDE 1200 MG/15ML
LIQUID ORAL CONTINUOUS PRN
Status: COMPLETED | OUTPATIENT
Start: 2023-03-23 | End: 2023-03-23

## 2023-03-23 RX ORDER — SODIUM CHLORIDE 0.9 % (FLUSH) 0.9 %
5-40 SYRINGE (ML) INJECTION PRN
Status: DISCONTINUED | OUTPATIENT
Start: 2023-03-23 | End: 2023-03-23 | Stop reason: SDUPTHER

## 2023-03-23 RX ORDER — SODIUM CHLORIDE, SODIUM LACTATE, POTASSIUM CHLORIDE, CALCIUM CHLORIDE 600; 310; 30; 20 MG/100ML; MG/100ML; MG/100ML; MG/100ML
INJECTION, SOLUTION INTRAVENOUS CONTINUOUS PRN
Status: DISCONTINUED | OUTPATIENT
Start: 2023-03-23 | End: 2023-03-23 | Stop reason: SDUPTHER

## 2023-03-23 RX ORDER — SODIUM CHLORIDE 9 MG/ML
INJECTION, SOLUTION INTRAVENOUS PRN
Status: DISCONTINUED | OUTPATIENT
Start: 2023-03-23 | End: 2023-03-23 | Stop reason: HOSPADM

## 2023-03-23 RX ORDER — OXYCODONE HYDROCHLORIDE 5 MG/1
5 TABLET ORAL PRN
Status: DISCONTINUED | OUTPATIENT
Start: 2023-03-23 | End: 2023-03-23

## 2023-03-23 RX ORDER — FENTANYL CITRATE 50 UG/ML
INJECTION, SOLUTION INTRAMUSCULAR; INTRAVENOUS PRN
Status: DISCONTINUED | OUTPATIENT
Start: 2023-03-23 | End: 2023-03-23 | Stop reason: SDUPTHER

## 2023-03-23 RX ORDER — PROPOFOL 10 MG/ML
INJECTION, EMULSION INTRAVENOUS PRN
Status: DISCONTINUED | OUTPATIENT
Start: 2023-03-23 | End: 2023-03-23 | Stop reason: SDUPTHER

## 2023-03-23 RX ORDER — ROCURONIUM BROMIDE 10 MG/ML
INJECTION, SOLUTION INTRAVENOUS PRN
Status: DISCONTINUED | OUTPATIENT
Start: 2023-03-23 | End: 2023-03-23 | Stop reason: SDUPTHER

## 2023-03-23 RX ORDER — SODIUM CHLORIDE, SODIUM LACTATE, POTASSIUM CHLORIDE, CALCIUM CHLORIDE 600; 310; 30; 20 MG/100ML; MG/100ML; MG/100ML; MG/100ML
INJECTION, SOLUTION INTRAVENOUS CONTINUOUS
Status: DISCONTINUED | OUTPATIENT
Start: 2023-03-23 | End: 2023-03-23 | Stop reason: HOSPADM

## 2023-03-23 RX ORDER — FAMOTIDINE 10 MG/ML
20 INJECTION, SOLUTION INTRAVENOUS ONCE
Status: COMPLETED | OUTPATIENT
Start: 2023-03-23 | End: 2023-03-23

## 2023-03-23 RX ORDER — TRAMADOL HYDROCHLORIDE 50 MG/1
50 TABLET ORAL EVERY 4 HOURS PRN
Qty: 18 TABLET | Refills: 0 | Status: SHIPPED | OUTPATIENT
Start: 2023-03-23 | End: 2023-03-26

## 2023-03-23 RX ORDER — SODIUM CHLORIDE 9 MG/ML
25 INJECTION, SOLUTION INTRAVENOUS PRN
Status: DISCONTINUED | OUTPATIENT
Start: 2023-03-23 | End: 2023-03-23 | Stop reason: SDUPTHER

## 2023-03-23 RX ORDER — CEPHALEXIN 500 MG/1
500 CAPSULE ORAL 3 TIMES DAILY
Qty: 15 CAPSULE | Refills: 0 | Status: SHIPPED | OUTPATIENT
Start: 2023-03-23 | End: 2023-03-28

## 2023-03-23 RX ORDER — DEXAMETHASONE SODIUM PHOSPHATE 4 MG/ML
INJECTION, SOLUTION INTRA-ARTICULAR; INTRALESIONAL; INTRAMUSCULAR; INTRAVENOUS; SOFT TISSUE PRN
Status: DISCONTINUED | OUTPATIENT
Start: 2023-03-23 | End: 2023-03-23 | Stop reason: SDUPTHER

## 2023-03-23 RX ORDER — LIDOCAINE HYDROCHLORIDE AND EPINEPHRINE 10; 10 MG/ML; UG/ML
INJECTION, SOLUTION INFILTRATION; PERINEURAL PRN
Status: DISCONTINUED | OUTPATIENT
Start: 2023-03-23 | End: 2023-03-23 | Stop reason: ALTCHOICE

## 2023-03-23 RX ORDER — OXYCODONE HYDROCHLORIDE 5 MG/1
10 TABLET ORAL PRN
Status: DISCONTINUED | OUTPATIENT
Start: 2023-03-23 | End: 2023-03-23

## 2023-03-23 RX ADMIN — HYDROMORPHONE HYDROCHLORIDE 0.5 MG: 1 INJECTION, SOLUTION INTRAMUSCULAR; INTRAVENOUS; SUBCUTANEOUS at 10:39

## 2023-03-23 RX ADMIN — ROCURONIUM BROMIDE 50 MG: 10 INJECTION, SOLUTION INTRAVENOUS at 08:38

## 2023-03-23 RX ADMIN — ONDANSETRON HYDROCHLORIDE 4 MG: 2 INJECTION, SOLUTION INTRAMUSCULAR; INTRAVENOUS at 08:38

## 2023-03-23 RX ADMIN — FAMOTIDINE 20 MG: 10 INJECTION, SOLUTION INTRAVENOUS at 07:46

## 2023-03-23 RX ADMIN — SODIUM CHLORIDE, POTASSIUM CHLORIDE, SODIUM LACTATE AND CALCIUM CHLORIDE: 600; 310; 30; 20 INJECTION, SOLUTION INTRAVENOUS at 08:35

## 2023-03-23 RX ADMIN — FENTANYL CITRATE 50 MCG: 50 INJECTION INTRAMUSCULAR; INTRAVENOUS at 08:38

## 2023-03-23 RX ADMIN — SUGAMMADEX 200 MG: 100 INJECTION, SOLUTION INTRAVENOUS at 09:24

## 2023-03-23 RX ADMIN — MIDAZOLAM 2 MG: 1 INJECTION INTRAMUSCULAR; INTRAVENOUS at 08:35

## 2023-03-23 RX ADMIN — SODIUM CHLORIDE, POTASSIUM CHLORIDE, SODIUM LACTATE AND CALCIUM CHLORIDE: 600; 310; 30; 20 INJECTION, SOLUTION INTRAVENOUS at 07:47

## 2023-03-23 RX ADMIN — HYDROMORPHONE HYDROCHLORIDE 0.5 MG: 1 INJECTION, SOLUTION INTRAMUSCULAR; INTRAVENOUS; SUBCUTANEOUS at 10:20

## 2023-03-23 RX ADMIN — HYDROMORPHONE HYDROCHLORIDE 0.5 MG: 1 INJECTION, SOLUTION INTRAMUSCULAR; INTRAVENOUS; SUBCUTANEOUS at 10:05

## 2023-03-23 RX ADMIN — FENTANYL CITRATE 50 MCG: 50 INJECTION INTRAMUSCULAR; INTRAVENOUS at 08:50

## 2023-03-23 RX ADMIN — DEXAMETHASONE SODIUM PHOSPHATE 4 MG: 4 INJECTION, SOLUTION INTRAMUSCULAR; INTRAVENOUS at 08:38

## 2023-03-23 RX ADMIN — PROPOFOL 200 MG: 10 INJECTION, EMULSION INTRAVENOUS at 08:38

## 2023-03-23 ASSESSMENT — PAIN DESCRIPTION - PAIN TYPE
TYPE: SURGICAL PAIN

## 2023-03-23 ASSESSMENT — PAIN DESCRIPTION - LOCATION
LOCATION: NOSE

## 2023-03-23 ASSESSMENT — LIFESTYLE VARIABLES: SMOKING_STATUS: 0

## 2023-03-23 ASSESSMENT — PAIN SCALES - GENERAL
PAINLEVEL_OUTOF10: 7
PAINLEVEL_OUTOF10: 8
PAINLEVEL_OUTOF10: 7

## 2023-03-23 ASSESSMENT — ENCOUNTER SYMPTOMS: SHORTNESS OF BREATH: 0

## 2023-03-23 ASSESSMENT — PAIN - FUNCTIONAL ASSESSMENT
PAIN_FUNCTIONAL_ASSESSMENT: NONE - DENIES PAIN
PAIN_FUNCTIONAL_ASSESSMENT: NONE - DENIES PAIN

## 2023-03-23 NOTE — OP NOTE
transfixion suture of 4-0 nylon. Next the patients face was washed and a moustache dressing applied. The patient was then awakened and extubated without incident, and taken to recovery in stable condition. I was present for and performed all critical portions of the case.     Remberto Ramsey DO         Electronically signed by Remberto Ramsey DO on 3/23/2023 at 9:29 AM

## 2023-03-23 NOTE — PROGRESS NOTES
Discharge instructions given to patient and family. Verbalizes understanding. Pt states pain is tolerable 2/10 and wants to leave so she can go to another appointment today at [facility]. Pt discharged to home in stable condition.

## 2023-03-23 NOTE — ANESTHESIA POSTPROCEDURE EVALUATION
Department of Anesthesiology  Postprocedure Note    Patient: Kevin Guerra  MRN: [id number]  YOB: 1990  Date of evaluation: 3/23/2023      Procedure Summary     Date: 03/23/23 Room / Location: [address] [place] [address]    Anesthesia Start: 0546 Anesthesia Stop: 7557    Procedure: BILATERAL INFERIOR TURBINATE REDUCTION, SEPTOPLASTY (Bilateral: Nose) Diagnosis:       Deviated nasal septum      Hypertrophy of inferior nasal turbinate      (Deviated nasal septum [J34.2])      (Hypertrophy of inferior nasal turbinate [J34.3])    Surgeons: Travis Kirkpatrick DO Responsible Provider: Jayant Hutton MD    Anesthesia Type: general ASA Status: 2          Anesthesia Type: No value filed.     Sebastian Phase I: Sebastian Score: 10    Sebastian Phase II: Sebastian Score: 10    Vitals:    03/23/23 1000 03/23/23 1015 03/23/23 1030 03/23/23 1045   BP: (!) 155/92 (!) 155/92 (!) 153/89 (!) 155/83   Pulse: 62 56 (!) 49 61   Resp: 16 16 16 16   Temp:   97.3 °F (36.3 °C)    TempSrc:   Temporal    SpO2: 98% 96% 99% 98%   Weight:       Height:         Anesthesia Post Evaluation    Patient location during evaluation: bedside  Patient participation: complete - patient participated  Level of consciousness: awake and alert  Airway patency: patent  Nausea & Vomiting: no nausea  Complications: no  Cardiovascular status: hemodynamically stable  Respiratory status: acceptable  Hydration status: euvolemic

## 2023-03-23 NOTE — ANESTHESIA PRE PROCEDURE
STAFF ADDENDUM:    Pt seen and examined, chart reviewed (including anesthesia, drug and allergy history). No interval changes to history and physical examination. Anesthetic plan, risks, benefits, alternatives, and personnel involved discussed with patient. Patient verbalized an understanding and agrees to proceed.       Nikki Ferrer MD  March 23, 2023  7:22 AM    Nikki Ferrer MD   3/23/2023

## 2023-03-23 NOTE — DISCHARGE INSTRUCTIONS
answering service. If you need IMMEDIATE ATTENTION, come to [address] [place] [address]. SPECIFIC COMPLICATIONS TO WATCH FOR:   · Inability to urinate   · Fever over 101° F by mouth   · Nausea and vomiting lasting longer than 24 hours. · Pain not relieved by medication ordered   · Swelling around the operative site   · Increased redness, warmth, hardness, around operative area   · Numbness, tingling, or cold fingers or toes   · Blood -soaked dressing, (small amounts of oozing may be normal)   · Increasing and progressive drainage from surgical area or exam site   · Increased redness, warmth, or hardness around incision or exam site   A follow up call will be attempted by a recovery room nurse in 24 - 48 hours to check on your progress. If you have any questions call your doctor. ANESTHESIA DISCHARGE INSTRUCTIONS    You are under the influence of drugs- do not drink alcohol, drive a car, operate machinery(such as power tools, kitchen appliances, etc), sign legal documents, or make any important decisions for 24 hours (or while on pain medications). Children should not ride bikes or [place] or play on gym sets  for 24 hours after surgery. A responsible adult should be with you for 24 hours. Rest at home today- increase activity as tolerated. Progress slowly to a regular diet unless your physician has instructed you otherwise. Drink plenty of water. CALL YOUR DOCTOR IF YOU:  Have moderate to severe nausea or vomiting AND are unable to hold down fluids or prescribed medications. Have bright red bloody drainage from your dressing that won't stop oozing.   Do not get relief with your pain medication    NORMAL (POSSIBLE) SIDE EFFECTS FROM ANESTHESIA:     Confusion, temporary memory loss, delayed reaction times in the first 24 hours  Lightheadedness, dizziness, difficulty focusing, blurred vision  Nausea/vomiting can happen  Shivering, feeling cold,

## 2023-03-23 NOTE — PROGRESS NOTES
Arrived from OR. Report from [facility] and CRNA. Vital signs stable . Will monitor.

## 2023-03-30 ENCOUNTER — OFFICE VISIT (OUTPATIENT)
Dept: ENT CLINIC | Age: 33
End: 2023-03-30
Payer: COMMERCIAL

## 2023-03-30 VITALS — OXYGEN SATURATION: 98 % | HEIGHT: 60 IN | TEMPERATURE: 97.6 F | BODY MASS INDEX: 30.63 KG/M2 | WEIGHT: 156 LBS

## 2023-03-30 DIAGNOSIS — J34.3 NASAL TURBINATE HYPERTROPHY: ICD-10-CM

## 2023-03-30 DIAGNOSIS — J30.9 ALLERGIC RHINITIS, UNSPECIFIED SEASONALITY, UNSPECIFIED TRIGGER: ICD-10-CM

## 2023-03-30 DIAGNOSIS — J34.2 DEVIATED NASAL SEPTUM: Primary | ICD-10-CM

## 2023-03-30 PROCEDURE — 95004 PERQ TESTS W/ALRGNC XTRCS: CPT | Performed by: OTOLARYNGOLOGY

## 2023-03-30 PROCEDURE — 99024 POSTOP FOLLOW-UP VISIT: CPT | Performed by: OTOLARYNGOLOGY

## 2023-03-30 PROCEDURE — 95028 IQ TSTS ALLERGY DELAYED RXN: CPT | Performed by: OTOLARYNGOLOGY

## 2023-03-30 NOTE — PROGRESS NOTES
PhysicalExam     Vitals:    03/30/23 1038   Temp: 97.6 °F (36.4 °C)   TempSrc: Infrared   SpO2: 98%   Weight: 156 lb (70.8 kg)   Height: 5' (1.524 m)       Nasal splints removed  Septum midline without perforation  Turbinates reduced and lateralized      Assessment and Plan     1. Deviated nasal septum    2. Nasal turbinate hypertrophy    3. Allergic Rhinitis      Status post septoplasty biopsy mucosal turbinate reduction doing well. Significantly improved nasal airway. Discussed ongoing allergy management plan for allergy testing. Mary Lou Coker DO  3/30/23      Portions of this note were dictated using Dragon.  There may be linguistic errors secondary to the use of this program.

## 2023-03-31 ENCOUNTER — TELEPHONE (OUTPATIENT)
Dept: ENT CLINIC | Age: 33
End: 2023-03-31

## 2023-03-31 NOTE — TELEPHONE ENCOUNTER
Spoke with patient and discussed Allergy Testing and procedure with expectations. She reported not taking any over the counter or prescribed allergy medicines \"they do not work\". Emailed Welcome Packet to review and contact insurance for coverage. Encouraged her to complete the history form and to review any medications to stop  if starting some before testing is done. Confirmed and scheduled for Allergy Testing   April 18th 10 am @ the [facility] location.

## 2023-04-18 ENCOUNTER — NURSE ONLY (OUTPATIENT)
Dept: ENT CLINIC | Age: 33
End: 2023-04-18
Payer: COMMERCIAL

## 2023-04-18 VITALS
DIASTOLIC BLOOD PRESSURE: 70 MMHG | HEART RATE: 58 BPM | TEMPERATURE: 97.9 F | RESPIRATION RATE: 20 BRPM | SYSTOLIC BLOOD PRESSURE: 109 MMHG

## 2023-04-18 DIAGNOSIS — J30.9 ALLERGIC RHINITIS, UNSPECIFIED SEASONALITY, UNSPECIFIED TRIGGER: Primary | ICD-10-CM

## 2023-04-18 PROCEDURE — 95004 PERQ TESTS W/ALRGNC XTRCS: CPT | Performed by: STUDENT IN AN ORGANIZED HEALTH CARE EDUCATION/TRAINING PROGRAM

## 2023-04-18 PROCEDURE — 95024 IQ TESTS W/ALLERGENIC XTRCS: CPT | Performed by: STUDENT IN AN ORGANIZED HEALTH CARE EDUCATION/TRAINING PROGRAM

## 2023-04-18 NOTE — PROGRESS NOTES
Allergy Testing Note        After obtaining consent, and per orders of Dr Arthur Rodriguez, injections of allergy serum given per documentation below by Tristin Mancera RN. Test Information  Consent: Yes  Time Antigens Placed: 2013 (following skin test)  Location: Arm (Right)  Allergen : ALK Aldo  Testing Nurse:  PATRICE Jansen  Reviewing Physician: Dr. Bam Servin (Dr. Arthur Rodriguez out of office Dr. Bam Servin will review on her behalf)  Amount Injected (mL): 0.01    Controls  Negative 0.05% Glycerine-Saline: Not tested  Positive Histamine 1 mg/ml: Not tested    Juana Jayme: Not tested  Paper Madolyn Curls: Not tested  Box Elder: 0  Red Runnels: 0  American Elm: 0  Lakeland: Not tested  Geoffrey Laura: Not tested  Sugar Maple: Not tested  [employer]Zone [place]: 0  Bur [place]: Not tested  y prime Corporation: Not tested  Sealed Air Corporation: Not tested  Genomic Vision: 0  Black Pl[employer]uit [employer]: Not tested  Geoffrey Pecatonica: Not tested  ToysRus: 4+  [employer] Madolyn Curls: 4+  Claunch Eastern: 0  Houston/Pecan Mix: 3+  Red Maple: 0  White Oak: 0    Others  American American Family Insurance Mite: 4+  Dog Dander: 4+  Cat Dander:  (EP =5)  Feather (Mixed): 3+  Cockroach: 0    Grasses  Ky Bluegrass: Not tested  Smooth Brome: Not tested  Moldova: Not tested  Stayton's Pride Fescue: Not tested  Brink's Company:  (EP = 6)  Luxembourg [place]: Not tested  Lindbergh Plough: 4+  Bermuda: 4+  Jeremy:  (EP=6)    Weeds  Cocklebur: Not tested  Engine Ecology Quarter: 4+  Burweed Common: Not tested  Mugwort: Not tested  English Plantain: 0  Giant Ragweed: Not tested  Short Ragweed: Not tested  Sheep Sorrel: 3+  Kochia: 0  Red Root Pigweed:  (Rough Pigweed EP=0)  Mixed Ragweed: 0    Molds/Fungi  Alternaria Hormodendrum: 0  Dreschlera: Not tested  Epicoccum Nigrum: 0  Epidermorphyto Floccosum: Not tested  Fusarium Moniliforme: Not tested  Fusarium Solani: Not tested  Geotrichum Candidum: Not tested  Gliocladium: Not tested  Deliquescence: Not tested  Spondylocladium: Not tested  Atrovirens Microsporum: Not

## 2023-05-17 NOTE — PROGRESS NOTES
[address], [address], [address] [place] [address]  P: [phone]       Patient     Garland Flight  1990    ChiefComplaint     Chief Complaint   Patient presents with    Follow-up     Patient is here today for a follow up after allergy testing. Patient has tried everything OTC and nothing works. Patient states she does not have time to come in every week for injections. Patient states her nose is still hurting on left side, she has 2 bumps on the inside left nostril. Patient states the surgery did not help her. History of Present Illness     Chelsie Toney is a 16-year-old female 1 week status post septoplasty bilateral submucosal turbinate reduction. Uncomplicated postop course. Interval History 5/18/2023  Allergy testing performed and showed multiple allergens. Septoplasty was performed on March 23, 2023 by Dr. Brunilda Bull. She continues to have some nasal congestion and is unable to commit to allergy shots at this point in time.   She is currently not taking any allergy medications    Past Medical History     Past Medical History:   Diagnosis Date    Asthma     as a child    Bacterial vaginosis     Chlamydia     Depression     as adolescent    Factor 5 Leiden mutation, heterozygous ([address]     Sciatic pain      Past Surgical History     Past Surgical History:   Procedure Laterality Date    EPIDURAL STEROID INJECTION Bilateral 06/04/2019    BILATERAL LUMBAR FOUR TRANSFORAMINAL EPIDURAL STEROID INJECTION SITE CONFIRMED BY FLUOROSCOPY performed by Moisés Rios MD at [facility] N/A 10/28/2020    HEMORRHOID SURGERY N/A 10/28/2020    HEMORRHOIDECTOMY performed by Fernando Monte MD at [address])      LUMBAR SPINE SURGERY N/A 12/20/2019    MICROLUMBAR DISCECTOMY L5-S1 performed by Jeff Tripathi MD at [address] N/A 01/06/2021    MICROLUMBAR DISCECTOMY RE-EXPLORATION L4-5 performed by Brijesh SUMNER

## 2023-05-18 ENCOUNTER — OFFICE VISIT (OUTPATIENT)
Dept: ENT CLINIC | Age: 33
End: 2023-05-18
Payer: COMMERCIAL

## 2023-05-18 VITALS — HEIGHT: 60 IN | BODY MASS INDEX: 30.63 KG/M2 | TEMPERATURE: 97.9 F | WEIGHT: 156 LBS | OXYGEN SATURATION: 98 %

## 2023-05-18 DIAGNOSIS — J30.2 SEASONAL ALLERGIES: Primary | ICD-10-CM

## 2023-05-18 DIAGNOSIS — J34.2 DEVIATED NASAL SEPTUM: ICD-10-CM

## 2023-05-18 PROCEDURE — G8427 DOCREV CUR MEDS BY ELIG CLIN: HCPCS | Performed by: STUDENT IN AN ORGANIZED HEALTH CARE EDUCATION/TRAINING PROGRAM

## 2023-05-18 PROCEDURE — 99213 OFFICE O/P EST LOW 20 MIN: CPT | Performed by: STUDENT IN AN ORGANIZED HEALTH CARE EDUCATION/TRAINING PROGRAM

## 2023-05-18 PROCEDURE — G8417 CALC BMI ABV UP PARAM F/U: HCPCS | Performed by: STUDENT IN AN ORGANIZED HEALTH CARE EDUCATION/TRAINING PROGRAM

## 2023-05-18 PROCEDURE — 1036F TOBACCO NON-USER: CPT | Performed by: STUDENT IN AN ORGANIZED HEALTH CARE EDUCATION/TRAINING PROGRAM

## 2023-05-18 PROCEDURE — 31231 NASAL ENDOSCOPY DX: CPT | Performed by: STUDENT IN AN ORGANIZED HEALTH CARE EDUCATION/TRAINING PROGRAM

## 2023-05-18 RX ORDER — CETIRIZINE HYDROCHLORIDE 10 MG/1
10 TABLET ORAL NIGHTLY
Qty: 30 TABLET | Refills: 3 | Status: SHIPPED | OUTPATIENT
Start: 2023-05-18 | End: 2023-06-17

## 2023-05-18 RX ORDER — IBUPROFEN 600 MG/1
TABLET ORAL
COMMUNITY
Start: 2023-05-16

## 2023-05-18 RX ORDER — FLUTICASONE PROPIONATE 50 MCG
2 SPRAY, SUSPENSION (ML) NASAL 2 TIMES DAILY
Qty: 48 G | Refills: 3 | Status: SHIPPED | OUTPATIENT
Start: 2023-05-18

## 2023-05-18 RX ORDER — METHOCARBAMOL 750 MG/1
750 TABLET, FILM COATED ORAL 4 TIMES DAILY
Qty: 28 TABLET | Refills: 0 | COMMUNITY
Start: 2023-05-16 | End: 2023-05-23

## 2023-05-18 RX ORDER — ACETAMINOPHEN 325 MG/1
975 TABLET ORAL EVERY 8 HOURS
Qty: 63 TABLET | Refills: 0 | COMMUNITY
Start: 2023-05-16 | End: 2023-05-23

## 2023-06-19 ENCOUNTER — OFFICE VISIT (OUTPATIENT)
Dept: ENT CLINIC | Age: 33
End: 2023-06-19
Payer: COMMERCIAL

## 2023-06-19 VITALS — WEIGHT: 156 LBS | HEIGHT: 60 IN | BODY MASS INDEX: 30.63 KG/M2 | TEMPERATURE: 98 F | OXYGEN SATURATION: 98 %

## 2023-06-19 DIAGNOSIS — J34.2 DEVIATED NASAL SEPTUM: ICD-10-CM

## 2023-06-19 DIAGNOSIS — J30.2 SEASONAL ALLERGIES: Primary | ICD-10-CM

## 2023-06-19 PROCEDURE — G8417 CALC BMI ABV UP PARAM F/U: HCPCS | Performed by: STUDENT IN AN ORGANIZED HEALTH CARE EDUCATION/TRAINING PROGRAM

## 2023-06-19 PROCEDURE — 1036F TOBACCO NON-USER: CPT | Performed by: STUDENT IN AN ORGANIZED HEALTH CARE EDUCATION/TRAINING PROGRAM

## 2023-06-19 PROCEDURE — G8427 DOCREV CUR MEDS BY ELIG CLIN: HCPCS | Performed by: STUDENT IN AN ORGANIZED HEALTH CARE EDUCATION/TRAINING PROGRAM

## 2023-06-19 PROCEDURE — 99214 OFFICE O/P EST MOD 30 MIN: CPT | Performed by: STUDENT IN AN ORGANIZED HEALTH CARE EDUCATION/TRAINING PROGRAM

## 2023-06-19 RX ORDER — OLOPATADINE HYDROCHLORIDE AND MOMETASONE FUROATE 25; 665 UG/1; UG/1
1 SPRAY, METERED NASAL 2 TIMES DAILY
Qty: 29 G | Refills: 3 | Status: SHIPPED | OUTPATIENT
Start: 2023-06-19

## 2023-07-01 ENCOUNTER — HOSPITAL ENCOUNTER (OUTPATIENT)
Dept: ULTRASOUND IMAGING | Age: 33
Discharge: HOME | End: 2023-07-01
Payer: COMMERCIAL

## 2023-07-01 DIAGNOSIS — E04.1 THYROID NODULE: ICD-10-CM

## 2023-07-01 PROCEDURE — 76536 US EXAM OF HEAD AND NECK: CPT

## 2023-07-19 ENCOUNTER — OFFICE VISIT (OUTPATIENT)
Dept: ENT CLINIC | Age: 33
End: 2023-07-19
Payer: COMMERCIAL

## 2023-07-19 VITALS — WEIGHT: 156 LBS | TEMPERATURE: 98 F | HEIGHT: 60 IN | BODY MASS INDEX: 30.63 KG/M2 | OXYGEN SATURATION: 98 %

## 2023-07-19 DIAGNOSIS — R09.81 NASAL CONGESTION: ICD-10-CM

## 2023-07-19 DIAGNOSIS — J30.2 SEASONAL ALLERGIES: Primary | ICD-10-CM

## 2023-07-19 PROCEDURE — G8417 CALC BMI ABV UP PARAM F/U: HCPCS | Performed by: OTOLARYNGOLOGY

## 2023-07-19 PROCEDURE — 99213 OFFICE O/P EST LOW 20 MIN: CPT | Performed by: OTOLARYNGOLOGY

## 2023-07-19 PROCEDURE — G8427 DOCREV CUR MEDS BY ELIG CLIN: HCPCS | Performed by: OTOLARYNGOLOGY

## 2023-07-19 PROCEDURE — 1036F TOBACCO NON-USER: CPT | Performed by: OTOLARYNGOLOGY

## 2023-07-19 RX ORDER — DEXTROAMPHETAMINE SACCHARATE, AMPHETAMINE ASPARTATE MONOHYDRATE, DEXTROAMPHETAMINE SULFATE AND AMPHETAMINE SULFATE 3.75; 3.75; 3.75; 3.75 MG/1; MG/1; MG/1; MG/1
CAPSULE, EXTENDED RELEASE ORAL
COMMUNITY
Start: 2023-07-03

## 2023-07-19 ASSESSMENT — ENCOUNTER SYMPTOMS
VOICE CHANGE: 0
TROUBLE SWALLOWING: 0
EYE ITCHING: 0
COUGH: 0
SHORTNESS OF BREATH: 0
SORE THROAT: 0
SINUS PRESSURE: 0
FACIAL SWELLING: 0
APNEA: 0

## 2023-07-23 SDOH — HEALTH STABILITY: PHYSICAL HEALTH: ON AVERAGE, HOW MANY DAYS PER WEEK DO YOU ENGAGE IN MODERATE TO STRENUOUS EXERCISE (LIKE A BRISK WALK)?: 2 DAYS

## 2023-07-23 SDOH — HEALTH STABILITY: PHYSICAL HEALTH: ON AVERAGE, HOW MANY MINUTES DO YOU ENGAGE IN EXERCISE AT THIS LEVEL?: 30 MIN

## 2023-07-24 ENCOUNTER — OFFICE VISIT (OUTPATIENT)
Dept: ORTHOPEDIC SURGERY | Age: 33
End: 2023-07-24
Payer: COMMERCIAL

## 2023-07-24 VITALS — HEIGHT: 60 IN | WEIGHT: 156 LBS | BODY MASS INDEX: 30.63 KG/M2

## 2023-07-24 DIAGNOSIS — M51.36 DDD (DEGENERATIVE DISC DISEASE), LUMBAR: Primary | ICD-10-CM

## 2023-07-24 DIAGNOSIS — M48.061 FORAMINAL STENOSIS OF LUMBAR REGION: ICD-10-CM

## 2023-07-24 PROCEDURE — 1036F TOBACCO NON-USER: CPT | Performed by: PHYSICIAN ASSISTANT

## 2023-07-24 PROCEDURE — G8427 DOCREV CUR MEDS BY ELIG CLIN: HCPCS | Performed by: PHYSICIAN ASSISTANT

## 2023-07-24 PROCEDURE — G8417 CALC BMI ABV UP PARAM F/U: HCPCS | Performed by: PHYSICIAN ASSISTANT

## 2023-07-24 PROCEDURE — 99214 OFFICE O/P EST MOD 30 MIN: CPT | Performed by: PHYSICIAN ASSISTANT

## 2023-07-24 NOTE — PROGRESS NOTES
New Patient: LUMBAR SPINE    Referring Provider:  No ref. provider found    CHIEF COMPLAINT:    Chief Complaint   Patient presents with    Back Pain     lumbar       HISTORY OF PRESENT ILLNESS:       Ms. Tamika Lopez  is a pleasant 28 y.o. female who has a history of microlumbar discectomy 2019 by Dr. Sonia Goel at L4-5 and then again in February 2021 at L5-S1 here for evaluation regarding ongoing LBP and left leg pain. She states her pain continued after her surgery in 2021. She was seen and has been seen by Dr. Luba Renee who has performed several spinal injections including a recent nerve ablation with no significant improvement in her symptoms. She continues to complain of 7/10 constant low back pain with 3/10 left buttock pain with 6/10 left leg pain to her heel. She states that the pain is most apparent with sitting, standing, lying down and minimally improved with positional changes. She has had physical therapy in the past with some benefit and has had chiropractic care in the past with no durable benefit. She denies any current bowel or bladder dysfunction or saddle anesthesia. Patient states that the pain does not interfere with her sleep she states that she has several left leg spasms throughout the night. Pain Assessment  Location of Pain: Back  Location Modifiers: Other (Comment)  Severity of Pain: 3  Quality of Pain: Other (Comment)  Duration of Pain: Other (Comment)  Frequency of Pain: Other (Comment)  Aggravating Factors: Other (Comment)  Relieving Factors: Other (Comment)]  Current/Past Treatment:   Physical Therapy: In the past with benefit  Chiropractic:  In the past with benefit  Injection: Injections provided by Dr. Luba Renee with minimal benefit  Medications: Ibuprofen    Past Medical History:   Past Medical History:   Diagnosis Date    Asthma     as a child    Bacterial vaginosis     Chlamydia     Depression     as adolescent    Factor 5 Leiden mutation, heterozygous ([address])     Sciatic pain

## 2023-07-27 ENCOUNTER — TELEPHONE (OUTPATIENT)
Dept: ORTHOPEDIC SURGERY | Age: 33
End: 2023-07-27

## 2023-07-27 DIAGNOSIS — M48.061 FORAMINAL STENOSIS OF LUMBAR REGION: ICD-10-CM

## 2023-07-27 DIAGNOSIS — M51.36 DDD (DEGENERATIVE DISC DISEASE), LUMBAR: Primary | ICD-10-CM

## 2023-07-27 NOTE — TELEPHONE ENCOUNTER
Other PATIENT CALLED STATES THAT SHE IS HAVING ISSUES WITH SCHEDULING  [address]. PATIENT STATES THEY ARE REFUSING TO SCHEDULE WITH HER STATING THAT THEY NEED AND UPDATED MRI PER [facility]. PATIENT STATES THAT SHE SPOKE WITH [facility] AND THEY INFORMED HER THAT THIS WAS NOT THE CASE.  PATIENT WANTING TO KNOW WHAT SHE SHOULD DO. [facility] CALL TO ASSIST AND ADVISE [phone]

## 2023-07-28 ENCOUNTER — TELEPHONE (OUTPATIENT)
Dept: ORTHOPEDIC SURGERY | Age: 33
End: 2023-07-28

## 2023-07-28 NOTE — TELEPHONE ENCOUNTER
PATIENT WAS CALLED AND INFORMED MRI WAS APPROVED AND INSTRUCTIONS WERE GIVEN ON HOW TO SCHEDULE. PATIENT TO FOLLOW UP AFTER MRI. PATIENT VOICED UNDERSTANDING.

## 2023-08-07 ENCOUNTER — HOSPITAL ENCOUNTER (OUTPATIENT)
Dept: PHYSICAL THERAPY | Age: 33
Setting detail: THERAPIES SERIES
Discharge: HOME | End: 2023-08-07
Payer: COMMERCIAL

## 2023-08-07 PROCEDURE — 97161 PT EVAL LOW COMPLEX 20 MIN: CPT

## 2023-08-07 PROCEDURE — 97110 THERAPEUTIC EXERCISES: CPT

## 2023-08-07 NOTE — PLAN OF CARE
(typically 20 minutes face-to-face)  [] EVAL (MOD) 78344 (typically 30 minutes face-to-face)  [] EVAL (HIGH) 25297 (typically 45 minutes face-to-face)  [] RE-EVAL     PLAN: Begin PT focusing on:    stretching   Core stability    Frequency/Duration:  1-2 days per week for 4 Weeks:  Interventions:  [x]  Therapeutic exercise including: strength training, ROM, for LE, Glutes and core   [x]  NMR activation and proprioception for glutes , LE and Core   [x]  Manual therapy as indicated for Hip complex, LE and spine to include: Dry Needling/IASTM, STM, PROM, Gr I-IV mobilizations, manipulation. [x]  Modalities as needed that may include: thermal agents, E-stim, Biofeedback, US, iontophoresis as indicated  [x]  Patient education on joint protection, postural re-education, activity modification, progression of HEP. HEP instruction: Pt provided HEP via Truli     GOALS:  Patient stated goal: Patient Goals : strengthen lower back  [] Progressing: [] Met: [] Not Met: [] Adjusted      Therapist goals for Patient:   Short Term Goals: To be achieved in: 2 weeks  1. Independent in HEP and progression per patient tolerance, in order to prevent re-injury. [] Progressing: [] Met: [] Not Met: [] Adjusted  2. Patient will have a decrease in pain to facilitate improvement in movement, function, and ADLs as indicated by Functional Deficits. [] Progressing: [] Met: [] Not Met: [] Adjusted      Long Term Goals: To be achieved in: 4 weeks  1. Disability index score of 12 or less on the Oswestry to assist with reaching prior level of function. [] Progressing: [] Met: [] Not Met: [] Adjusted  2. Patient will demonstrate increased L-spine AROM to WNL to allow for proper joint functioning as indicated by patients Functional Deficits. [] Progressing: [] Met: [] Not Met: [] Adjusted  3.  Patient will demonstrate an increase in LLE Strength to 4+ or greater to allow for proper functional mobility as indicated by patients Functional

## 2023-08-07 NOTE — FLOWSHEET NOTE
Physical Therapy Daily Treatment Note    [x]Daily Tx Note    []Progress Note    [] Discharge Summary         Date:  2023    Patient Name:  Krish Dominique    :  1990  MRN: [id number]      Medical/Treatment Diagnosis Information:  Diagnosis: M51.36 (ICD-10-CM) - DDD (degenerative disc disease), lumbar  M48.061 (ICD-10-CM) - Foraminal stenosis of lumbar region  Treatment Diagnosis: increased pain, decreased lumbar AROM, difficulty with prolonged walking, decreased LLE strength    Insurance/Certification information:  PT Insurance Information: [facility]    Physician Information:  Referring Provider (secondary): Montana Izquierdo PA-C      Plan of care sent to provider:      []Faxed   [x]Co-signature    (attempts: 1[x] 23 2 []3[])     Plan of care signed :  []  Yes  [x] No      Date of Patient follow up with Physician:     Is this a Progress Report:     []  Yes  [x]  No      If Yes:  Date Range for reporting period:  Beginning 2023  Ending    Progress report will be due (10 Rx or 30 days whichever is less):     3/8/82  Recertification will be due (POC Duration  / 90 days whichever is less): 10/30/23      Visit # Insurance Allowable Auth Required   30 PT [x]  Yes []  No        Functional Scale:  Oswestry    2023: 22/50    Latex Allergy:  [x]NO      []YES  Preferred Language for Healthcare:   [x]English       []other:    RESTRICTIONS/PRECAUTIONS:  asthma    SUBJECTIVE:  See eval    Pain level:  2/10 low back      Plan Moving Forward/ For next visit:    stretching   Core stability      OBJECTIVE: See eval    Exercises/Interventions:     Exercises in bold performed in department today. Items not bolded are carried forward from prior visits for continuity of the record.   Exercise/Equipment Resistance/  level  Sets/sec Reps  HEP Notes     THERAPEUTIC EXERCISE    []    Nustep    []    IB  HR/TR    []    HS stretch    - step    - seated    - adduction  HF stretch    2 x 30\"  2 x

## 2023-08-23 ENCOUNTER — HOSPITAL ENCOUNTER (OUTPATIENT)
Dept: PHYSICAL THERAPY | Age: 33
Setting detail: THERAPIES SERIES
Discharge: HOME | End: 2023-08-23
Payer: COMMERCIAL

## 2023-08-23 PROCEDURE — 97112 NEUROMUSCULAR REEDUCATION: CPT

## 2023-08-23 PROCEDURE — 97140 MANUAL THERAPY 1/> REGIONS: CPT

## 2023-08-23 PROCEDURE — 97110 THERAPEUTIC EXERCISES: CPT

## 2023-08-23 NOTE — FLOWSHEET NOTE
[]    IB  HR/TR  2 x 30\"  2   10 []    HS stretch    - step    - seated    - adduction  HF stretch    2 x 30\"  2 x 30\"  2 x 30\"  2 x 30\"  [x]    Piriformis stretch  Figure 4 stretch    []    LTR    []    Low back stretch at // bar    []    Sciatic nv glide    - sitting    - supine    - step    2  2  2   10  10  10 [x]          []      THERAPEUTIC ACTIVITY    []    Steps    []    STS    []    Mini squat    []    gliders    []        []        []    GAIT     []        []        []        []        []      NEUROMUSCULAR RE-ED    []    TrA activation    []    SKFO Green  1 10 B []    Supine march Green  1 10 B []    Bridge Green  1 10 []    SB - orange    - DKTC    - LTR    1  1   20  10 []    Thread the needle    []    Open book    []    Paloff press    []    Sciatic nv glide    []        []    MANUAL    []    Ball belt traction    []    LE distraction    []      Therapeutic Exercise/Home Exercise Program:   20 minutes  Exercises  - Seated Hamstring Stretch  - 1 x daily - 7 x weekly - 2 sets - 30 sec hold  - Standing Hamstring Stretch with Step  - 1 x daily - 7 x weekly - 2 sets - 30 sec hold  - Standing Distal Sciatic Nerve Mobilization on Step  - 1 x daily - 7 x weekly - 2 sets - 10 reps  - Supine Sciatic Nerve Glide  - 1 x daily - 7 x weekly - 2 sets - 10 reps  - Seated Sciatic Tensioner  - 1 x daily - 7 x weekly - 2 sets - 10 reps  - Hooklying Clamshell with Resistance  - 1 x daily - 7 x weekly - 1 sets - 10 reps  - Supine March with Resistance Band  - 1 x daily - 7 x weekly - 1 sets - 10 reps  - Bridge with Resistance  - 1 x daily - 7 x weekly - 1 sets - 10 reps      Therapeutic Activity:  0 minutes     Gait: 0 minutes    Neuromuscular Re-Education:  10 minutes      Canalith Repositioning Procedure:      Manual Therapy:  10 minutes    Modalities: 0 minutes      ASSESSMENT:    Initiated spinal mobility and hip strengthening today.  Tolerated exeresis well, felt LLE weakness during supine exercises and some

## 2023-08-29 ENCOUNTER — HOSPITAL ENCOUNTER (OUTPATIENT)
Dept: PHYSICAL THERAPY | Age: 33
Setting detail: THERAPIES SERIES
Discharge: HOME | End: 2023-08-29
Payer: COMMERCIAL

## 2023-08-29 PROCEDURE — 97112 NEUROMUSCULAR REEDUCATION: CPT

## 2023-08-29 PROCEDURE — 97110 THERAPEUTIC EXERCISES: CPT

## 2023-08-29 PROCEDURE — 97140 MANUAL THERAPY 1/> REGIONS: CPT

## 2023-08-29 NOTE — FLOWSHEET NOTE
requires additional follow up with physician  [] Other    Prognosis for POC: [x] Good [] Fair  [] Poor    Patient requires continued skilled intervention: [x] Yes  [] No    Treatment/Activity Tolerance:  [x] Patient able to complete treatment  [] Patient limited by fatigue  [] Patient limited by pain    [] Patient limited by other medical complications  [] Other:         PLAN: See eval  [x] Continue per plan of care [] Alter current plan (see comments above)  [] Plan of care initiated [] Hold pending MD visit [] Discharge        Therapeutic Exercise and NMR EXR  [x] (55920) Provided verbal/tactile cueing for activities related to strengthening, flexibility, endurance, ROM  for improvements in proximal strength and core control with self care, mobility, lifting and ambulation. [x] (34084) Provided verbal/tactile cueing for activities related to improving balance, coordination, kinesthetic sense, posture, motor skill, proprioception  to assist with core control in self care, mobility, lifting, and ambulation.      Therapeutic Activities and Gait:    [] (45014 or 18325) Provided verbal/tactile cueing for activities related to improving balance, coordination, kinesthetic sense, posture, motor skill, proprioception and motor activation to allow for proper function  with self care and ADLs  [] (37463) Provided training and instruction to the patient for proper core and proximal hip recruitment and positioning with ambulation re-education     Home Exercise Program:    [x] (94369) Reviewed/Progressed HEP activities related to strengthening, flexibility, endurance, ROM of core, proximal hip and LE for functional self-care, mobility, lifting and ambulation   [] (26046) Reviewed/Progressed HEP activities related to improving balance, coordination, kinesthetic sense, posture, motor skill, proprioception of core, proximal hip and LE for self care, mobility, lifting, and ambulation      Manual Treatments:  PROM / STM /

## 2023-08-30 ENCOUNTER — APPOINTMENT (OUTPATIENT)
Dept: PHYSICAL THERAPY | Age: 33
End: 2023-08-30
Payer: COMMERCIAL

## 2023-09-06 ENCOUNTER — HOSPITAL ENCOUNTER (OUTPATIENT)
Dept: PHYSICAL THERAPY | Age: 33
Setting detail: THERAPIES SERIES
Discharge: HOME | End: 2023-09-06
Payer: COMMERCIAL

## 2023-09-06 PROCEDURE — 97530 THERAPEUTIC ACTIVITIES: CPT

## 2023-09-06 PROCEDURE — 97110 THERAPEUTIC EXERCISES: CPT

## 2023-09-13 ENCOUNTER — APPOINTMENT (OUTPATIENT)
Dept: PHYSICAL THERAPY | Age: 33
End: 2023-09-13
Payer: COMMERCIAL

## 2023-09-20 ENCOUNTER — HOSPITAL ENCOUNTER (OUTPATIENT)
Dept: PHYSICAL THERAPY | Age: 33
Setting detail: THERAPIES SERIES
Discharge: HOME | End: 2023-09-20
Payer: COMMERCIAL

## 2023-09-20 PROCEDURE — 97110 THERAPEUTIC EXERCISES: CPT

## 2023-09-20 PROCEDURE — 97140 MANUAL THERAPY 1/> REGIONS: CPT

## 2023-09-20 PROCEDURE — 97112 NEUROMUSCULAR REEDUCATION: CPT

## 2023-09-20 NOTE — FLOWSHEET NOTE
Hamstring Stretch  - 1 x daily - 7 x weekly - 2 sets - 30 sec hold  - Standing Hamstring Stretch with Step  - 1 x daily - 7 x weekly - 2 sets - 30 sec hold  - Standing Distal Sciatic Nerve Mobilization on Step  - 1 x daily - 7 x weekly - 2 sets - 10 reps  - Supine Sciatic Nerve Glide  - 1 x daily - 7 x weekly - 2 sets - 10 reps  - Seated Sciatic Tensioner  - 1 x daily - 7 x weekly - 2 sets - 10 reps  - Hooklying Clamshell with Resistance  - 1 x daily - 7 x weekly - 1 sets - 10 reps  - Supine March with Resistance Band  - 1 x daily - 7 x weekly - 1 sets - 10 reps  - Bridge with Resistance  - 1 x daily - 7 x weekly - 1 sets - 10 reps      Therapeutic Activity:  0 minutes     Gait: 0 minutes    Neuromuscular Re-Education:  8 minutes      Canalith Repositioning Procedure:      Manual Therapy:  15 minutes    Modalities: 0 minutes      ASSESSMENT:    Continued with stretching to alleiva increased tightness in BLE. Trial'd paloff press with weight for TrA activation. Felt more pull with stepping out to R. Plan to continue to work on spinal mobility and strengthening in order to manage LBP. Goals:   Patient stated goal: Patient Goals : strengthen lower back  [x] Progressing: [] Met: [] Not Met: [] Adjusted      Therapist goals for Patient:   Short Term Goals: To be achieved in: 2 weeks  1. Independent in HEP and progression per patient tolerance, in order to prevent re-injury. [x] Progressing: [] Met: [] Not Met: [] Adjusted  2. Patient will have a decrease in pain to facilitate improvement in movement, function, and ADLs as indicated by Functional Deficits. [x] Progressing: feels that it has decreased, but pain has changed [] Met: [] Not Met: [] Adjusted      Long Term Goals: To be achieved in: 4 weeks  1. Disability index score of 12 or less on the Oswestry to assist with reaching prior level of function. [x] Progressing: [] Met: [] Not Met: [] Adjusted  2.  Patient will demonstrate increased L-spine AROM to WNL

## 2023-09-27 ENCOUNTER — HOSPITAL ENCOUNTER (OUTPATIENT)
Dept: PHYSICAL THERAPY | Age: 33
Setting detail: THERAPIES SERIES
Discharge: HOME | End: 2023-09-27
Payer: COMMERCIAL

## 2023-09-27 PROCEDURE — 97530 THERAPEUTIC ACTIVITIES: CPT

## 2023-09-27 PROCEDURE — 97110 THERAPEUTIC EXERCISES: CPT

## 2024-01-23 RX ORDER — DOXYCYCLINE HYCLATE 100 MG
100 TABLET ORAL DAILY
COMMUNITY

## 2024-01-23 RX ORDER — HYDROXYZINE HYDROCHLORIDE 25 MG/1
25 TABLET, FILM COATED ORAL EVERY 8 HOURS PRN
COMMUNITY
Start: 2023-06-21

## 2024-01-23 RX ORDER — METHOCARBAMOL 500 MG/1
500 TABLET, FILM COATED ORAL PRN
Status: ON HOLD | COMMUNITY
Start: 2023-12-18 | End: 2024-01-31 | Stop reason: HOSPADM

## 2024-01-26 ENCOUNTER — ANESTHESIA EVENT (OUTPATIENT)
Dept: OPERATING ROOM | Age: 34
DRG: 304 | End: 2024-01-26
Payer: COMMERCIAL

## 2024-01-29 ENCOUNTER — APPOINTMENT (OUTPATIENT)
Dept: GENERAL RADIOLOGY | Age: 34
DRG: 304 | End: 2024-01-29
Attending: NEUROLOGICAL SURGERY
Payer: COMMERCIAL

## 2024-01-29 ENCOUNTER — ANESTHESIA (OUTPATIENT)
Dept: OPERATING ROOM | Age: 34
DRG: 304 | End: 2024-01-29
Payer: COMMERCIAL

## 2024-01-29 ENCOUNTER — HOSPITAL ENCOUNTER (INPATIENT)
Age: 34
LOS: 2 days | Discharge: HOME | DRG: 304 | End: 2024-01-31
Attending: NEUROLOGICAL SURGERY | Admitting: NEUROLOGICAL SURGERY
Payer: COMMERCIAL

## 2024-01-29 DIAGNOSIS — Z98.1 S/P LUMBAR AND LUMBOSACRAL FUSION BY ANTERIOR TECHNIQUE: Primary | ICD-10-CM

## 2024-01-29 PROBLEM — M48.062 LUMBAR STENOSIS WITH NEUROGENIC CLAUDICATION: Status: ACTIVE | Noted: 2024-01-29

## 2024-01-29 LAB
ABO + RH BLD: NORMAL
APTT BLD: 27.7 SEC (ref 22.7–35.9)
BLD GP AB SCN SERPL QL: NORMAL

## 2024-01-29 PROCEDURE — 6360000002 HC RX W HCPCS: Performed by: NEUROLOGICAL SURGERY

## 2024-01-29 PROCEDURE — 2709999900 HC NON-CHARGEABLE SUPPLY: Performed by: NEUROLOGICAL SURGERY

## 2024-01-29 PROCEDURE — 0SG30K1 FUSION OF LUMBOSACRAL JOINT WITH NONAUTOLOGOUS TISSUE SUBSTITUTE, POSTERIOR APPROACH, POSTERIOR COLUMN, OPEN APPROACH: ICD-10-PCS | Performed by: NEUROLOGICAL SURGERY

## 2024-01-29 PROCEDURE — 0SG00K1 FUSION OF LUMBAR VERTEBRAL JOINT WITH NONAUTOLOGOUS TISSUE SUBSTITUTE, POSTERIOR APPROACH, POSTERIOR COLUMN, OPEN APPROACH: ICD-10-PCS | Performed by: NEUROLOGICAL SURGERY

## 2024-01-29 PROCEDURE — 2580000003 HC RX 258: Performed by: ANESTHESIOLOGY

## 2024-01-29 PROCEDURE — 6370000000 HC RX 637 (ALT 250 FOR IP): Performed by: PHYSICIAN ASSISTANT

## 2024-01-29 PROCEDURE — 6360000002 HC RX W HCPCS: Performed by: ANESTHESIOLOGY

## 2024-01-29 PROCEDURE — A4217 STERILE WATER/SALINE, 500 ML: HCPCS | Performed by: NEUROLOGICAL SURGERY

## 2024-01-29 PROCEDURE — 2580000003 HC RX 258: Performed by: NURSE ANESTHETIST, CERTIFIED REGISTERED

## 2024-01-29 PROCEDURE — 01NB0ZZ RELEASE LUMBAR NERVE, OPEN APPROACH: ICD-10-PCS | Performed by: NEUROLOGICAL SURGERY

## 2024-01-29 PROCEDURE — 72100 X-RAY EXAM L-S SPINE 2/3 VWS: CPT

## 2024-01-29 PROCEDURE — 0SB40ZZ EXCISION OF LUMBOSACRAL DISC, OPEN APPROACH: ICD-10-PCS | Performed by: NEUROLOGICAL SURGERY

## 2024-01-29 PROCEDURE — 2580000003 HC RX 258: Performed by: PHYSICIAN ASSISTANT

## 2024-01-29 PROCEDURE — 8E0WXBG COMPUTER ASSISTED PROCEDURE OF TRUNK REGION, WITH COMPUTERIZED TOMOGRAPHY: ICD-10-PCS | Performed by: NEUROLOGICAL SURGERY

## 2024-01-29 PROCEDURE — 2720000010 HC SURG SUPPLY STERILE: Performed by: NEUROLOGICAL SURGERY

## 2024-01-29 PROCEDURE — 86850 RBC ANTIBODY SCREEN: CPT

## 2024-01-29 PROCEDURE — 86900 BLOOD TYPING SEROLOGIC ABO: CPT

## 2024-01-29 PROCEDURE — 0SB20ZZ EXCISION OF LUMBAR VERTEBRAL DISC, OPEN APPROACH: ICD-10-PCS | Performed by: NEUROLOGICAL SURGERY

## 2024-01-29 PROCEDURE — 0SG30A0 FUSION OF LUMBOSACRAL JOINT WITH INTERBODY FUSION DEVICE, ANTERIOR APPROACH, ANTERIOR COLUMN, OPEN APPROACH: ICD-10-PCS | Performed by: NEUROLOGICAL SURGERY

## 2024-01-29 PROCEDURE — 86901 BLOOD TYPING SEROLOGIC RH(D): CPT

## 2024-01-29 PROCEDURE — 6360000002 HC RX W HCPCS: Performed by: NURSE ANESTHETIST, CERTIFIED REGISTERED

## 2024-01-29 PROCEDURE — 2500000003 HC RX 250 WO HCPCS: Performed by: NEUROLOGICAL SURGERY

## 2024-01-29 PROCEDURE — 3600000004 HC SURGERY LEVEL 4 BASE: Performed by: NEUROLOGICAL SURGERY

## 2024-01-29 PROCEDURE — 2060000000 HC ICU INTERMEDIATE R&B

## 2024-01-29 PROCEDURE — 7100000000 HC PACU RECOVERY - FIRST 15 MIN: Performed by: NEUROLOGICAL SURGERY

## 2024-01-29 PROCEDURE — C9359 IMPLNT,BON VOID FILLER-PUTTY: HCPCS | Performed by: NEUROLOGICAL SURGERY

## 2024-01-29 PROCEDURE — 0SG00A0 FUSION OF LUMBAR VERTEBRAL JOINT WITH INTERBODY FUSION DEVICE, ANTERIOR APPROACH, ANTERIOR COLUMN, OPEN APPROACH: ICD-10-PCS | Performed by: NEUROLOGICAL SURGERY

## 2024-01-29 PROCEDURE — 3700000001 HC ADD 15 MINUTES (ANESTHESIA): Performed by: NEUROLOGICAL SURGERY

## 2024-01-29 PROCEDURE — 6360000002 HC RX W HCPCS: Performed by: PHYSICIAN ASSISTANT

## 2024-01-29 PROCEDURE — 3600000014 HC SURGERY LEVEL 4 ADDTL 15MIN: Performed by: NEUROLOGICAL SURGERY

## 2024-01-29 PROCEDURE — C1713 ANCHOR/SCREW BN/BN,TIS/BN: HCPCS | Performed by: NEUROLOGICAL SURGERY

## 2024-01-29 PROCEDURE — 2580000003 HC RX 258: Performed by: NEUROLOGICAL SURGERY

## 2024-01-29 PROCEDURE — C1821 INTERSPINOUS IMPLANT: HCPCS | Performed by: NEUROLOGICAL SURGERY

## 2024-01-29 PROCEDURE — 85730 THROMBOPLASTIN TIME PARTIAL: CPT

## 2024-01-29 PROCEDURE — 3700000000 HC ANESTHESIA ATTENDED CARE: Performed by: NEUROLOGICAL SURGERY

## 2024-01-29 PROCEDURE — 2500000003 HC RX 250 WO HCPCS: Performed by: NURSE ANESTHETIST, CERTIFIED REGISTERED

## 2024-01-29 PROCEDURE — 7100000001 HC PACU RECOVERY - ADDTL 15 MIN: Performed by: NEUROLOGICAL SURGERY

## 2024-01-29 DEVICE — IMPLANTABLE DEVICE: Type: IMPLANTABLE DEVICE | Site: SPINE LUMBAR | Status: FUNCTIONAL

## 2024-01-29 DEVICE — ROD SPNL L70MM DIA5.5MM POST PEDCL TI LORDOSED VIPER 2: Type: IMPLANTABLE DEVICE | Site: SPINE LUMBAR | Status: FUNCTIONAL

## 2024-01-29 DEVICE — SCREW SPNL L40MM OD7MM CORT FIX TI POLYAX FEN EXT TAB MIS: Type: IMPLANTABLE DEVICE | Site: SPINE LUMBAR | Status: FUNCTIONAL

## 2024-01-29 DEVICE — BONE GRFT SUB L 12.5CC BIOACTIVE GLS MTRX: Type: IMPLANTABLE DEVICE | Site: SPINE LUMBAR | Status: FUNCTIONAL

## 2024-01-29 DEVICE — BONE GRAFT KIT 7510200 INFUSE SMALL
Type: IMPLANTABLE DEVICE | Site: SPINE LUMBAR | Status: FUNCTIONAL
Brand: INFUSE® BONE GRAFT

## 2024-01-29 DEVICE — SET SCR SPNL TI SGL INNR FOR VIPER 2 MINIMALLY INVASIVE: Type: IMPLANTABLE DEVICE | Site: SPINE LUMBAR | Status: FUNCTIONAL

## 2024-01-29 DEVICE — SCREW SPNL L45MM OD6MM CORT FIX TI POLYAX FEN EXT TAB MIS: Type: IMPLANTABLE DEVICE | Site: SPINE LUMBAR | Status: FUNCTIONAL

## 2024-01-29 DEVICE — SPACER SPNL SM 1.9ML H12X6.7MM 14DEG BLU PEEK FOR ANTR LUM: Type: IMPLANTABLE DEVICE | Site: SPINE LUMBAR | Status: FUNCTIONAL

## 2024-01-29 DEVICE — ROD SPNL L65MM TI LORDOSED FOR MINIMALLY INVASIVE SURG SYS: Type: IMPLANTABLE DEVICE | Site: SPINE LUMBAR | Status: FUNCTIONAL

## 2024-01-29 DEVICE — SCREW SPNL L20MM DIA4MM SCLEROTIC TI ALLY ST LOK FN TIP: Type: IMPLANTABLE DEVICE | Site: SPINE LUMBAR | Status: FUNCTIONAL

## 2024-01-29 RX ORDER — HYDROMORPHONE HYDROCHLORIDE 2 MG/1
1 TABLET ORAL EVERY 4 HOURS PRN
Status: DISCONTINUED | OUTPATIENT
Start: 2024-01-29 | End: 2024-01-30

## 2024-01-29 RX ORDER — METHOCARBAMOL 750 MG/1
750 TABLET, FILM COATED ORAL EVERY 8 HOURS PRN
Status: DISCONTINUED | OUTPATIENT
Start: 2024-01-29 | End: 2024-01-30

## 2024-01-29 RX ORDER — SODIUM CHLORIDE 9 MG/ML
INJECTION, SOLUTION INTRAVENOUS PRN
Status: DISCONTINUED | OUTPATIENT
Start: 2024-01-29 | End: 2024-01-31 | Stop reason: HOSPADM

## 2024-01-29 RX ORDER — SODIUM CHLORIDE, SODIUM LACTATE, POTASSIUM CHLORIDE, CALCIUM CHLORIDE 600; 310; 30; 20 MG/100ML; MG/100ML; MG/100ML; MG/100ML
INJECTION, SOLUTION INTRAVENOUS CONTINUOUS
Status: DISCONTINUED | OUTPATIENT
Start: 2024-01-29 | End: 2024-01-29 | Stop reason: HOSPADM

## 2024-01-29 RX ORDER — MEPERIDINE HYDROCHLORIDE 25 MG/ML
12.5 INJECTION INTRAMUSCULAR; INTRAVENOUS; SUBCUTANEOUS EVERY 5 MIN PRN
Status: DISCONTINUED | OUTPATIENT
Start: 2024-01-29 | End: 2024-01-29 | Stop reason: HOSPADM

## 2024-01-29 RX ORDER — HYDROMORPHONE HYDROCHLORIDE 1 MG/ML
0.5 INJECTION, SOLUTION INTRAMUSCULAR; INTRAVENOUS; SUBCUTANEOUS EVERY 10 MIN PRN
Status: DISCONTINUED | OUTPATIENT
Start: 2024-01-29 | End: 2024-01-29 | Stop reason: HOSPADM

## 2024-01-29 RX ORDER — DIAZEPAM 5 MG/1
5 TABLET ORAL EVERY 6 HOURS PRN
Status: DISCONTINUED | OUTPATIENT
Start: 2024-01-29 | End: 2024-01-31

## 2024-01-29 RX ORDER — ACETAMINOPHEN 325 MG/1
650 TABLET ORAL EVERY 6 HOURS
Status: DISCONTINUED | OUTPATIENT
Start: 2024-01-29 | End: 2024-01-29

## 2024-01-29 RX ORDER — SODIUM CHLORIDE 0.9 % (FLUSH) 0.9 %
5-40 SYRINGE (ML) INJECTION PRN
Status: DISCONTINUED | OUTPATIENT
Start: 2024-01-29 | End: 2024-01-29 | Stop reason: HOSPADM

## 2024-01-29 RX ORDER — MIDAZOLAM HYDROCHLORIDE 1 MG/ML
INJECTION INTRAMUSCULAR; INTRAVENOUS PRN
Status: DISCONTINUED | OUTPATIENT
Start: 2024-01-29 | End: 2024-01-29 | Stop reason: SDUPTHER

## 2024-01-29 RX ORDER — PROPOFOL 10 MG/ML
INJECTION, EMULSION INTRAVENOUS CONTINUOUS PRN
Status: DISCONTINUED | OUTPATIENT
Start: 2024-01-29 | End: 2024-01-29 | Stop reason: SDUPTHER

## 2024-01-29 RX ORDER — DIPHENHYDRAMINE HYDROCHLORIDE 50 MG/ML
12.5 INJECTION INTRAMUSCULAR; INTRAVENOUS
Status: COMPLETED | OUTPATIENT
Start: 2024-01-29 | End: 2024-01-29

## 2024-01-29 RX ORDER — HYDROMORPHONE HYDROCHLORIDE 1 MG/ML
0.25 INJECTION, SOLUTION INTRAMUSCULAR; INTRAVENOUS; SUBCUTANEOUS
Status: DISCONTINUED | OUTPATIENT
Start: 2024-01-29 | End: 2024-01-31 | Stop reason: HOSPADM

## 2024-01-29 RX ORDER — ONDANSETRON 2 MG/ML
INJECTION INTRAMUSCULAR; INTRAVENOUS PRN
Status: DISCONTINUED | OUTPATIENT
Start: 2024-01-29 | End: 2024-01-29 | Stop reason: SDUPTHER

## 2024-01-29 RX ORDER — ENOXAPARIN SODIUM 100 MG/ML
40 INJECTION SUBCUTANEOUS DAILY
Status: DISCONTINUED | OUTPATIENT
Start: 2024-01-30 | End: 2024-01-31 | Stop reason: HOSPADM

## 2024-01-29 RX ORDER — ACETAMINOPHEN 325 MG/1
650 TABLET ORAL EVERY 6 HOURS
Status: DISCONTINUED | OUTPATIENT
Start: 2024-01-30 | End: 2024-01-31 | Stop reason: HOSPADM

## 2024-01-29 RX ORDER — DEXTROAMPHETAMINE SACCHARATE, AMPHETAMINE ASPARTATE, DEXTROAMPHETAMINE SULFATE AND AMPHETAMINE SULFATE 1.25; 1.25; 1.25; 1.25 MG/1; MG/1; MG/1; MG/1
15 TABLET ORAL DAILY
Status: DISCONTINUED | OUTPATIENT
Start: 2024-01-30 | End: 2024-01-31 | Stop reason: HOSPADM

## 2024-01-29 RX ORDER — HYDROMORPHONE HYDROCHLORIDE 2 MG/1
2 TABLET ORAL EVERY 4 HOURS PRN
Status: DISCONTINUED | OUTPATIENT
Start: 2024-01-29 | End: 2024-01-30

## 2024-01-29 RX ORDER — PROPOFOL 10 MG/ML
INJECTION, EMULSION INTRAVENOUS PRN
Status: DISCONTINUED | OUTPATIENT
Start: 2024-01-29 | End: 2024-01-29 | Stop reason: SDUPTHER

## 2024-01-29 RX ORDER — SODIUM CHLORIDE 0.9 % (FLUSH) 0.9 %
5-40 SYRINGE (ML) INJECTION PRN
Status: DISCONTINUED | OUTPATIENT
Start: 2024-01-29 | End: 2024-01-31 | Stop reason: HOSPADM

## 2024-01-29 RX ORDER — HYDROMORPHONE HYDROCHLORIDE 1 MG/ML
0.5 INJECTION, SOLUTION INTRAMUSCULAR; INTRAVENOUS; SUBCUTANEOUS EVERY 5 MIN PRN
Status: DISCONTINUED | OUTPATIENT
Start: 2024-01-29 | End: 2024-01-29 | Stop reason: HOSPADM

## 2024-01-29 RX ORDER — FENTANYL CITRATE 50 UG/ML
INJECTION, SOLUTION INTRAMUSCULAR; INTRAVENOUS PRN
Status: DISCONTINUED | OUTPATIENT
Start: 2024-01-29 | End: 2024-01-29 | Stop reason: SDUPTHER

## 2024-01-29 RX ORDER — ROCURONIUM BROMIDE 10 MG/ML
INJECTION, SOLUTION INTRAVENOUS PRN
Status: DISCONTINUED | OUTPATIENT
Start: 2024-01-29 | End: 2024-01-29 | Stop reason: SDUPTHER

## 2024-01-29 RX ORDER — LABETALOL HYDROCHLORIDE 5 MG/ML
10 INJECTION, SOLUTION INTRAVENOUS
Status: DISCONTINUED | OUTPATIENT
Start: 2024-01-29 | End: 2024-01-29 | Stop reason: HOSPADM

## 2024-01-29 RX ORDER — ONDANSETRON 4 MG/1
4 TABLET, ORALLY DISINTEGRATING ORAL EVERY 8 HOURS PRN
Status: DISCONTINUED | OUTPATIENT
Start: 2024-01-29 | End: 2024-01-31 | Stop reason: HOSPADM

## 2024-01-29 RX ORDER — SUCCINYLCHOLINE/SOD CL,ISO/PF 200MG/10ML
SYRINGE (ML) INTRAVENOUS PRN
Status: DISCONTINUED | OUTPATIENT
Start: 2024-01-29 | End: 2024-01-29 | Stop reason: SDUPTHER

## 2024-01-29 RX ORDER — GLYCOPYRROLATE 0.2 MG/ML
INJECTION INTRAMUSCULAR; INTRAVENOUS PRN
Status: DISCONTINUED | OUTPATIENT
Start: 2024-01-29 | End: 2024-01-29 | Stop reason: SDUPTHER

## 2024-01-29 RX ORDER — SODIUM CHLORIDE 9 MG/ML
INJECTION, SOLUTION INTRAVENOUS PRN
Status: DISCONTINUED | OUTPATIENT
Start: 2024-01-29 | End: 2024-01-29 | Stop reason: HOSPADM

## 2024-01-29 RX ORDER — SODIUM CHLORIDE 9 MG/ML
INJECTION, SOLUTION INTRAVENOUS CONTINUOUS
Status: DISCONTINUED | OUTPATIENT
Start: 2024-01-29 | End: 2024-01-30

## 2024-01-29 RX ORDER — SODIUM CHLORIDE 0.9 % (FLUSH) 0.9 %
5-40 SYRINGE (ML) INJECTION EVERY 12 HOURS SCHEDULED
Status: DISCONTINUED | OUTPATIENT
Start: 2024-01-29 | End: 2024-01-29 | Stop reason: HOSPADM

## 2024-01-29 RX ORDER — HYDRALAZINE HYDROCHLORIDE 20 MG/ML
10 INJECTION INTRAMUSCULAR; INTRAVENOUS
Status: DISCONTINUED | OUTPATIENT
Start: 2024-01-29 | End: 2024-01-29 | Stop reason: HOSPADM

## 2024-01-29 RX ORDER — METOCLOPRAMIDE HYDROCHLORIDE 5 MG/ML
10 INJECTION INTRAMUSCULAR; INTRAVENOUS
Status: DISCONTINUED | OUTPATIENT
Start: 2024-01-29 | End: 2024-01-29 | Stop reason: HOSPADM

## 2024-01-29 RX ORDER — SODIUM CHLORIDE, SODIUM LACTATE, POTASSIUM CHLORIDE, CALCIUM CHLORIDE 600; 310; 30; 20 MG/100ML; MG/100ML; MG/100ML; MG/100ML
INJECTION, SOLUTION INTRAVENOUS CONTINUOUS PRN
Status: DISCONTINUED | OUTPATIENT
Start: 2024-01-29 | End: 2024-01-29 | Stop reason: SDUPTHER

## 2024-01-29 RX ORDER — ONDANSETRON 2 MG/ML
4 INJECTION INTRAMUSCULAR; INTRAVENOUS EVERY 6 HOURS PRN
Status: DISCONTINUED | OUTPATIENT
Start: 2024-01-29 | End: 2024-01-31 | Stop reason: HOSPADM

## 2024-01-29 RX ORDER — SODIUM CHLORIDE 0.9 % (FLUSH) 0.9 %
5-40 SYRINGE (ML) INJECTION EVERY 12 HOURS SCHEDULED
Status: DISCONTINUED | OUTPATIENT
Start: 2024-01-29 | End: 2024-01-31 | Stop reason: HOSPADM

## 2024-01-29 RX ORDER — MAGNESIUM HYDROXIDE 1200 MG/15ML
LIQUID ORAL CONTINUOUS PRN
Status: DISCONTINUED | OUTPATIENT
Start: 2024-01-29 | End: 2024-01-29 | Stop reason: HOSPADM

## 2024-01-29 RX ORDER — LIDOCAINE HYDROCHLORIDE 10 MG/ML
1 INJECTION, SOLUTION EPIDURAL; INFILTRATION; INTRACAUDAL; PERINEURAL
Status: DISCONTINUED | OUTPATIENT
Start: 2024-01-29 | End: 2024-01-29 | Stop reason: HOSPADM

## 2024-01-29 RX ORDER — HYDROMORPHONE HYDROCHLORIDE 1 MG/ML
0.5 INJECTION, SOLUTION INTRAMUSCULAR; INTRAVENOUS; SUBCUTANEOUS
Status: DISCONTINUED | OUTPATIENT
Start: 2024-01-29 | End: 2024-01-31 | Stop reason: HOSPADM

## 2024-01-29 RX ORDER — HYDROMORPHONE HYDROCHLORIDE 2 MG/ML
INJECTION, SOLUTION INTRAMUSCULAR; INTRAVENOUS; SUBCUTANEOUS PRN
Status: DISCONTINUED | OUTPATIENT
Start: 2024-01-29 | End: 2024-01-29 | Stop reason: SDUPTHER

## 2024-01-29 RX ORDER — LIDOCAINE HYDROCHLORIDE 20 MG/ML
INJECTION, SOLUTION INTRAVENOUS PRN
Status: DISCONTINUED | OUTPATIENT
Start: 2024-01-29 | End: 2024-01-29 | Stop reason: SDUPTHER

## 2024-01-29 RX ORDER — DEXAMETHASONE SODIUM PHOSPHATE 4 MG/ML
INJECTION, SOLUTION INTRA-ARTICULAR; INTRALESIONAL; INTRAMUSCULAR; INTRAVENOUS; SOFT TISSUE PRN
Status: DISCONTINUED | OUTPATIENT
Start: 2024-01-29 | End: 2024-01-29 | Stop reason: SDUPTHER

## 2024-01-29 RX ADMIN — ACETAMINOPHEN 650 MG: 325 TABLET ORAL at 21:50

## 2024-01-29 RX ADMIN — SODIUM CHLORIDE 2000 MG: 900 INJECTION INTRAVENOUS at 21:56

## 2024-01-29 RX ADMIN — MIDAZOLAM HYDROCHLORIDE 2 MG: 2 INJECTION, SOLUTION INTRAMUSCULAR; INTRAVENOUS at 14:51

## 2024-01-29 RX ADMIN — HYDROMORPHONE HYDROCHLORIDE 0.5 MG: 1 INJECTION, SOLUTION INTRAMUSCULAR; INTRAVENOUS; SUBCUTANEOUS at 18:37

## 2024-01-29 RX ADMIN — HYDROMORPHONE HYDROCHLORIDE 0.5 MG: 1 INJECTION, SOLUTION INTRAMUSCULAR; INTRAVENOUS; SUBCUTANEOUS at 19:16

## 2024-01-29 RX ADMIN — SODIUM CHLORIDE: 9 INJECTION, SOLUTION INTRAVENOUS at 21:53

## 2024-01-29 RX ADMIN — HYDROMORPHONE HYDROCHLORIDE 1 MG: 2 INJECTION, SOLUTION INTRAMUSCULAR; INTRAVENOUS; SUBCUTANEOUS at 15:31

## 2024-01-29 RX ADMIN — HYDROMORPHONE HYDROCHLORIDE 0.5 MG: 1 INJECTION, SOLUTION INTRAMUSCULAR; INTRAVENOUS; SUBCUTANEOUS at 21:48

## 2024-01-29 RX ADMIN — ROCURONIUM BROMIDE 90 MG: 10 INJECTION, SOLUTION INTRAVENOUS at 15:15

## 2024-01-29 RX ADMIN — SODIUM CHLORIDE, SODIUM LACTATE, POTASSIUM CHLORIDE, AND CALCIUM CHLORIDE: .6; .31; .03; .02 INJECTION, SOLUTION INTRAVENOUS at 14:51

## 2024-01-29 RX ADMIN — SODIUM CHLORIDE, POTASSIUM CHLORIDE, SODIUM LACTATE AND CALCIUM CHLORIDE: 600; 310; 30; 20 INJECTION, SOLUTION INTRAVENOUS at 11:51

## 2024-01-29 RX ADMIN — FENTANYL CITRATE 50 MCG: 50 INJECTION, SOLUTION INTRAMUSCULAR; INTRAVENOUS at 15:01

## 2024-01-29 RX ADMIN — Medication 120 MG: at 15:02

## 2024-01-29 RX ADMIN — HYDROMORPHONE HYDROCHLORIDE 0.5 MG: 1 INJECTION, SOLUTION INTRAMUSCULAR; INTRAVENOUS; SUBCUTANEOUS at 18:29

## 2024-01-29 RX ADMIN — ROCURONIUM BROMIDE 10 MG: 10 INJECTION, SOLUTION INTRAVENOUS at 15:01

## 2024-01-29 RX ADMIN — SUGAMMADEX 200 MG: 100 INJECTION, SOLUTION INTRAVENOUS at 17:06

## 2024-01-29 RX ADMIN — SODIUM CHLORIDE, POTASSIUM CHLORIDE, SODIUM LACTATE AND CALCIUM CHLORIDE: 600; 310; 30; 20 INJECTION, SOLUTION INTRAVENOUS at 18:18

## 2024-01-29 RX ADMIN — METHOCARBAMOL 1000 MG: 100 INJECTION, SOLUTION INTRAMUSCULAR; INTRAVENOUS at 18:08

## 2024-01-29 RX ADMIN — LIDOCAINE HYDROCHLORIDE 50 MG: 20 INJECTION, SOLUTION INTRAVENOUS at 15:01

## 2024-01-29 RX ADMIN — DEXAMETHASONE SODIUM PHOSPHATE 4 MG: 4 INJECTION, SOLUTION INTRAMUSCULAR; INTRAVENOUS at 15:07

## 2024-01-29 RX ADMIN — FENTANYL CITRATE 50 MCG: 50 INJECTION, SOLUTION INTRAMUSCULAR; INTRAVENOUS at 14:55

## 2024-01-29 RX ADMIN — ONDANSETRON 4 MG: 2 INJECTION INTRAMUSCULAR; INTRAVENOUS at 15:07

## 2024-01-29 RX ADMIN — HYDROMORPHONE HYDROCHLORIDE 1 MG: 2 TABLET ORAL at 19:22

## 2024-01-29 RX ADMIN — REMIFENTANIL HYDROCHLORIDE 0.17 MCG/KG/MIN: 1 INJECTION, POWDER, LYOPHILIZED, FOR SOLUTION INTRAVENOUS at 15:01

## 2024-01-29 RX ADMIN — DIPHENHYDRAMINE HYDROCHLORIDE 12.5 MG: 50 INJECTION INTRAMUSCULAR; INTRAVENOUS at 19:57

## 2024-01-29 RX ADMIN — HYDROMORPHONE HYDROCHLORIDE 0.5 MG: 2 INJECTION, SOLUTION INTRAMUSCULAR; INTRAVENOUS; SUBCUTANEOUS at 15:04

## 2024-01-29 RX ADMIN — HYDROMORPHONE HYDROCHLORIDE 0.5 MG: 2 INJECTION, SOLUTION INTRAMUSCULAR; INTRAVENOUS; SUBCUTANEOUS at 14:56

## 2024-01-29 RX ADMIN — PROPOFOL 150 MCG/KG/MIN: 10 INJECTION, EMULSION INTRAVENOUS at 15:01

## 2024-01-29 RX ADMIN — GLYCOPYRROLATE 0.2 MG: 0.2 INJECTION INTRAMUSCULAR; INTRAVENOUS at 17:01

## 2024-01-29 RX ADMIN — PROPOFOL 200 MG: 10 INJECTION, EMULSION INTRAVENOUS at 15:01

## 2024-01-29 RX ADMIN — HYDROMORPHONE HYDROCHLORIDE 0.5 MG: 1 INJECTION, SOLUTION INTRAMUSCULAR; INTRAVENOUS; SUBCUTANEOUS at 18:57

## 2024-01-29 RX ADMIN — SODIUM CHLORIDE 2000 MG: 900 INJECTION INTRAVENOUS at 15:07

## 2024-01-29 ASSESSMENT — PAIN DESCRIPTION - DESCRIPTORS
DESCRIPTORS: SHARP
DESCRIPTORS: ACHING
DESCRIPTORS: SHARP

## 2024-01-29 ASSESSMENT — PAIN DESCRIPTION - LOCATION
LOCATION: BACK

## 2024-01-29 ASSESSMENT — PAIN - FUNCTIONAL ASSESSMENT
PAIN_FUNCTIONAL_ASSESSMENT: ACTIVITIES ARE NOT PREVENTED
PAIN_FUNCTIONAL_ASSESSMENT: ACTIVITIES ARE NOT PREVENTED
PAIN_FUNCTIONAL_ASSESSMENT: 0-10
PAIN_FUNCTIONAL_ASSESSMENT: ACTIVITIES ARE NOT PREVENTED

## 2024-01-29 ASSESSMENT — PAIN DESCRIPTION - ORIENTATION
ORIENTATION: LOWER
ORIENTATION: MID
ORIENTATION: LOWER

## 2024-01-29 ASSESSMENT — PAIN SCALES - GENERAL
PAINLEVEL_OUTOF10: 4
PAINLEVEL_OUTOF10: 5
PAINLEVEL_OUTOF10: 9
PAINLEVEL_OUTOF10: 5
PAINLEVEL_OUTOF10: 3
PAINLEVEL_OUTOF10: 2
PAINLEVEL_OUTOF10: 8
PAINLEVEL_OUTOF10: 4
PAINLEVEL_OUTOF10: 3

## 2024-01-29 ASSESSMENT — PAIN DESCRIPTION - FREQUENCY
FREQUENCY: CONTINUOUS
FREQUENCY: INTERMITTENT
FREQUENCY: CONTINUOUS
FREQUENCY: CONTINUOUS
FREQUENCY: INTERMITTENT
FREQUENCY: CONTINUOUS

## 2024-01-29 ASSESSMENT — PAIN DESCRIPTION - PAIN TYPE
TYPE: SURGICAL PAIN

## 2024-01-29 ASSESSMENT — PAIN DESCRIPTION - ONSET: ONSET: ON-GOING

## 2024-01-29 NOTE — OP NOTE
Operative Note      Patient: Nellie Richardson  YOB: 1990  MRN: [id number]    Date of Procedure: 1/29/2024    Pre-Op Diagnosis Codes:     * Spondylolisthesis of lumbar region [M43.16]    Post-Op Diagnosis: Same       Procedure(s):  LUMBAR 4-SACRAL 1 ANTERIOR LUMBAR INTERBODY FUSION WITH PEDICLE SCREW FIXATION  .    Surgeon(s):  Fernando Sal MD Kilaru, Sasidhar P, MD    Assistant:   * No surgical staff found *    Anesthesia: General    Estimated Blood Loss (mL): less than 50     Complications: None    Specimens:   * No specimens in log *    Implants:  * No implants in log *      Drains: * No LDAs found *    Detailed Description of Procedure:    The patient is a 33-year-old female with  longstanding history of chronic back and leg pain.  She was evaluated by  Dr. Sal and was felt to require anterior fusion of the L4-L5 and  L5-S1 disk spaces.  I met the patient preoperatively and had an  extensive discussion with her regarding the risks related to exposure.  Risks discussed included bleeding, infection, the possibility of bowel,  bladder, or ureteral injuries; possibility of nerve damage,   paresthesias; hernias or lymph leaks; the possibility of arterial or  venous thrombosis requiring thrombectomy or bypass, and the possibility  of retroperitoneal fluid collection requiring drainage were all  extensively discussed.  The patient understood these risks and wished to  proceed.      PROCEDURE IN DETAIL:  The patient was taken to the operating room,  placed on the operating table in supine position.  General endotracheal  anesthesia was induced.  IV antibiotics were administered and Bartholomew  catheter was placed.  Preoperative localization of disk space was  carried out with fluoroscopy.  The abdomen was then prepped and draped  in the usual sterile fashion.  A midline incision was made extending  from the umbilicus to just above the pubic symphysis.  I dissected  through the subcutaneous 
annulus. The discectomy was carried out in standard fashion using the periosteal elevator, the various shaped curettes, removing the disc down to the posterior longitudinal ligament.  The various distractors were placed and I eventually chose a  12 mm, 10 degree lordosed cage with a small footprint. The cage was packed with Infuse/Fibergraft  mixed with autologous BMA. The cage was advanced into the disc space in good position re-establishing good lordosis. The 24 mm SynFix cancellous screw was advanced into L5 with a washer to prevent graft migration. AP and lateral x-rays showed satisfactory placement of the device and screws.      20cc Exparel was mixed with 20cc 1/2% Marcaine and was infiltrated into the anterior abdominal wall muscle and subcutaneous tissue. Dr. Rucker then performed a multi-layer closure of the abdomen, this will be described in a separate operative note.     She was then placed prone on a Asher table using the O-Arm system. All bony prominences were inspected and padded prior to sterile draping. The lumbosacral area was then prepped and draped in the usual sterile fashion. A small stab incision was then made over the PSIS, the stay pin was affixed to the bone and the stereotactic array was secured. The wound was covered with sterile draps and the O-Arm intraoperative CT was brought into the field to acquire imaging. The images were loaded into the Free[employer]Alerts guidance system and used for pedicle screw placement.      Using a #15 blade knife, the skin was incised 3.5 cm off the midline and centered at the L4-S1 interspaces in a mirror image fashion bilaterally. I used the navigable Midas to cannulate the pedicle of L4 on the right, a Viper pedicle screw was then placed in the pedicle. This was all performed under real image guidance. The procedure was repeated at the L5 and S1 pedicle on the right, and the L4-S1 pedicles on the left. Excellent purchase was obtained.     On the left side of the

## 2024-01-29 NOTE — H&P
I saw and examined Nellie Richardson on 1/29/2024There has been no change to her history or physical in the interval time since consent. She will proceed with the planned procedure.     Jillian Harris PA-C

## 2024-01-29 NOTE — ANESTHESIA PRE PROCEDURE
lb) 64.5 kg (142 lb 3.2 oz)   Height: 1.53 m (5' 0.25\") 1.53 m (5' 0.25\")                                              BP Readings from Last 3 Encounters:   01/29/24 115/65   04/18/23 109/70   03/23/23 (!) 155/83       NPO Status: Time of last liquid consumption: 1230                        Time of last solid consumption: 1800                        Date of last liquid consumption: 01/29/24                        Date of last solid food consumption: 01/28/24    BMI:   Wt Readings from Last 3 Encounters:   01/29/24 64.5 kg (142 lb 3.2 oz)   07/24/23 70.8 kg (156 lb)   07/19/23 70.8 kg (156 lb)     Body mass index is 27.54 kg/m².    CBC:   Lab Results   Component Value Date/Time    WBC 5.2 03/23/2023 07:28 AM    RBC 5.06 03/23/2023 07:28 AM    HGB 14.4 03/23/2023 07:28 AM    HCT 42.8 03/23/2023 07:28 AM    MCV 84.7 03/23/2023 07:28 AM    RDW 12.7 03/23/2023 07:28 AM     03/23/2023 07:28 AM       CMP:   Lab Results   Component Value Date/Time     12/03/2013 07:46 PM    K 3.7 12/03/2013 07:46 PM     12/03/2013 07:46 PM    CO2 29 12/03/2013 07:46 PM    BUN 14 12/03/2013 07:46 PM    CREATININE 0.8 12/03/2013 07:46 PM    GFRAA >60 12/03/2013 07:46 PM    AGRATIO 1.7 12/03/2013 07:46 PM    LABGLOM >60 12/03/2013 07:46 PM    GLUCOSE 86 12/03/2013 07:46 PM    PROT 7.5 12/03/2013 07:46 PM    CALCIUM 9.6 12/03/2013 07:46 PM    BILITOT 0.54 12/03/2013 07:46 PM    ALKPHOS 87 12/03/2013 07:46 PM    AST 15 12/03/2013 07:46 PM    ALT 10 12/03/2013 07:46 PM       POC Tests: No results for input(s): \"POCGLU\", \"POCNA\", \"POCK\", \"POCCL\", \"POCBUN\", \"POCHEMO\", \"POCHCT\" in the last 72 hours.    Coags:   Lab Results   Component Value Date/Time    APTT 27.7 01/29/2024 11:50 AM       HCG (If Applicable):   Lab Results   Component Value Date    PREGTESTUR Negative 01/05/2023        ABGs: No results found for: \"PHART\", \"PO2ART\", \"NUT4NAZ\", \"JSY0AOV\", \"BEART\", \"P6IHGKHH\"     Type & Screen (If Applicable):  Lab Results

## 2024-01-30 PROBLEM — Z98.1 S/P LUMBAR AND LUMBOSACRAL FUSION BY ANTERIOR TECHNIQUE: Status: ACTIVE | Noted: 2024-01-30

## 2024-01-30 LAB
ALBUMIN SERPL-MCNC: 3.8 G/DL (ref 3.4–5)
ANION GAP SERPL CALCULATED.3IONS-SCNC: 11 MMOL/L (ref 3–16)
BUN SERPL-MCNC: 11 MG/DL (ref 7–20)
CALCIUM SERPL-MCNC: 8.9 MG/DL (ref 8.3–10.6)
CHLORIDE SERPL-SCNC: 101 MMOL/L (ref 99–110)
CO2 SERPL-SCNC: 22 MMOL/L (ref 21–32)
CREAT SERPL-MCNC: 0.7 MG/DL (ref 0.6–1.1)
DEPRECATED RDW RBC AUTO: 12.8 % (ref 12.4–15.4)
GFR SERPLBLD CREATININE-BSD FMLA CKD-EPI: >60 ML/MIN/{1.73_M2}
GLUCOSE SERPL-MCNC: 118 MG/DL (ref 70–99)
HCT VFR BLD AUTO: 37.3 % (ref 36–48)
HGB BLD-MCNC: 12.3 G/DL (ref 12–16)
MCH RBC QN AUTO: 28.4 PG (ref 26–34)
MCHC RBC AUTO-ENTMCNC: 32.9 G/DL (ref 31–36)
MCV RBC AUTO: 86.4 FL (ref 80–100)
PHOSPHATE SERPL-MCNC: 3.6 MG/DL (ref 2.5–4.9)
PLATELET # BLD AUTO: 170 K/UL (ref 135–450)
PMV BLD AUTO: 8.7 FL (ref 5–10.5)
POTASSIUM SERPL-SCNC: 4.5 MMOL/L (ref 3.5–5.1)
RBC # BLD AUTO: 4.32 M/UL (ref 4–5.2)
SODIUM SERPL-SCNC: 134 MMOL/L (ref 136–145)
WBC # BLD AUTO: 11.5 K/UL (ref 4–11)

## 2024-01-30 PROCEDURE — 97116 GAIT TRAINING THERAPY: CPT

## 2024-01-30 PROCEDURE — 97530 THERAPEUTIC ACTIVITIES: CPT

## 2024-01-30 PROCEDURE — 97535 SELF CARE MNGMENT TRAINING: CPT

## 2024-01-30 PROCEDURE — 6370000000 HC RX 637 (ALT 250 FOR IP): Performed by: NURSE PRACTITIONER

## 2024-01-30 PROCEDURE — 2580000003 HC RX 258: Performed by: PHYSICIAN ASSISTANT

## 2024-01-30 PROCEDURE — 85027 COMPLETE CBC AUTOMATED: CPT

## 2024-01-30 PROCEDURE — 2060000000 HC ICU INTERMEDIATE R&B

## 2024-01-30 PROCEDURE — APPNB30 APP NON BILLABLE TIME 0-30 MINS: Performed by: NURSE PRACTITIONER

## 2024-01-30 PROCEDURE — 80069 RENAL FUNCTION PANEL: CPT

## 2024-01-30 PROCEDURE — 6360000002 HC RX W HCPCS: Performed by: NURSE PRACTITIONER

## 2024-01-30 PROCEDURE — 97161 PT EVAL LOW COMPLEX 20 MIN: CPT

## 2024-01-30 PROCEDURE — 6360000002 HC RX W HCPCS: Performed by: PHYSICIAN ASSISTANT

## 2024-01-30 PROCEDURE — 97166 OT EVAL MOD COMPLEX 45 MIN: CPT

## 2024-01-30 PROCEDURE — 6370000000 HC RX 637 (ALT 250 FOR IP): Performed by: PHYSICIAN ASSISTANT

## 2024-01-30 PROCEDURE — 6370000000 HC RX 637 (ALT 250 FOR IP): Performed by: NEUROLOGICAL SURGERY

## 2024-01-30 PROCEDURE — 2580000003 HC RX 258: Performed by: NURSE PRACTITIONER

## 2024-01-30 PROCEDURE — 99024 POSTOP FOLLOW-UP VISIT: CPT | Performed by: NURSE PRACTITIONER

## 2024-01-30 PROCEDURE — 36415 COLL VENOUS BLD VENIPUNCTURE: CPT

## 2024-01-30 RX ORDER — METOCLOPRAMIDE HYDROCHLORIDE 5 MG/ML
10 INJECTION INTRAMUSCULAR; INTRAVENOUS EVERY 6 HOURS
Status: DISCONTINUED | OUTPATIENT
Start: 2024-01-30 | End: 2024-01-31 | Stop reason: HOSPADM

## 2024-01-30 RX ORDER — POLYETHYLENE GLYCOL 3350 17 G/17G
17 POWDER, FOR SOLUTION ORAL DAILY
Status: DISCONTINUED | OUTPATIENT
Start: 2024-01-30 | End: 2024-01-31 | Stop reason: HOSPADM

## 2024-01-30 RX ORDER — SENNA AND DOCUSATE SODIUM 50; 8.6 MG/1; MG/1
2 TABLET, FILM COATED ORAL 2 TIMES DAILY
Status: DISCONTINUED | OUTPATIENT
Start: 2024-01-30 | End: 2024-01-31 | Stop reason: HOSPADM

## 2024-01-30 RX ORDER — HYDROMORPHONE HYDROCHLORIDE 2 MG/1
2 TABLET ORAL EVERY 4 HOURS PRN
Status: DISCONTINUED | OUTPATIENT
Start: 2024-01-30 | End: 2024-01-31 | Stop reason: HOSPADM

## 2024-01-30 RX ORDER — TAMSULOSIN HYDROCHLORIDE 0.4 MG/1
0.4 CAPSULE ORAL DAILY
Status: DISCONTINUED | OUTPATIENT
Start: 2024-01-30 | End: 2024-01-31 | Stop reason: HOSPADM

## 2024-01-30 RX ORDER — DIAZEPAM 5 MG/1
5 TABLET ORAL EVERY 6 HOURS PRN
Qty: 20 TABLET | Refills: 0 | Status: CANCELLED | OUTPATIENT
Start: 2024-01-30 | End: 2024-02-09

## 2024-01-30 RX ORDER — METHOCARBAMOL 750 MG/1
750 TABLET, FILM COATED ORAL EVERY 6 HOURS SCHEDULED
Status: DISCONTINUED | OUTPATIENT
Start: 2024-01-31 | End: 2024-01-31 | Stop reason: HOSPADM

## 2024-01-30 RX ORDER — HYDROMORPHONE HYDROCHLORIDE 2 MG/1
4 TABLET ORAL EVERY 4 HOURS PRN
Status: DISCONTINUED | OUTPATIENT
Start: 2024-01-30 | End: 2024-01-31 | Stop reason: HOSPADM

## 2024-01-30 RX ORDER — 0.9 % SODIUM CHLORIDE 0.9 %
1000 INTRAVENOUS SOLUTION INTRAVENOUS ONCE
Status: COMPLETED | OUTPATIENT
Start: 2024-01-30 | End: 2024-01-30

## 2024-01-30 RX ADMIN — HYDROMORPHONE HYDROCHLORIDE 0.5 MG: 1 INJECTION, SOLUTION INTRAMUSCULAR; INTRAVENOUS; SUBCUTANEOUS at 01:44

## 2024-01-30 RX ADMIN — SODIUM CHLORIDE, PRESERVATIVE FREE 10 ML: 5 INJECTION INTRAVENOUS at 21:03

## 2024-01-30 RX ADMIN — METOCLOPRAMIDE HYDROCHLORIDE 10 MG: 5 INJECTION INTRAMUSCULAR; INTRAVENOUS at 21:03

## 2024-01-30 RX ADMIN — METHOCARBAMOL 1000 MG: 100 INJECTION, SOLUTION INTRAMUSCULAR; INTRAVENOUS at 09:30

## 2024-01-30 RX ADMIN — SODIUM CHLORIDE 2000 MG: 900 INJECTION INTRAVENOUS at 05:34

## 2024-01-30 RX ADMIN — HYDROMORPHONE HYDROCHLORIDE 2 MG: 2 TABLET ORAL at 00:43

## 2024-01-30 RX ADMIN — SODIUM CHLORIDE 1000 ML: 9 INJECTION, SOLUTION INTRAVENOUS at 13:55

## 2024-01-30 RX ADMIN — DEXTROAMPHETAMINE SACCHARATE, AMPHETAMINE ASPARTATE, DEXTROAMPHETAMINE SULFATE AND AMPHETAMINE SULFATE 15 MG: 1.25; 1.25; 1.25; 1.25 TABLET ORAL at 09:42

## 2024-01-30 RX ADMIN — DOCUSATE SODIUM 50 MG AND SENNOSIDES 8.6 MG 2 TABLET: 8.6; 5 TABLET, FILM COATED ORAL at 21:03

## 2024-01-30 RX ADMIN — METHOCARBAMOL 750 MG: 750 TABLET ORAL at 03:41

## 2024-01-30 RX ADMIN — SODIUM CHLORIDE, PRESERVATIVE FREE 10 ML: 5 INJECTION INTRAVENOUS at 09:32

## 2024-01-30 RX ADMIN — TAMSULOSIN HYDROCHLORIDE 0.4 MG: 0.4 CAPSULE ORAL at 09:30

## 2024-01-30 RX ADMIN — DOCUSATE SODIUM 50 MG AND SENNOSIDES 8.6 MG 2 TABLET: 8.6; 5 TABLET, FILM COATED ORAL at 09:30

## 2024-01-30 RX ADMIN — HYDROMORPHONE HYDROCHLORIDE 2 MG: 2 TABLET ORAL at 11:53

## 2024-01-30 RX ADMIN — POLYETHYLENE GLYCOL 3350 17 G: 17 POWDER, FOR SOLUTION ORAL at 09:30

## 2024-01-30 RX ADMIN — ACETAMINOPHEN 650 MG: 325 TABLET ORAL at 21:03

## 2024-01-30 RX ADMIN — SODIUM CHLORIDE 2000 MG: 900 INJECTION INTRAVENOUS at 16:39

## 2024-01-30 RX ADMIN — ACETAMINOPHEN 650 MG: 325 TABLET ORAL at 03:41

## 2024-01-30 RX ADMIN — ACETAMINOPHEN 650 MG: 325 TABLET ORAL at 09:31

## 2024-01-30 RX ADMIN — HYDROMORPHONE HYDROCHLORIDE 2 MG: 2 TABLET ORAL at 22:57

## 2024-01-30 RX ADMIN — HYDROMORPHONE HYDROCHLORIDE 2 MG: 2 TABLET ORAL at 16:42

## 2024-01-30 RX ADMIN — METOCLOPRAMIDE HYDROCHLORIDE 10 MG: 5 INJECTION INTRAMUSCULAR; INTRAVENOUS at 09:30

## 2024-01-30 RX ADMIN — ACETAMINOPHEN 650 MG: 325 TABLET ORAL at 16:46

## 2024-01-30 RX ADMIN — METOCLOPRAMIDE HYDROCHLORIDE 10 MG: 5 INJECTION INTRAMUSCULAR; INTRAVENOUS at 16:39

## 2024-01-30 RX ADMIN — METHOCARBAMOL 1000 MG: 100 INJECTION, SOLUTION INTRAMUSCULAR; INTRAVENOUS at 17:53

## 2024-01-30 RX ADMIN — HYDROMORPHONE HYDROCHLORIDE 1 MG: 2 TABLET ORAL at 05:46

## 2024-01-30 RX ADMIN — ENOXAPARIN SODIUM 40 MG: 100 INJECTION SUBCUTANEOUS at 09:30

## 2024-01-30 ASSESSMENT — PAIN SCALES - GENERAL
PAINLEVEL_OUTOF10: 6
PAINLEVEL_OUTOF10: 4
PAINLEVEL_OUTOF10: 8
PAINLEVEL_OUTOF10: 6
PAINLEVEL_OUTOF10: 4
PAINLEVEL_OUTOF10: 4
PAINLEVEL_OUTOF10: 8
PAINLEVEL_OUTOF10: 5
PAINLEVEL_OUTOF10: 2
PAINLEVEL_OUTOF10: 2
PAINLEVEL_OUTOF10: 1
PAINLEVEL_OUTOF10: 3
PAINLEVEL_OUTOF10: 8
PAINLEVEL_OUTOF10: 0
PAINLEVEL_OUTOF10: 4
PAINLEVEL_OUTOF10: 6
PAINLEVEL_OUTOF10: 5
PAINLEVEL_OUTOF10: 6
PAINLEVEL_OUTOF10: 0

## 2024-01-30 ASSESSMENT — PAIN DESCRIPTION - PAIN TYPE
TYPE: ACUTE PAIN;SURGICAL PAIN
TYPE: ACUTE PAIN;SURGICAL PAIN
TYPE: SURGICAL PAIN
TYPE: ACUTE PAIN;SURGICAL PAIN
TYPE: SURGICAL PAIN

## 2024-01-30 ASSESSMENT — PAIN DESCRIPTION - FREQUENCY
FREQUENCY: CONTINUOUS

## 2024-01-30 ASSESSMENT — PAIN DESCRIPTION - ORIENTATION
ORIENTATION: LOWER
ORIENTATION: LOWER
ORIENTATION: MID
ORIENTATION: MID
ORIENTATION: LOWER
ORIENTATION: LOWER
ORIENTATION: MID
ORIENTATION: MID
ORIENTATION: LOWER

## 2024-01-30 ASSESSMENT — PAIN DESCRIPTION - LOCATION
LOCATION: BACK

## 2024-01-30 ASSESSMENT — PAIN - FUNCTIONAL ASSESSMENT

## 2024-01-30 ASSESSMENT — PAIN DESCRIPTION - DESCRIPTORS
DESCRIPTORS: ACHING
DESCRIPTORS: ACHING;SHARP
DESCRIPTORS: ACHING;SHARP
DESCRIPTORS: ACHING
DESCRIPTORS: SHARP
DESCRIPTORS: ACHING

## 2024-01-30 ASSESSMENT — PAIN DESCRIPTION - ONSET
ONSET: ON-GOING
ONSET: PROGRESSIVE
ONSET: GRADUAL
ONSET: ON-GOING
ONSET: ON-GOING

## 2024-01-30 ASSESSMENT — PAIN DESCRIPTION - DIRECTION
RADIATING_TOWARDS: NO
RADIATING_TOWARDS: NO

## 2024-01-30 NOTE — ANESTHESIA POSTPROCEDURE EVALUATION
Department of Anesthesiology  Postprocedure Note    Patient: Nellie Richardson  MRN: [id number]  YOB: 1990  Date of evaluation: 1/29/2024    Procedure Summary       Date: 01/29/24 Room / Location: [address]    Anesthesia Start: 1451 Anesthesia Stop: 1821    Procedures:       LUMBAR 4-SACRAL 1 ANTERIOR LUMBAR INTERBODY FUSION WITH PEDICLE SCREW FIXATION (Abdomen)      . (Back) Diagnosis:       Spondylolisthesis of lumbar region      (Spondylolisthesis of lumbar region [M43.16])    Surgeons: Fernando Sal MD Responsible Provider: María Chowdary DO    Anesthesia Type: general, TIVA ASA Status: 2            Anesthesia Type: No value filed.    Sebastian Phase I: Sebastian Score: 8    Sebastian Phase II:      Anesthesia Post Evaluation    Patient location during evaluation: PACU  Patient participation: complete - patient participated  Level of consciousness: awake and alert  Pain score: 2  Airway patency: patent  Nausea & Vomiting: no nausea and no vomiting  Cardiovascular status: blood pressure returned to baseline  Respiratory status: acceptable  Hydration status: euvolemic  Multimodal analgesia pain management approach  Pain management: adequate    No notable events documented.

## 2024-01-30 NOTE — CARE COORDINATION
CM spoke with patient at bedside.  She is from home with minor children, her father is staying with her for a period of time to help.  She plans to return home at discharge, states her family has shower chair and walker.  She is normally active , no needs.  Will continue to follow.    Gilma Kaufman RN, BSN,    Ortho/Neuro   [phone]

## 2024-01-30 NOTE — PLAN OF CARE
Problem: Pain  Goal: Verbalizes/displays adequate comfort level or baseline comfort level  Outcome: Progressing  Note: Pt. Managing pain per MAR.     Problem: Safety - Adult  Goal: Free from fall injury  Outcome: Progressing  Note: All fall precautions in place and call light is within reach.

## 2024-01-30 NOTE — CONSULTS
V2.0  [facility] [place] Consult Note      Name:  Nellie Richardson /Age/Sex: 1990  (33 y.o. female)   MRN & CSN:  [id number] & [id number] Encounter Date/Time: 2024 6:39 PM EST   Location:  [facility]- PCP: Tati Tracy, APRN - CNP     Attending:Fernando Sal MD  Consulting Provider: Jefe Castañeda MD      Hospital Day: 2    Assessment and Recommendations   Nellie Richardson is a 33 y.o. female with pmh of allergic rhinitis who presents with Lumbar stenosis with neurogenic claudication    Hospital Problems             Last Modified POA    * (Principal) Lumbar stenosis with neurogenic claudication 2024 Yes    S/P lumbar and lumbosacral fusion by anterior technique 2024 Yes   #Allergic rhinitis  #ADHD    Recommendations:   NSGY primary  Analgesia prn  PT/OT  Continue home meds: Adderall      Diet ADULT DIET; Regular   DVT Prophylaxis [] Lovenox, []  Heparin, [] SCDs, [] Ambulation,  [] Eliquis, [] Xarelto   Code Status Full Code   Surrogate Decision Maker/ POA  Antonietta Richardson, parent     Personally reviewed Lab Studies and Imaging     History From:    History obtained from the patient.     History of Present Illness:      Chief Complaint: Back Pain    Nellie Richardson is a 33 y.o. female who presented for elective L4-S1 ALIF with NSGY due to chronic spondylolisthesis. Hospitalist service consulted for aid in terrence-op medical management.    Seen post-op and feels okay overall. Pain is controlled and no other active symptoms.    Review of Systems:      Pertinent positives and negatives discussed in HPI    Objective:     Intake/Output Summary (Last 24 hours) at 2024 1839  Last data filed at 2024 0600  Gross per 24 hour   Intake 500 ml   Output 0 ml   Net 500 ml      Vitals:   Vitals:    24 1223 24 1245 24 1550 24 1642   BP:  (!) 94/53 (!) 94/57    Pulse:  54 67    Resp: 16 16 17 16   Temp:   97.8 °F (36.6 °C)    TempSrc:   Oral    SpO2:  98% 100% 100%   Weight:

## 2024-01-30 NOTE — DISCHARGE INSTRUCTIONS
[facility] Spine Program Survey  The [facility] values your feedback related to your recent hospital visit and admission. We strive to improve our program to promote better outcomes and recoveries for all our patients.  The survey code below consists of a few questions that are related to your stay and around your Spine diagnosis, treatment, and recovery. The estimated length of time needed to complete this survey is 4 minutes or less. Thank you for completing this survey!

## 2024-01-31 VITALS
HEIGHT: 60 IN | SYSTOLIC BLOOD PRESSURE: 107 MMHG | HEART RATE: 80 BPM | DIASTOLIC BLOOD PRESSURE: 73 MMHG | OXYGEN SATURATION: 100 % | RESPIRATION RATE: 18 BRPM | TEMPERATURE: 98.5 F | BODY MASS INDEX: 27.92 KG/M2 | WEIGHT: 142.2 LBS

## 2024-01-31 PROCEDURE — 6370000000 HC RX 637 (ALT 250 FOR IP): Performed by: PHYSICIAN ASSISTANT

## 2024-01-31 PROCEDURE — 2580000003 HC RX 258: Performed by: NURSE PRACTITIONER

## 2024-01-31 PROCEDURE — 99024 POSTOP FOLLOW-UP VISIT: CPT | Performed by: NURSE PRACTITIONER

## 2024-01-31 PROCEDURE — 2580000003 HC RX 258: Performed by: PHYSICIAN ASSISTANT

## 2024-01-31 PROCEDURE — 6360000002 HC RX W HCPCS: Performed by: PHYSICIAN ASSISTANT

## 2024-01-31 PROCEDURE — APPNB30 APP NON BILLABLE TIME 0-30 MINS: Performed by: NURSE PRACTITIONER

## 2024-01-31 PROCEDURE — 97116 GAIT TRAINING THERAPY: CPT

## 2024-01-31 PROCEDURE — 6370000000 HC RX 637 (ALT 250 FOR IP): Performed by: NURSE PRACTITIONER

## 2024-01-31 PROCEDURE — 6370000000 HC RX 637 (ALT 250 FOR IP): Performed by: NEUROLOGICAL SURGERY

## 2024-01-31 PROCEDURE — 6360000002 HC RX W HCPCS: Performed by: NURSE PRACTITIONER

## 2024-01-31 RX ORDER — TAMSULOSIN HYDROCHLORIDE 0.4 MG/1
0.4 CAPSULE ORAL DAILY
Qty: 10 CAPSULE | Refills: 0 | Status: SHIPPED | OUTPATIENT
Start: 2024-01-31 | End: 2024-02-10

## 2024-01-31 RX ORDER — SENNA AND DOCUSATE SODIUM 50; 8.6 MG/1; MG/1
2 TABLET, FILM COATED ORAL 2 TIMES DAILY
COMMUNITY
Start: 2024-01-31

## 2024-01-31 RX ORDER — METHOCARBAMOL 750 MG/1
750 TABLET, FILM COATED ORAL 4 TIMES DAILY
Qty: 40 TABLET | Refills: 0 | Status: SHIPPED | OUTPATIENT
Start: 2024-01-31 | End: 2024-02-10

## 2024-01-31 RX ORDER — DIAZEPAM 2 MG/1
2 TABLET ORAL EVERY 6 HOURS PRN
Status: DISCONTINUED | OUTPATIENT
Start: 2024-01-31 | End: 2024-01-31 | Stop reason: HOSPADM

## 2024-01-31 RX ORDER — 0.9 % SODIUM CHLORIDE 0.9 %
1000 INTRAVENOUS SOLUTION INTRAVENOUS ONCE
Status: COMPLETED | OUTPATIENT
Start: 2024-01-31 | End: 2024-01-31

## 2024-01-31 RX ORDER — DIAZEPAM 2 MG/1
2 TABLET ORAL EVERY 6 HOURS PRN
Qty: 21 TABLET | Refills: 0 | Status: SHIPPED | OUTPATIENT
Start: 2024-01-31 | End: 2024-02-10

## 2024-01-31 RX ORDER — HYDROMORPHONE HYDROCHLORIDE 2 MG/1
2-4 TABLET ORAL EVERY 4 HOURS PRN
Qty: 42 TABLET | Refills: 0 | Status: SHIPPED | OUTPATIENT
Start: 2024-01-31 | End: 2024-02-07

## 2024-01-31 RX ADMIN — ACETAMINOPHEN 650 MG: 325 TABLET ORAL at 04:08

## 2024-01-31 RX ADMIN — TAMSULOSIN HYDROCHLORIDE 0.4 MG: 0.4 CAPSULE ORAL at 09:34

## 2024-01-31 RX ADMIN — HYDROMORPHONE HYDROCHLORIDE 2 MG: 2 TABLET ORAL at 06:25

## 2024-01-31 RX ADMIN — DEXTROAMPHETAMINE SACCHARATE, AMPHETAMINE ASPARTATE, DEXTROAMPHETAMINE SULFATE AND AMPHETAMINE SULFATE 15 MG: 1.25; 1.25; 1.25; 1.25 TABLET ORAL at 09:49

## 2024-01-31 RX ADMIN — METHOCARBAMOL 750 MG: 750 TABLET ORAL at 12:23

## 2024-01-31 RX ADMIN — POLYETHYLENE GLYCOL 3350 17 G: 17 POWDER, FOR SOLUTION ORAL at 09:34

## 2024-01-31 RX ADMIN — METHOCARBAMOL 1000 MG: 100 INJECTION, SOLUTION INTRAMUSCULAR; INTRAVENOUS at 00:49

## 2024-01-31 RX ADMIN — HYDROMORPHONE HYDROCHLORIDE 2 MG: 2 TABLET ORAL at 12:23

## 2024-01-31 RX ADMIN — SODIUM CHLORIDE 1000 ML: 9 INJECTION, SOLUTION INTRAVENOUS at 04:11

## 2024-01-31 RX ADMIN — SODIUM CHLORIDE, PRESERVATIVE FREE 10 ML: 5 INJECTION INTRAVENOUS at 09:34

## 2024-01-31 RX ADMIN — METOCLOPRAMIDE HYDROCHLORIDE 10 MG: 5 INJECTION INTRAMUSCULAR; INTRAVENOUS at 09:34

## 2024-01-31 RX ADMIN — ENOXAPARIN SODIUM 40 MG: 100 INJECTION SUBCUTANEOUS at 09:34

## 2024-01-31 RX ADMIN — ACETAMINOPHEN 650 MG: 325 TABLET ORAL at 09:34

## 2024-01-31 RX ADMIN — DIAZEPAM 2 MG: 2 TABLET ORAL at 15:50

## 2024-01-31 RX ADMIN — METHOCARBAMOL 750 MG: 750 TABLET ORAL at 09:34

## 2024-01-31 RX ADMIN — DIAZEPAM 2 MG: 2 TABLET ORAL at 09:33

## 2024-01-31 RX ADMIN — NALOXEGOL OXALATE 12.5 MG: 12.5 TABLET, FILM COATED ORAL at 06:25

## 2024-01-31 RX ADMIN — METOCLOPRAMIDE HYDROCHLORIDE 10 MG: 5 INJECTION INTRAMUSCULAR; INTRAVENOUS at 04:08

## 2024-01-31 RX ADMIN — DOCUSATE SODIUM 50 MG AND SENNOSIDES 8.6 MG 2 TABLET: 8.6; 5 TABLET, FILM COATED ORAL at 09:34

## 2024-01-31 RX ADMIN — ACETAMINOPHEN 650 MG: 325 TABLET ORAL at 15:50

## 2024-01-31 ASSESSMENT — PAIN SCALES - GENERAL
PAINLEVEL_OUTOF10: 3
PAINLEVEL_OUTOF10: 5
PAINLEVEL_OUTOF10: 6
PAINLEVEL_OUTOF10: 3
PAINLEVEL_OUTOF10: 3
PAINLEVEL_OUTOF10: 4
PAINLEVEL_OUTOF10: 5

## 2024-01-31 ASSESSMENT — PAIN DESCRIPTION - ONSET: ONSET: ON-GOING

## 2024-01-31 ASSESSMENT — PAIN DESCRIPTION - DESCRIPTORS
DESCRIPTORS: ACHING
DESCRIPTORS: ACHING
DESCRIPTORS: ACHING;DISCOMFORT

## 2024-01-31 ASSESSMENT — PAIN DESCRIPTION - PAIN TYPE: TYPE: SURGICAL PAIN

## 2024-01-31 ASSESSMENT — PAIN DESCRIPTION - LOCATION
LOCATION: ABDOMEN;BACK
LOCATION: BACK

## 2024-01-31 ASSESSMENT — PAIN DESCRIPTION - ORIENTATION: ORIENTATION: ANTERIOR;MID;POSTERIOR

## 2024-01-31 ASSESSMENT — PAIN - FUNCTIONAL ASSESSMENT: PAIN_FUNCTIONAL_ASSESSMENT: PREVENTS OR INTERFERES SOME ACTIVE ACTIVITIES AND ADLS

## 2024-01-31 NOTE — PLAN OF CARE
Problem: Pain  Goal: Verbalizes/displays adequate comfort level or baseline comfort level  Note: Meeting pain goal with oral mred , iv robaxin , up in chair with brace , incisions washed with soap and water , chg wipe , pt tolerated well

## 2024-01-31 NOTE — PLAN OF CARE
Problem: Pain  Goal: Verbalizes/displays adequate comfort level or baseline comfort level  1/31/2024 0747 by Yokasta Olmedo RN  Outcome: Progressing  Note: Numeric pain scale being utilized. PRN medication being administered for pain per MAR with relief. Plan of care continues.      Problem: Safety - Adult  Goal: Free from fall injury  Outcome: Progressing  Note: Patient resting in bed. Bed alarm on, call light and belongings within reach. Gripper socks on. X1 assist w gait belt and walker to ambulate. Tolerating well. All fall precautions in place. Plan of care ongoing.

## 2024-01-31 NOTE — DISCHARGE SUMMARY
01/30/2024 12:51 AM    PROT 7.5 12/03/2013 07:46 PM    LABALBU 3.8 01/30/2024 12:51 AM    CALCIUM 8.9 01/30/2024 12:51 AM    BILITOT 0.54 12/03/2013 07:46 PM    ALKPHOS 87 12/03/2013 07:46 PM    AST 15 12/03/2013 07:46 PM    ALT 10 12/03/2013 07:46 PM       Discharge Medications:  The patient suffers from a major neurological surgery that pain cannot be managed within an average of 30 MED per day. Severe acute postoperative pain is the reason for exceeding the 30 MED average, and the prescription reflects the same dosage patient received while inpatient, which is the lowest dose consistent with the patient’s medical condition. Non-opioid treatment options have been considered prior to prescribing opioids, and the patient has been advised of the benefits and risks of the opioid (including the potential for addiction).     Medication List        START taking these medications      diazePAM 2 MG tablet  Commonly known as: VALIUM  Take 1 tablet by mouth every 6 hours as needed (Muscle spasms) for up to 10 days. Max Daily Amount: 8 mg     HYDROmorphone 2 MG tablet  Commonly known as: DILAUDID  Take 1-2 tablets by mouth every 4 hours as needed for Pain for up to 7 days. Max Daily Amount: 24 mg     sennosides-docusate sodium 8.6-50 MG tablet  Commonly known as: SENOKOT-S  Take 2 tablets by mouth 2 times daily     tamsulosin 0.4 MG capsule  Commonly known as: FLOMAX  Take 1 capsule by mouth daily for 10 days            CHANGE how you take these medications      methocarbamol 750 MG tablet  Commonly known as: ROBAXIN  Take 1 tablet by mouth 4 times daily for 10 days  What changed:   medication strength  how much to take  when to take this  reasons to take this            CONTINUE taking these medications      amphetamine-dextroamphetamine 15 MG extended release capsule  Commonly known as: ADDERALL XR     hydrOXYzine HCl 25 MG tablet  Commonly known as: ATARAX            STOP taking these medications      ibuprofen 600 MG

## 2024-01-31 NOTE — PLAN OF CARE
Problem: Discharge Planning  Goal: Discharge to home or other facility with appropriate resources  Outcome: Progressing   Plan of care reviewed with pt. All questions answered at this time.     Problem: Pain  Goal: Verbalizes/displays adequate comfort level or baseline comfort level  1/30/2024 2214 by Claire Mccullough RN  Outcome: Progressing  Pt reports surgical back pain controlled with rest, repositioning, and PRN pain medications. Will continue to assess pain on 0-10 scale for appropriate pain management.

## 2024-01-31 NOTE — CARE COORDINATION
Patient plans to discharge home, her father will stay with her.  She is agreeable to RW for home, which will be delivered by [employer].  Patient's family will transport her home.    Gilma Kaufman RN, BSN,    Ortho/Neuro   [phone]

## 2024-01-31 NOTE — PROGRESS NOTES
NEUROSURGERY POST-OP PROGRESS NOTE    Patient Name: Nellie Richardson YOB: 1990   Sex: Female Age: 33 yrs     Medical Record Number: [id number] Acct Number: [id number]   Room Number: 5504/5504-01 Hospital Day: Hospital Day: 2     Interval History:  Post-operative Day# 1 s/p Procedure(s) (LRB):  LUMBAR 4-SACRAL 1 ANTERIOR LUMBAR INTERBODY FUSION WITH PEDICLE SCREW FIXATION (N/A)  . (N/A)    Subjective: Pt has not passed gas, c/o of incisional pain and abdominal pain    Objective:    VITAL SIGNS   /77   Pulse 54   Temp 98.1 °F (36.7 °C) (Oral)   Resp 16   Ht 1.53 m (5' 0.25\")   Wt 64.5 kg (142 lb 3.2 oz)   LMP 01/03/2023 (Exact Date) Comment: hcg neg  SpO2 100%   BMI 27.54 kg/m²    Height Height: 153 cm (5' 0.25\")   Weight Weight - Scale: 64.5 kg (142 lb 3.2 oz)        Allergies Allergies   Allergen Reactions    Oxycodone-Acetaminophen Itching      Itching. Okay tylenol    Oxycodone Hcl       nose itches    Nickel Rash     From certain jewelrey      NPO Status ADULT DIET; Regular   Isolation No active isolations     LABS   Basic Metabolic Profile Recent Labs     01/30/24  0051   *      CO2 22   BUN 11   CREATININE 0.7   GLUCOSE 118*   PHOS 3.6      Complete Blood Count Recent Labs     01/30/24  0713   WBC 11.5*   RBC 4.32      Coagulation Studies No results for input(s): \"PTT\", \"INR\" in the last 72 hours.    Invalid input(s): \"PLATELETS\", \"PROA\", \"PT\", \"PTTA\"     MEDICATIONS   Inpatient Medications     polyethylene glycol, 17 g, Oral, Daily    sennosides-docusate sodium, 2 tablet, Oral, BID    methocarbamol (ROBAXIN) 1,000 mg in dextrose 5 % 100 mL IVPB, 1,000 mg, IntraVENous, Q8H **FOLLOWED BY** [START ON 1/31/2024] methocarbamol, 750 mg, Oral, 4 times per day    metoclopramide, 10 mg, IntraVENous, Q6H    tamsulosin, 
                                                                                                                                                                 NEUROSURGERY POST-OP PROGRESS NOTE    Patient Name: Nellie Richardson YOB: 1990   Sex: Female Age: 33 yrs     Medical Record Number: [id number] Acct Number: [id number]   Room Number: 5504/5504-01 Hospital Day: Hospital Day: 3     Interval History:  Post-operative Day# 2 s/p Procedure(s) (LRB):  LUMBAR 4-SACRAL 1 ANTERIOR LUMBAR INTERBODY FUSION WITH PEDICLE SCREW FIXATION (N/A)  . (N/A)    Subjective: Pt still having lots of pain and muscle spasms.  Objective:    VITAL SIGNS   /69   Pulse 69   Temp 97.5 °F (36.4 °C) (Oral)   Resp 18   Ht 1.53 m (5' 0.25\")   Wt 64.5 kg (142 lb 3.2 oz)   LMP 01/03/2023 (Exact Date) Comment: hcg neg  SpO2 98%   BMI 27.54 kg/m²    Height Height: 153 cm (5' 0.25\")   Weight Weight - Scale: 64.5 kg (142 lb 3.2 oz)        Allergies Allergies   Allergen Reactions    Oxycodone-Acetaminophen Itching      Itching. Okay tylenol    Oxycodone Hcl       nose itches    Nickel Rash     From certain jewelrey      NPO Status ADULT DIET; Regular   Isolation No active isolations     LABS   Basic Metabolic Profile Recent Labs     01/30/24  0051   *      CO2 22   BUN 11   CREATININE 0.7   GLUCOSE 118*   PHOS 3.6        Complete Blood Count Recent Labs     01/30/24  0713   WBC 11.5*   RBC 4.32        Coagulation Studies No results for input(s): \"PTT\", \"INR\" in the last 72 hours.    Invalid input(s): \"PLATELETS\", \"PROA\", \"PT\", \"PTTA\"     MEDICATIONS   Inpatient Medications     polyethylene glycol, 17 g, Oral, Daily    sennosides-docusate sodium, 2 tablet, Oral, BID    [COMPLETED] methocarbamol (ROBAXIN) 1,000 mg in dextrose 5 % 100 mL IVPB, 1,000 mg, IntraVENous, Q8H **FOLLOWED BY** methocarbamol, 750 mg, Oral, 4 times per day    metoclopramide, 10 mg, IntraVENous, Q6H    tamsulosin, 0.4 mg, Oral, Daily    
1/23/2024 1252 PM:      [facility] PRE-SURGICAL TESTING INSTRUCTIONS                      PRIOR TO PROCEDURE DATE:    1. PLEASE FOLLOW ANY INSTRUCTIONS GIVEN TO YOU PER YOUR SURGEON.      2. Arrange for someone to drive you home and be with you for the first 24 hours after discharge for your safety after your procedure for which you received sedation. Ensure it is someone we can share information with regarding your discharge.     NOTE: At this time ONLY 2 ADULTS may accompany you. NO CHILDREN UNDER AGE OF 16.    One person ENCOURAGED to stay at hospital entire time if outpatient surgery      3. You must contact your surgeon for instructions IF:  You are taking any blood thinners, aspirin, anti-inflammatory or vitamins.   Contact your ordering physician/surgeon for medication instructions as soon as possible, especially if taking blood thinners, aspirin, heart, or diabetic medication. STOP SUPPLEMENTS/VITAMINS/NON-STEROIDAL ANTI-INFLAMMATORY MEDICATION 7 DAYS PRIOR TO PROCEDURE.  There is a change in your physical condition such as a cold, fever, rash, cuts, sores, or any other infection, especially near your surgical site.    4. Do not drink alcohol the day before or day of your procedure.  Do not use any recreational marijuana at least 24 hours or street drugs (heroin, cocaine) at minimum 5 days prior to your procedure.     5. A Pre-Surgical History and Physical MUST be completed WITHIN 30 DAYS OR LESS prior to your procedure.by your Physician or an Urgent Care        THE DAY OF YOUR PROCEDURE:  1.  Follow instructions for ARRIVAL TIME as DIRECTED BY YOUR SURGEON.     2. Enter the MAIN entrance from [facility] and follow the signs to the free Parking Garage or  Parking (offered free of charge 7 am-5pm).      3. Enter the Main Entrance of the hospital (do not enter from the lower level of the parking garage). Upon entrance, check in with the  at the surgical information desk on your LEFT. 
1/23/2024 1254 PM:   PRESURGICAL BATHING INSTRUCTIONS  The [facility] takes many steps to prevent infections during surgery. One way is to provide   you with 4% Chlorhexidine Gluconate (CHG), a special antiseptic soap to wash your skin prior to   surgery. By thoroughly washing your skin, you can reduce the number of germs and help us   prevent an infection. Skin prep is a very important part of getting you ready for surgery. Please   follow the instructions listed below. Do NOT use if you have had an allergic reaction to CHG   previously.   Common Brand names:  Betasept, Hibiclens, Hibistat, Exidine, BioScrub, Dianna-Hex, Peridex, Clorostat      Showering Steps  Before Your Procedure:  Wash your hair, face and body using your regular soap and shampoo.    Rinse your hair and body thoroughly to remove any soap or shampoo residue.  Turn the water off to prevent rinsing the antiseptic soap (CHG) off too soon.    Wash the body gently for 5 minutes using a clean washcloth wet down with the CHG soap on it.    Apply the Chlorhexidine Gluconate (CHG) soap to your entire body only from the neck down.   Do not use on your face, eyes, ears, hair or genital area to avoid permanent injury to those areas.  Do not scrub the skin too hard. Wash thoroughly paying special attention to the area   where the surgery or procedure will be done.   Do not wash with regular soap once CHG is used.   Turn the water back on and rinse the body off thoroughly.  Pat yourself dry with a clean fresh towel after each shower.  Put on clean clothes after each shower.  Do not put on lotions or powders after bathing.    If any kind of rash appears, stop use and contact your surgeon    A bottle of Chlorhexidine Gluconate CHG) can be purchased at most local pharmacy chain stores.   The soap may come in a liquid form, wipes or scrub brush applicator.  Any form is fine.  Remember   to use prior to your surgery.     If you have any questions, please call 
1/23/24 1250 PM :  TI COMPLETED/TS    H&P DONE AT [facility] ON 1/11/2024 PHONE [phone], CALLED FOR H&P AND LABS/TS    LMOR FOR PHILLY AT DR ONOFRE'S OFFICE TO INFORM OF PT'S ALLERGY TO NICKEL AND PT REQUESTING CALL FROM JEMIMA'S OFFICE TO DISCUSS INSURANCE DEIAL OF PROCEDURE/TS   
1818 Admitted to PACU from OR. Connected to monitor. Report at bedside. No sign of pain until turned to check back incisions. 0.5 Dilaudid given IVP for pain 4/10.  
4 Eyes Skin Assessment     NAME:  Nellie Richardson  YOB: 1990  MEDICAL RECORD NUMBER:  [id number]    The patient is being assessed for  Post-Op Surgical    I agree that at least one RN has performed a thorough Head to Toe Skin Assessment on the patient. ALL assessment sites listed below have been assessed.      Areas assessed by both nurses:    Head, Face, Ears, Shoulders, Back, Chest, Arms, Elbows, Hands, Sacrum. Buttock, Coccyx, Ischium, Legs. Feet and Heels, and Under Medical Devices         Does the Patient have a Wound? No noted wound(s)       Jose Juan Prevention initiated by RN: No  Wound Care Orders initiated by RN: No    Pressure Injury (Stage 3,4, Unstageable, DTI, NWPT, and Complex wounds) if present, place Wound referral order by RN under : No    New Ostomies, if present place, Ostomy referral order under : No     Nurse 1 eSignature: Electronically signed by Surekha Jimenes RN on 1/30/24 at 2:38 AM EST    **SHARE this note so that the co-signing nurse can place an eSignature**    Nurse 2 eSignature: Electronically signed by Joel Quiroz RN on 1/31/24 at 12:42 AM EST  
All discharge instructions reviewed with patient. IV removed without complication. All questions and concerns addressed at this time. Patient belongings gathered.   
Arrived for back surgery see consent., hand off from Lilli Nath RN             H/P  per health [facility]. 1/23/24 needs up-date  
Benadryl given for itchy nose.  In patient waiting for a bed.  
Meng left with patient. Wallet and phone case with credit cards sent home with carolynn Mane.  
Occupational Therapy  Facility/Department: [facility] ORTHO/NEURO  Occupational Therapy Initial Assessment/Tx/Discharge Note    Name: Nellie Richardson  : 1990  MRN: [id number]  Date of Service: 2024    Assessment: Pt is doing well POD #1. She is ambulating spvn to SBA, using walker for comfort. She is able to complete ADLs with little to no assist. Pt will have initial 24 hr A of her dad at d/c. She has good safety awareness and does not have additional OT needs. PT to f/u.    Discharge Recommendations:  24 hour supervision or assist (initially)    OT Equipment Recommendations  Equipment Needed: No (can borrow shower chair and walker from her mother)       Assessment   Decision Making: Medium Complexity  No Skilled OT: No OT goals identified  REQUIRES OT FOLLOW-UP: No  Activity Tolerance  Activity Tolerance: Patient Tolerated treatment well        Plan  D/C OT        Restrictions  Position Activity Restriction  Other position/activity restrictions: ambulate, LSO    Subjective   General  Chart Reviewed: Yes  Additional Pertinent Hx: 33 y.o. F admitted  s/p L4-S1 ALIF.  Family / Caregiver Present: No  Referring Practitioner: Kimberly  Diagnosis: lumbar stenosis  Subjective  Subjective: Pt in bed on entry. Pleasant and cooperative with therapy.  General Comment  Comments: Pt c/o 5/10 incisional pain, RN aware and able to medicate, ice packs applied seated in chair at end of session     Social/Functional History  Social/Functional History  Lives With:  (16, 11, 5 yo children)  Type of Home: House  Home Layout: One level  Home Access: Stairs to enter without rails  Entrance Stairs - Number of Steps: 3  Bathroom Shower/Tub: Tub/Shower unit  Bathroom Toilet: Standard  Bathroom Equipment: Shower chair, Grab bars in shower  Home Equipment:  (No DME, mom has DME pt can borrow)  ADL Assistance: Independent  Homemaking Assistance: Independent  Ambulation Assistance: Independent  Active : Yes  Occupation: Full 
PACU Transfer Note    Vitals:    01/29/24 2045   BP: 103/82   Pulse: 53   Resp: 13   Temp: 97.5 °F (36.4 °C)   SpO2: 100%       In: 300 [I.V.:300]  Out: 0     Pain assessment:    Pain Level: 2    Report given to Receiving unit PATRICE Cartagena. Updated that patient voided freely on a bedside commode.    Transported by Kushal, PACU transporter.    1/29/2024 9:20 PM      
Patient is alert and oriented x4. VSS on room air. Medications given per MAR, no side effects noted. Patient ambulating x1 with gait belt and walker. PRN valium and dilaudid given for pain, continuing to monitor and manage per MAR. Voiding well via BRP. Patient able to take a shower this shift. Worked with therapy and ambulated in the hallway.      Patient is currently resting in bed with bed alarm on for safety. Call light within reach and all fall precautions in place. Plan of care continues.  
Physical Therapy  Facility/Department: [facility] ORTHO/NEURO  Physical Therapy Initial Assessment and Treatment     Name: Nellie Richardson  : 1990  MRN: [id number]  Date of Service: 2024    Discharge Recommendations:  24 hour supervision or assist   PT Equipment Recommendations  Equipment Needed: No      Patient Diagnosis(es): The encounter diagnosis was S/P lumbar and lumbosacral fusion by anterior technique.  Past Medical History:  has a past medical history of Asthma, Back pain, Bacterial vaginosis, Chlamydia, Depression, Factor 5 Leiden mutation, heterozygous (HCC), and Sciatic pain.  Past Surgical History:  has a past surgical history that includes [place] tooth extraction (10/2010); pr njx dx/ther sbst intrlmnr crv/thrc w/img gdn (Left, 2018); epidural steroid injection (Bilateral, 2019); Lumbar spine surgery (N/A, 2019); Pain management procedure (Left, 10/27/2020); Hemorrhoid surgery (N/A, 10/28/2020); Hemorrhoid surgery (N/A, 10/28/2020); Lumbar spine surgery (N/A, 2021); Pain management procedure (Left, 2022); Pain management procedure (Bilateral, 2022); Nerve Block (Bilateral, 09/15/2022); Pain management procedure (Bilateral, 10/27/2022); Nerve Block (Left, 2023); Hysterectomy; Nose surgery (Bilateral, 2023); hernia repair; and lumbar fusion (N/A, 2024).    Assessment   Assessment: Pt is 32 yo F admitted on 24 with lumbar stenosis and underwent spinal fusion.  Typically, pt lives at home with her children.  Pt works, drives and is independent.  Presently, pt is moving with CGA.  Pt is ambulating both with and without wheeled walker. but appears less antalgic with walker.  Pt has access to her mother's wheeled walker.  Rec 24 hour assist, which her father will provide.  Will follow.  Treatment Diagnosis: Decreased functional mobility  Therapy Prognosis: Good  Decision Making: Medium Complexity  Requires PT Follow-Up: Yes  Activity 
Physical Therapy  Facility/Department: [facility] ORTHO/NEURO  Physical Therapy Treatment    Name: Nellie Richardson  : 1990  MRN: [id number]  Date of Service: 2024    Discharge Recommendations:  24 hour supervision or assist, Home with Home health PT (transition to outpatient PT as cleared by surgeon)   PT Equipment Recommendations  Equipment Needed: Yes  Mobility Devices: Walker  Walker: Rolling      Patient Diagnosis(es): The encounter diagnosis was S/P lumbar and lumbosacral fusion by anterior technique.  Past Medical History:  has a past medical history of Asthma, Back pain, Bacterial vaginosis, Chlamydia, Depression, Factor 5 Leiden mutation, heterozygous (HCC), and Sciatic pain.  Past Surgical History:  has a past surgical history that includes [place] tooth extraction (10/2010); pr njx dx/ther sbst intrlmnr crv/thrc w/img gdn (Left, 2018); epidural steroid injection (Bilateral, 2019); Lumbar spine surgery (N/A, 2019); Pain management procedure (Left, 10/27/2020); Hemorrhoid surgery (N/A, 10/28/2020); Hemorrhoid surgery (N/A, 10/28/2020); Lumbar spine surgery (N/A, 2021); Pain management procedure (Left, 2022); Pain management procedure (Bilateral, 2022); Nerve Block (Bilateral, 09/15/2022); Pain management procedure (Bilateral, 10/27/2022); Nerve Block (Left, 2023); Hysterectomy; Nose surgery (Bilateral, 2023); hernia repair; and lumbar fusion (N/A, 2024).    Assessment   Body Structures, Functions, Activity Limitations Requiring Skilled Therapeutic Intervention: Decreased functional mobility ;Decreased ADL status;Decreased strength;Decreased endurance;Decreased balance;Increased pain  Assessment: Paient demonstrates impaired functional mobility, now requiring (S) to SBA with RW for standing mobility, limited by abdominal pain > back pain.  Patient lives at home with her children, her father is planning to stay to (A) as needed.  Patient is (I) at 
Physical Therapy  Nellie Richardson    Chart reviewed.  PT attempted to see patient for follow up treatment although patient preparing to shower with nursing staff.  Plan to re-attempt later this date - patient in agreement.  Plan to continue to follow per plan of care.    Belle Vazquez PT, DPT [id number]      
Pt. Oriented x4. VSS on RA. Pt. Managing pain per MAR, this RN offered PRN PO dilaudid @ 4460, Pt. Declined stating pain was only a 1. Pt. Ambulated multiple times throughout night in room. Pt. Voiding well via BRP. Dressing to stomach CDI. All fall precautions in place and call light is within reach.   
Pt/family aware surgery delayed till 6109  
Report from Carlee, PACU RN, awaiting room to be done cleaning, report already given to floor RN, Mitzi.  Patient in bed comfortable, pain 2/10, on her cellphone.  
Report given to Surekha IBRAHIM from [facility]  
Saline bolus started as ordered for low BP. Will monitor.  
Secure message sent to MD to report low BP, awaiting response/orders.  
     Height:             Physical Exam:      General: NAD  Eyes: EOMI  ENT: neck supple  Cardiovascular: Regular rate.  Respiratory: Clear to auscultation  Gastrointestinal: Soft, non tender  Genitourinary: no suprapubic tenderness  Musculoskeletal: No edema  Skin: warm, dry  Neuro: Alert.  Psych: Mood appropriate.       Medications:   Medications:    polyethylene glycol  17 g Oral Daily    sennosides-docusate sodium  2 tablet Oral BID    methocarbamol  750 mg Oral 4 times per day    metoclopramide  10 mg IntraVENous Q6H    tamsulosin  0.4 mg Oral Daily    naloxegol  12.5 mg Oral QAM AC    amphetamine-dextroamphetamine  15 mg Oral Daily    sodium chloride flush  5-40 mL IntraVENous 2 times per day    enoxaparin  40 mg SubCUTAneous Daily    acetaminophen  650 mg Oral Q6H      Infusions:    sodium chloride       PRN Meds: diazePAM, 2 mg, Q6H PRN  HYDROmorphone, 2 mg, Q4H PRN   Or  HYDROmorphone, 4 mg, Q4H PRN  sodium chloride flush, 5-40 mL, PRN  sodium chloride, , PRN  HYDROmorphone, 0.25 mg, Q3H PRN   Or  HYDROmorphone, 0.5 mg, Q3H PRN  ondansetron, 4 mg, Q8H PRN   Or  ondansetron, 4 mg, Q6H PRN        Labs and Imaging   XR LUMBAR SPINE (2-3 VIEWS)    Result Date: 1/29/2024  EXAM: XR LUMBAR SPINE (2-3 VIEWS), FLUORO FOR SURGICAL PROCEDURES INDICATION: LUMBAR 4-SACRAL 1 ANTERIOR LUMBAR INTERBODY FUSION WITH PEDICLE SCREW FIXATION, COMPARISON: None FINDINGS: Peak Skin dose: 30.31 mGy Fluoroscopy was provided. Intraoperative CT was also performed for orthopedic hardware confirmation. Orthopedic hardware is intact without evidence of loosening. No definitive abnormalities identified on limited CT examination.  No radiologist was in attendance.  Please see operative report for further details.     see above Electronically signed by Abdias Manley MD    FLUORO FOR SURGICAL PROCEDURES    Result Date: 1/29/2024  EXAM: XR LUMBAR SPINE (2-3 VIEWS), FLUORO FOR SURGICAL PROCEDURES INDICATION: LUMBAR 4-SACRAL 1 ANTERIOR LUMBAR

## 2024-02-13 ENCOUNTER — TRANSCRIBE ORDERS (OUTPATIENT)
Dept: ADMINISTRATIVE | Age: 34
End: 2024-02-13

## 2024-02-13 DIAGNOSIS — R22.43 LOCALIZED SWELLING OF BOTH LOWER LEGS: Primary | ICD-10-CM

## 2024-02-13 DIAGNOSIS — Z48.89 ENCOUNTER FOR OTHER SPECIFIED SURGICAL AFTERCARE: ICD-10-CM

## 2024-02-14 ENCOUNTER — HOSPITAL ENCOUNTER (OUTPATIENT)
Dept: VASCULAR LAB | Age: 34
Discharge: HOME | End: 2024-02-14
Attending: NEUROLOGICAL SURGERY
Payer: COMMERCIAL

## 2024-02-14 DIAGNOSIS — Z48.89 ENCOUNTER FOR OTHER SPECIFIED SURGICAL AFTERCARE: ICD-10-CM

## 2024-02-14 DIAGNOSIS — R22.43 LOCALIZED SWELLING OF BOTH LOWER LEGS: ICD-10-CM

## 2024-02-14 PROCEDURE — 93970 EXTREMITY STUDY: CPT

## 2024-03-13 ENCOUNTER — HOSPITAL ENCOUNTER (OUTPATIENT)
Dept: GENERAL RADIOLOGY | Age: 34
Discharge: HOME | End: 2024-03-13
Payer: COMMERCIAL

## 2024-03-13 ENCOUNTER — HOSPITAL ENCOUNTER (OUTPATIENT)
Age: 34
Discharge: HOME | End: 2024-03-13
Payer: COMMERCIAL

## 2024-03-13 DIAGNOSIS — R22.43 LOCALIZED SWELLING OF BOTH LOWER LEGS: ICD-10-CM

## 2024-03-13 DIAGNOSIS — R22.43 LOCALIZED SWELLING, MASS, OR LUMP OF LOWER EXTREMITY, BILATERAL: ICD-10-CM

## 2024-03-13 PROCEDURE — 72110 X-RAY EXAM L-2 SPINE 4/>VWS: CPT

## 2024-05-09 NOTE — PROGRESS NOTES
[facility]   Electrophysiology Consultation     Date: 5/14/2024  Reason for Consultation: Bradycardia   Consult Requesting Physician: Tati Tracy APRN - CNP     CC: low heart rate     HPI: Nellie Richardson is a 34 y.o. female history of sinus bradycardia, lumbar fusions in January 2024. Was referred for low resting heart rate.    Patient presents today as a new patient for evaluation management of bradycardia.  She has had chronically low resting heart rates, has had difficulty with weight loss following her third child, has occasional palpitations at night while resting in bed.  She uses a Fitbit to monitor heart rates which typically in the 60s but has seen her rates down to be in the 40s to 50s at rest.  She is asymptomatic, has no exertional fatigue, shortness of breath, normal exercise tolerance.  Her TSH was previously checked and normal within the last year.    Review of System:  [x] Full ROS obtained and negative except as mentioned in HPI or below      Prior to Admission medications    Medication Sig Start Date End Date Taking? Authorizing Provider   traMADol (ULTRAM) 50 MG tablet TRAMADOL HCL 50 MG TABS 3/19/24  Yes Provider, Historical, MD   hydrOXYzine HCl (ATARAX) 25 MG tablet Take 1 tablet by mouth every 8 hours as needed 6/21/23  Yes Provider, Historical, MD   amphetamine-dextroamphetamine (ADDERALL XR) 15 MG extended release capsule TAKE 1 CAPSULE BY MOUTH EVERY DAY IN THE MORNING UPON AWAKENING 7/3/23  Yes Provider, MD Estelita   sennosides-docusate sodium (SENOKOT-S) 8.6-50 MG tablet Take 2 tablets by mouth 2 times daily  Patient not taking: Reported on 5/14/2024 1/31/24   Cody Castañead APRN - CNP   tamsulosin (FLOMAX) 0.4 MG capsule Take 1 capsule by mouth daily for 10 days  Patient not taking: Reported on 5/14/2024 1/31/24 2/10/24  Cody Castañeda APRN - CNP   doxycycline hyclate (VIBRA-TABS) 100 MG tablet Take 1 tablet by mouth daily  Patient not taking:

## 2024-05-14 ENCOUNTER — OFFICE VISIT (OUTPATIENT)
Dept: CARDIOLOGY CLINIC | Age: 34
End: 2024-05-14
Payer: COMMERCIAL

## 2024-05-14 VITALS
DIASTOLIC BLOOD PRESSURE: 60 MMHG | HEART RATE: 48 BPM | HEIGHT: 60 IN | SYSTOLIC BLOOD PRESSURE: 104 MMHG | WEIGHT: 139.4 LBS | OXYGEN SATURATION: 98 % | BODY MASS INDEX: 27.37 KG/M2

## 2024-05-14 DIAGNOSIS — I49.9 IRREGULAR HEART RATE: Primary | ICD-10-CM

## 2024-05-14 DIAGNOSIS — R00.1 BRADYCARDIA: ICD-10-CM

## 2024-05-14 PROCEDURE — G8417 CALC BMI ABV UP PARAM F/U: HCPCS | Performed by: INTERNAL MEDICINE

## 2024-05-14 PROCEDURE — 99203 OFFICE O/P NEW LOW 30 MIN: CPT | Performed by: INTERNAL MEDICINE

## 2024-05-14 PROCEDURE — 1036F TOBACCO NON-USER: CPT | Performed by: INTERNAL MEDICINE

## 2024-05-14 PROCEDURE — 93000 ELECTROCARDIOGRAM COMPLETE: CPT | Performed by: INTERNAL MEDICINE

## 2024-05-14 PROCEDURE — G8427 DOCREV CUR MEDS BY ELIG CLIN: HCPCS | Performed by: INTERNAL MEDICINE

## 2024-05-14 RX ORDER — TRAMADOL HYDROCHLORIDE 50 MG/1
TABLET ORAL
COMMUNITY
Start: 2024-03-19

## 2024-05-14 NOTE — PATIENT INSTRUCTIONS
Continue to monitor for any symptoms and monitor heart rate with FitBit  Continue current medications  Recommend continuing to be active  Follow up with cardiology as needed

## 2024-08-09 ENCOUNTER — HOSPITAL ENCOUNTER (OUTPATIENT)
Dept: ULTRASOUND IMAGING | Age: 34
Discharge: HOME | End: 2024-08-09
Payer: COMMERCIAL

## 2024-08-09 DIAGNOSIS — E04.9 ENLARGEMENT OF THYROID: ICD-10-CM

## 2024-08-09 PROCEDURE — 76536 US EXAM OF HEAD AND NECK: CPT

## 2025-07-25 ENCOUNTER — TRANSCRIBE ORDERS (OUTPATIENT)
Dept: ADMINISTRATIVE | Age: 35
End: 2025-07-25

## 2025-07-25 DIAGNOSIS — E04.2 NONTOXIC MULTINODULAR GOITER: Primary | ICD-10-CM

## (undated) DEVICE — GLOVE ORANGE PI 7   MSG9070

## (undated) DEVICE — BLADE ES ELASTOMERIC COAT INSUL DURABLE BEND UPTO 90DEG

## (undated) DEVICE — TOWEL,OR,DSP,ST,BLUE,STD,4/PK,20PK/CS: Brand: MEDLINE

## (undated) DEVICE — SUTURE VCRL SZ 0 L27IN ABSRB UD L26MM CT-2 1/2 CIR J270H

## (undated) DEVICE — COVER,MAYO STAND,XL,STERILE: Brand: MEDLINE

## (undated) DEVICE — SOLUTION SCRB 3% CHLOROXYLENOL BLU ANTIMIC SKIN CLN

## (undated) DEVICE — BRIEF INCONT M L 20-60IN BRN PREM KNIT SEAMLESS LTWT PROTCT

## (undated) DEVICE — SPONGE ABSORBABLE HEMORRHOIDECTOMY 8X3 CM GEL SURGFOAM

## (undated) DEVICE — SUTURE VCRL + SZ 3-0 L36IN ABSRB UD L36MM CT-1 1/2 CIR VCP944H

## (undated) DEVICE — YANKAUER,BULB TIP,W/O VENT,RIGID,STERILE: Brand: MEDLINE

## (undated) DEVICE — SUTURE VCRL + SZ 3-0 L18IN ABSRB UD SH 1/2 CIR TAPERCUT NDL VCP864D

## (undated) DEVICE — ADHESIVE SKIN CLOSURE WND 8.661X1.5 IN 22 CM LIQUIBAND SECUR

## (undated) DEVICE — STERILE POLYISOPRENE POWDER-FREE SURGICAL GLOVES: Brand: PROTEXIS

## (undated) DEVICE — DRAPE MICSCP W54XL150IN W/ 4 BINOC GLS LENS LEICA

## (undated) DEVICE — GLOVE SURG SZ 8 L12IN FNGR THK94MIL TRNSLUC YEL LTX HYDRGEL

## (undated) DEVICE — PACK,UNIVERSAL,SPLIT,II,AURORA: Brand: MEDLINE

## (undated) DEVICE — SUTURE ETHLN SZ 3-0 L30IN NONABSORBABLE BLK L36MM FSLX 3/8 1673BH

## (undated) DEVICE — STANDARD HYPODERMIC NEEDLE,POLYPROPYLENE HUB: Brand: MONOJECT

## (undated) DEVICE — ALCOHOL RUBBING 16OZ 70% ISO

## (undated) DEVICE — AVANOS* UNIVERSAL BLOCK TRAYS: Brand: AVANOS

## (undated) DEVICE — PAD,ABDOMINAL,8"X10",ST,LF: Brand: MEDLINE

## (undated) DEVICE — SUTURE MCRYL + SZ 4-0 L27IN ABSRB UD L19MM PS-2 3/8 CIR MCP426H

## (undated) DEVICE — CHLORAPREP 26ML ORANGE

## (undated) DEVICE — Z DISCONTINUED (SUB = MFG CAT 4422) SPONGE GZ 4X4 IN 8 PLY NS

## (undated) DEVICE — GOWN SIRUS NONREIN XL W/TWL: Brand: MEDLINE INDUSTRIES, INC.

## (undated) DEVICE — GLOVE SURG SZ 75 L12IN FNGR THK79MIL GRN LTX FREE

## (undated) DEVICE — LAMINECTOMY: Brand: MEDLINE INDUSTRIES, INC.

## (undated) DEVICE — SUTURE VCRL + SZ 0 L18IN ABSRB UD L36MM CT-1 1/2 CIR VCP840D

## (undated) DEVICE — GAUZE,SPONGE,4"X4",16PLY,XRAY,STRL,LF: Brand: MEDLINE

## (undated) DEVICE — SHEET, T, LAPAROTOMY, STERILE: Brand: MEDLINE

## (undated) DEVICE — GOWN,AURORA,NONREINF,RAGLAN,XXL,STERILE: Brand: MEDLINE

## (undated) DEVICE — SOLUTION IV IRRIG 500ML 0.9% SODIUM CHL 2F7123

## (undated) DEVICE — TOWEL,STOP FLAG GOLD N-W: Brand: MEDLINE

## (undated) DEVICE — GLOVE SURG SZ 8 CRM LTX FREE POLYISOPRENE POLYMER BEAD ANTI

## (undated) DEVICE — 3M™ IOBAN™ 2 ANTIMICROBIAL INCISE DRAPE 6648EZ: Brand: IOBAN™ 2

## (undated) DEVICE — SPONGE,NEURO,0.5"X3",XR,STRL,LF,10/PK: Brand: MEDLINE

## (undated) DEVICE — ARM CRADLE: Brand: DEVON

## (undated) DEVICE — TUBING, SUCTION, 3/16" X 10', STRAIGHT: Brand: MEDLINE

## (undated) DEVICE — SUTURE MCRYL + SZ 4-0 L18IN ABSRB UD L19MM PS-2 3/8 CIR MCP496G

## (undated) DEVICE — SYRINGE MED 10ML LUERLOCK TIP W/O SFTY DISP

## (undated) DEVICE — LIQUIBAND RAPID ADHESIVE 36/CS 0.8ML: Brand: MEDLINE

## (undated) DEVICE — DRAPE 33X23IN INCISE ANTIMICROB IOBAN 2

## (undated) DEVICE — SKIN MARKER,REGULAR TIP WITH RULER AND LABELS: Brand: DEVON

## (undated) DEVICE — UNDERGLOVE SURG SZ 8 BLU LTX FREE SYN POLYISOPRENE POLYMER

## (undated) DEVICE — SUTURE PERMAHAND SZ 2-0 L30IN NONABSORBABLE BLK SILK W/O A305H

## (undated) DEVICE — KIT JACK TBL PT CARE

## (undated) DEVICE — APPLIER CLP L9.375IN APER 2.1MM CLS L3.8MM 20 SM TI CLP

## (undated) DEVICE — Z DISCONTINUED USE 2432103 SOLUTION PREP PVP-I W/ APPL URETHANE COT TIP SPNG

## (undated) DEVICE — ELECTRODE PT RET AD L9FT HI MOIST COND ADH HYDRGEL CORDED

## (undated) DEVICE — UNIVERSAL BLOCK TRAY: Brand: MEDLINE INDUSTRIES, INC.

## (undated) DEVICE — SOLUTION IV IRRIG POUR BRL 0.9% SODIUM CHL 2F7124

## (undated) DEVICE — GLOVE,SURG,SENSICARE,ALOE,LF,PF,6: Brand: MEDLINE

## (undated) DEVICE — BLADE 1882040HR 5PK M4 INF TURB 2MM ROT: Brand: STRAIGHTSHOT

## (undated) DEVICE — SYRINGE MED 30ML STD CLR PLAS LUERLOCK TIP N CTRL DISP

## (undated) DEVICE — GLOVE SURG SZ 6 L12IN FNGR THK75MIL WHT LTX POLYMER BEAD

## (undated) DEVICE — GAUZE,SPONGE,4"X4",16PLY,STRL,LF,10/TRAY: Brand: MEDLINE

## (undated) DEVICE — SPONGE,LAP,18"X18",DLX,XR,ST,5/PK,40/PK: Brand: MEDLINE

## (undated) DEVICE — TRAY PREP DRY W/ PREM GLV 2 APPL 6 SPNG 2 UNDPD 1 OVERWRAP

## (undated) DEVICE — COAGULATOR SUCT 10FR L6IN HND FT SWCH VALLEYLAB

## (undated) DEVICE — MAJOR SET UP PK

## (undated) DEVICE — SPONGE,PEANUT,XRAY,ST,SM,3/8",5/CARD: Brand: MEDLINE INDUSTRIES, INC.

## (undated) DEVICE — GLOVE ORANGE PI 8 1/2   MSG9085

## (undated) DEVICE — SUTURE VCRL + SZ 2-0 L18IN ABSRB UD CT1 L36MM 1/2 CIR VCP839D

## (undated) DEVICE — SUTURE VCRL + SZ 3-0 L27IN ABSRB WHT CT-1 1/2 CIR VCP258H

## (undated) DEVICE — CODMAN® SURGICAL PATTIES 1/2" X 3" (1.27CM X 7.62CM): Brand: CODMAN®

## (undated) DEVICE — DRAPE,REIN 53X77,STERILE: Brand: MEDLINE

## (undated) DEVICE — GLOVE SURG SZ 75 L12IN FNGR THK94MIL TRNSLUC YEL LTX

## (undated) DEVICE — SET ADMIN PRIMING 7ML L30IN 7.35LB 20 GTT 2ND RLER CLMP

## (undated) DEVICE — CATHETER IV 20GA L1.25IN PNK FEP SFTY STR HUB RADPQ DISP

## (undated) DEVICE — SLEEVE PROTCT THRD LCK FOR SYNFIX EVOLUTION SYS

## (undated) DEVICE — SUTURE CHROMIC GUT SZ 3-0 L27IN ABSRB BRN L26MM SH 1/2 CIR G122H

## (undated) DEVICE — Z DISC NO SUB GLOVE SURG SZ 75 L12IN FNGR THK87MIL DK GRN LTX FREE ISOLEX

## (undated) DEVICE — Z DISCONTINUED NO SUB IDED SET GRAV VENT NVENT CK VLV 3 NDL FREE PRT 10 GTT

## (undated) DEVICE — 3M™ TEGADERM™ TRANSPARENT FILM DRESSING FRAME STYLE, 1626W, 4 IN X 4-3/4 IN (10 CM X 12 CM), 50/CT 4CT/CASE: Brand: 3M™ TEGADERM™

## (undated) DEVICE — APPLIER LIG CLP M L11IN TI STR RNG HNDL FOR 20 CLP DISP

## (undated) DEVICE — ORTHO PRE OP PACK: Brand: MEDLINE INDUSTRIES, INC.

## (undated) DEVICE — TUBING, SUCTION, 1/4" X 12', STRAIGHT: Brand: MEDLINE

## (undated) DEVICE — SLEEVE PROTCT FOR SCRDRVR AND AWL SYNFIX EVOLUTION SYS

## (undated) DEVICE — SPK10110 JACKSON TABLE KIT: Brand: SPK10110 JACKSON TABLE KIT

## (undated) DEVICE — SOLUTION IV 1000ML LAC RINGERS PH 6.5 INJ USP VIAFLX PLAS

## (undated) DEVICE — HEAD AND NECK PACK: Brand: CONVERTORS

## (undated) DEVICE — AGENT HEMSTAT W4XL4IN OXIDIZED REGENERATED CELOS ABSRB SFT

## (undated) DEVICE — PACK,UNIVERSAL,NO GOWNS: Brand: MEDLINE

## (undated) DEVICE — SUTURE CHROMIC GUT SZ 4-0 L27IN ABSRB BRN L17MM RB-1 1/2 U203H

## (undated) DEVICE — SUTURE VIC COAT BR VIO CP2 4-0 18IN J392H

## (undated) DEVICE — Device

## (undated) DEVICE — COVER LT HNDL CAM BLU DISP W/ SURG CTRL

## (undated) DEVICE — DRAPE,UNDERBUTTOCKS,PCH,STERILE: Brand: MEDLINE

## (undated) DEVICE — KIT,ANTI FOG,W/SPONGE & FLUID,SOFT PACK: Brand: MEDLINE

## (undated) DEVICE — SUTURE ABSORBABLE MONOFILAMENT 0 CTX 60 IN VIO PDS + PDP990G

## (undated) DEVICE — TOOL MR8-14MH30 MR8 14CM MATCH 3MM: Brand: MIDAS REX MR8

## (undated) DEVICE — CRADLE POS PRONE 24 X 5 X 3 IN ARM N COMPR NO CVR FOAM DISP

## (undated) DEVICE — CIRCUIT ANES L72IN 3L BACT AND VIR FLTR EL CONN SGL LIMB

## (undated) DEVICE — AGENT HEMOSTATIC SURGIFLOW MATRIX KIT W/THROMBIN

## (undated) DEVICE — SURE SET-DOUBLE BASIN-LF: Brand: MEDLINE INDUSTRIES, INC.

## (undated) DEVICE — SUTURE VCRL SZ 2-0 L18IN ABSRB UD CT-1 L36MM 1/2 CIR J839D

## (undated) DEVICE — SYRINGE IRRIG 60ML SFT PLIABLE BLB EZ TO GRP 1 HND USE W/

## (undated) DEVICE — TOOL 14MH30 LEGEND 14CM 3MM: Brand: MIDAS REX ™

## (undated) DEVICE — INVIEW CLEAR LEGGINGS: Brand: CONVERTORS

## (undated) DEVICE — SOLUTION IRRIG 500ML 0.9% SOD CHLO USP POUR PLAS BTL

## (undated) DEVICE — SOLUTION IV 1000ML 0.9% SOD CHL